# Patient Record
Sex: FEMALE | Race: WHITE | NOT HISPANIC OR LATINO | Employment: OTHER | URBAN - METROPOLITAN AREA
[De-identification: names, ages, dates, MRNs, and addresses within clinical notes are randomized per-mention and may not be internally consistent; named-entity substitution may affect disease eponyms.]

---

## 2018-09-12 ENCOUNTER — TRANSCRIBE ORDERS (OUTPATIENT)
Dept: ADMINISTRATIVE | Facility: HOSPITAL | Age: 83
End: 2018-09-12

## 2018-09-12 ENCOUNTER — HOSPITAL ENCOUNTER (OUTPATIENT)
Dept: RADIOLOGY | Facility: HOSPITAL | Age: 83
Discharge: HOME | End: 2018-09-12
Attending: FAMILY MEDICINE
Payer: MEDICARE

## 2018-09-12 DIAGNOSIS — M54.5 LOW BACK PAIN, UNSPECIFIED BACK PAIN LATERALITY, UNSPECIFIED CHRONICITY, WITH SCIATICA PRESENCE UNSPECIFIED: ICD-10-CM

## 2018-09-12 DIAGNOSIS — M25.551 RIGHT HIP PAIN: ICD-10-CM

## 2018-09-12 DIAGNOSIS — M25.551 RIGHT HIP PAIN: Primary | ICD-10-CM

## 2018-09-12 PROCEDURE — 73521 X-RAY EXAM HIPS BI 2 VIEWS: CPT

## 2018-09-12 PROCEDURE — 72100 X-RAY EXAM L-S SPINE 2/3 VWS: CPT

## 2018-11-30 ENCOUNTER — TRANSCRIBE ORDERS (OUTPATIENT)
Dept: LAB | Facility: HOSPITAL | Age: 83
End: 2018-11-30

## 2018-11-30 ENCOUNTER — HOSPITAL ENCOUNTER (OUTPATIENT)
Dept: RADIOLOGY | Facility: HOSPITAL | Age: 83
Discharge: HOME | End: 2018-11-30
Attending: FAMILY MEDICINE
Payer: MEDICARE

## 2018-11-30 DIAGNOSIS — M54.2 CERVICALGIA: ICD-10-CM

## 2018-11-30 DIAGNOSIS — M54.2 CERVICALGIA: Primary | ICD-10-CM

## 2018-11-30 PROCEDURE — 72040 X-RAY EXAM NECK SPINE 2-3 VW: CPT

## 2021-03-25 ENCOUNTER — HOSPITAL ENCOUNTER (INPATIENT)
Facility: HOSPITAL | Age: 86
LOS: 1 days | Discharge: HOME HEALTH | DRG: 563 | End: 2021-03-27
Attending: EMERGENCY MEDICINE | Admitting: HOSPITALIST
Payer: COMMERCIAL

## 2021-03-25 ENCOUNTER — APPOINTMENT (EMERGENCY)
Dept: RADIOLOGY | Facility: HOSPITAL | Age: 86
DRG: 563 | End: 2021-03-25
Payer: COMMERCIAL

## 2021-03-25 ENCOUNTER — APPOINTMENT (EMERGENCY)
Dept: VASCULAR ULTRASOUND | Facility: HOSPITAL | Age: 86
DRG: 563 | End: 2021-03-25
Payer: COMMERCIAL

## 2021-03-25 DIAGNOSIS — M79.604 BILATERAL LOWER EXTREMITY PAIN: ICD-10-CM

## 2021-03-25 DIAGNOSIS — S82.409A FIBULA FRACTURE: ICD-10-CM

## 2021-03-25 DIAGNOSIS — M79.605 BILATERAL LOWER EXTREMITY PAIN: ICD-10-CM

## 2021-03-25 DIAGNOSIS — R60.0 BILATERAL LOWER EXTREMITY EDEMA: Primary | ICD-10-CM

## 2021-03-25 PROBLEM — H40.9 GLAUCOMA: Status: ACTIVE | Noted: 2021-03-25

## 2021-03-25 PROBLEM — I10 ESSENTIAL HYPERTENSION: Status: ACTIVE | Noted: 2021-03-25

## 2021-03-25 PROBLEM — R26.2 AMBULATORY DYSFUNCTION: Status: ACTIVE | Noted: 2021-03-25

## 2021-03-25 PROBLEM — F41.9 ANXIETY: Status: ACTIVE | Noted: 2021-03-25

## 2021-03-25 PROBLEM — G62.9 POLYNEUROPATHY: Status: ACTIVE | Noted: 2021-03-25

## 2021-03-25 LAB
ALBUMIN SERPL BCP-MCNC: 3.4 G/DL (ref 3.5–5)
ALP SERPL-CCNC: 98 U/L (ref 46–116)
ALT SERPL W P-5'-P-CCNC: 19 U/L (ref 12–78)
ANION GAP SERPL CALCULATED.3IONS-SCNC: 8 MMOL/L (ref 4–13)
APTT PPP: 33 SECONDS (ref 23–37)
AST SERPL W P-5'-P-CCNC: 16 U/L (ref 5–45)
BASOPHILS # BLD AUTO: 0.03 THOUSANDS/ΜL (ref 0–0.1)
BASOPHILS NFR BLD AUTO: 1 % (ref 0–1)
BILIRUB SERPL-MCNC: 0.33 MG/DL (ref 0.2–1)
BUN SERPL-MCNC: 23 MG/DL (ref 5–25)
CALCIUM ALBUM COR SERPL-MCNC: 9.2 MG/DL (ref 8.3–10.1)
CALCIUM SERPL-MCNC: 8.7 MG/DL (ref 8.3–10.1)
CHLORIDE SERPL-SCNC: 105 MMOL/L (ref 100–108)
CO2 SERPL-SCNC: 28 MMOL/L (ref 21–32)
CREAT SERPL-MCNC: 0.76 MG/DL (ref 0.6–1.3)
EOSINOPHIL # BLD AUTO: 0.23 THOUSAND/ΜL (ref 0–0.61)
EOSINOPHIL NFR BLD AUTO: 4 % (ref 0–6)
ERYTHROCYTE [DISTWIDTH] IN BLOOD BY AUTOMATED COUNT: 13.3 % (ref 11.6–15.1)
GFR SERPL CREATININE-BSD FRML MDRD: 69 ML/MIN/1.73SQ M
GLUCOSE SERPL-MCNC: 101 MG/DL (ref 65–140)
HCT VFR BLD AUTO: 37.5 % (ref 34.8–46.1)
HGB BLD-MCNC: 12.3 G/DL (ref 11.5–15.4)
IMM GRANULOCYTES # BLD AUTO: 0.02 THOUSAND/UL (ref 0–0.2)
IMM GRANULOCYTES NFR BLD AUTO: 0 % (ref 0–2)
INR PPP: 1 (ref 0.84–1.19)
LYMPHOCYTES # BLD AUTO: 1.22 THOUSANDS/ΜL (ref 0.6–4.47)
LYMPHOCYTES NFR BLD AUTO: 19 % (ref 14–44)
MCH RBC QN AUTO: 32.1 PG (ref 26.8–34.3)
MCHC RBC AUTO-ENTMCNC: 32.8 G/DL (ref 31.4–37.4)
MCV RBC AUTO: 98 FL (ref 82–98)
MONOCYTES # BLD AUTO: 0.65 THOUSAND/ΜL (ref 0.17–1.22)
MONOCYTES NFR BLD AUTO: 10 % (ref 4–12)
NEUTROPHILS # BLD AUTO: 4.22 THOUSANDS/ΜL (ref 1.85–7.62)
NEUTS SEG NFR BLD AUTO: 66 % (ref 43–75)
NRBC BLD AUTO-RTO: 0 /100 WBCS
NT-PROBNP SERPL-MCNC: 510 PG/ML
PLATELET # BLD AUTO: 258 THOUSANDS/UL (ref 149–390)
PMV BLD AUTO: 8.8 FL (ref 8.9–12.7)
POTASSIUM SERPL-SCNC: 4.4 MMOL/L (ref 3.5–5.3)
PROT SERPL-MCNC: 6.8 G/DL (ref 6.4–8.2)
PROTHROMBIN TIME: 13.3 SECONDS (ref 11.6–14.5)
RBC # BLD AUTO: 3.83 MILLION/UL (ref 3.81–5.12)
SODIUM SERPL-SCNC: 141 MMOL/L (ref 136–145)
TROPONIN I SERPL-MCNC: <0.02 NG/ML
WBC # BLD AUTO: 6.37 THOUSAND/UL (ref 4.31–10.16)

## 2021-03-25 PROCEDURE — 99285 EMERGENCY DEPT VISIT HI MDM: CPT

## 2021-03-25 PROCEDURE — 85610 PROTHROMBIN TIME: CPT | Performed by: PHYSICIAN ASSISTANT

## 2021-03-25 PROCEDURE — 71045 X-RAY EXAM CHEST 1 VIEW: CPT

## 2021-03-25 PROCEDURE — 80053 COMPREHEN METABOLIC PANEL: CPT | Performed by: EMERGENCY MEDICINE

## 2021-03-25 PROCEDURE — 84484 ASSAY OF TROPONIN QUANT: CPT | Performed by: EMERGENCY MEDICINE

## 2021-03-25 PROCEDURE — 93005 ELECTROCARDIOGRAM TRACING: CPT

## 2021-03-25 PROCEDURE — 83880 ASSAY OF NATRIURETIC PEPTIDE: CPT | Performed by: PHYSICIAN ASSISTANT

## 2021-03-25 PROCEDURE — 36415 COLL VENOUS BLD VENIPUNCTURE: CPT

## 2021-03-25 PROCEDURE — 93970 EXTREMITY STUDY: CPT

## 2021-03-25 PROCEDURE — 85730 THROMBOPLASTIN TIME PARTIAL: CPT | Performed by: PHYSICIAN ASSISTANT

## 2021-03-25 PROCEDURE — 99285 EMERGENCY DEPT VISIT HI MDM: CPT | Performed by: PHYSICIAN ASSISTANT

## 2021-03-25 PROCEDURE — 85025 COMPLETE CBC W/AUTO DIFF WBC: CPT | Performed by: EMERGENCY MEDICINE

## 2021-03-25 PROCEDURE — 93970 EXTREMITY STUDY: CPT | Performed by: RADIOLOGY

## 2021-03-25 PROCEDURE — 99220 PR INITIAL OBSERVATION CARE/DAY 70 MINUTES: CPT | Performed by: INTERNAL MEDICINE

## 2021-03-25 RX ORDER — MELOXICAM 15 MG/1
15 TABLET ORAL DAILY
COMMUNITY

## 2021-03-25 RX ORDER — GABAPENTIN 300 MG/1
300 CAPSULE ORAL 3 TIMES DAILY
COMMUNITY

## 2021-03-25 RX ORDER — OXYCODONE HYDROCHLORIDE 5 MG/1
2.5 TABLET ORAL EVERY 4 HOURS PRN
Status: DISCONTINUED | OUTPATIENT
Start: 2021-03-25 | End: 2021-03-27 | Stop reason: HOSPADM

## 2021-03-25 RX ORDER — CALCIUM CARBONATE 500(1250)
500 TABLET ORAL DAILY
Status: DISCONTINUED | OUTPATIENT
Start: 2021-03-26 | End: 2021-03-27 | Stop reason: HOSPADM

## 2021-03-25 RX ORDER — SERTRALINE HYDROCHLORIDE 25 MG/1
25 TABLET, FILM COATED ORAL DAILY
Status: DISCONTINUED | OUTPATIENT
Start: 2021-03-26 | End: 2021-03-27 | Stop reason: HOSPADM

## 2021-03-25 RX ORDER — LISINOPRIL 10 MG/1
10 TABLET ORAL DAILY
Status: DISCONTINUED | OUTPATIENT
Start: 2021-03-26 | End: 2021-03-27 | Stop reason: HOSPADM

## 2021-03-25 RX ORDER — LEVOTHYROXINE SODIUM 0.03 MG/1
25 TABLET ORAL
Status: DISCONTINUED | OUTPATIENT
Start: 2021-03-26 | End: 2021-03-27 | Stop reason: HOSPADM

## 2021-03-25 RX ORDER — LANOLIN ALCOHOL/MO/W.PET/CERES
10 CREAM (GRAM) TOPICAL
Status: DISCONTINUED | OUTPATIENT
Start: 2021-03-25 | End: 2021-03-27 | Stop reason: HOSPADM

## 2021-03-25 RX ORDER — LATANOPROST 50 UG/ML
1 SOLUTION/ DROPS OPHTHALMIC
Status: DISCONTINUED | OUTPATIENT
Start: 2021-03-25 | End: 2021-03-27 | Stop reason: HOSPADM

## 2021-03-25 RX ORDER — PHENOL 1.4 %
10 AEROSOL, SPRAY (ML) MUCOUS MEMBRANE
COMMUNITY
End: 2022-01-25 | Stop reason: HOSPADM

## 2021-03-25 RX ORDER — ACETAMINOPHEN 325 MG/1
975 TABLET ORAL EVERY 8 HOURS SCHEDULED
Status: DISCONTINUED | OUTPATIENT
Start: 2021-03-25 | End: 2021-03-27 | Stop reason: HOSPADM

## 2021-03-25 RX ORDER — ONDANSETRON 2 MG/ML
4 INJECTION INTRAMUSCULAR; INTRAVENOUS EVERY 4 HOURS PRN
Status: DISCONTINUED | OUTPATIENT
Start: 2021-03-25 | End: 2021-03-27 | Stop reason: HOSPADM

## 2021-03-25 RX ORDER — AMOXICILLIN 250 MG
1 CAPSULE ORAL 2 TIMES DAILY
Status: DISCONTINUED | OUTPATIENT
Start: 2021-03-25 | End: 2021-03-27 | Stop reason: HOSPADM

## 2021-03-25 RX ORDER — ALPRAZOLAM 0.25 MG/1
0.25 TABLET ORAL 2 TIMES DAILY PRN
Status: DISCONTINUED | OUTPATIENT
Start: 2021-03-25 | End: 2021-03-27 | Stop reason: HOSPADM

## 2021-03-25 RX ORDER — LISINOPRIL 10 MG/1
10 TABLET ORAL DAILY
COMMUNITY
End: 2022-02-09 | Stop reason: DRUGHIGH

## 2021-03-25 RX ORDER — PROPRANOLOL HYDROCHLORIDE 20 MG/1
10 TABLET ORAL 3 TIMES DAILY
Status: DISCONTINUED | OUTPATIENT
Start: 2021-03-25 | End: 2021-03-27 | Stop reason: HOSPADM

## 2021-03-25 RX ORDER — CYCLOSPORINE 0.5 MG/ML
1 EMULSION OPHTHALMIC 2 TIMES DAILY
Status: DISCONTINUED | OUTPATIENT
Start: 2021-03-25 | End: 2021-03-27 | Stop reason: HOSPADM

## 2021-03-25 RX ORDER — HYDROMORPHONE HCL/PF 1 MG/ML
0.2 SYRINGE (ML) INJECTION EVERY 4 HOURS PRN
Status: DISCONTINUED | OUTPATIENT
Start: 2021-03-25 | End: 2021-03-27 | Stop reason: HOSPADM

## 2021-03-25 RX ORDER — OMEPRAZOLE 20 MG/1
20 CAPSULE, DELAYED RELEASE ORAL DAILY
COMMUNITY

## 2021-03-25 RX ORDER — DORZOLAMIDE HYDROCHLORIDE AND TIMOLOL MALEATE 20; 5 MG/ML; MG/ML
1 SOLUTION/ DROPS OPHTHALMIC 2 TIMES DAILY
Status: DISCONTINUED | OUTPATIENT
Start: 2021-03-25 | End: 2021-03-27 | Stop reason: HOSPADM

## 2021-03-25 RX ORDER — SERTRALINE HYDROCHLORIDE 25 MG/1
25 TABLET, FILM COATED ORAL DAILY
COMMUNITY

## 2021-03-25 RX ORDER — LORATADINE 10 MG/1
10 TABLET ORAL DAILY
COMMUNITY
End: 2022-01-25 | Stop reason: HOSPADM

## 2021-03-25 RX ORDER — ACETAMINOPHEN 325 MG/1
650 TABLET ORAL EVERY 6 HOURS PRN
Status: ON HOLD | COMMUNITY
End: 2021-03-27 | Stop reason: SDUPTHER

## 2021-03-25 RX ORDER — OXYCODONE HYDROCHLORIDE 5 MG/1
5 TABLET ORAL EVERY 4 HOURS PRN
Status: DISCONTINUED | OUTPATIENT
Start: 2021-03-25 | End: 2021-03-27 | Stop reason: HOSPADM

## 2021-03-25 RX ORDER — GABAPENTIN 300 MG/1
300 CAPSULE ORAL 3 TIMES DAILY
Status: DISCONTINUED | OUTPATIENT
Start: 2021-03-25 | End: 2021-03-27 | Stop reason: HOSPADM

## 2021-03-25 RX ORDER — PANTOPRAZOLE SODIUM 40 MG/1
40 TABLET, DELAYED RELEASE ORAL
Status: DISCONTINUED | OUTPATIENT
Start: 2021-03-26 | End: 2021-03-27 | Stop reason: HOSPADM

## 2021-03-25 RX ORDER — PROPRANOLOL HYDROCHLORIDE 10 MG/1
10 TABLET ORAL 3 TIMES DAILY
COMMUNITY

## 2021-03-25 RX ORDER — LEVOTHYROXINE SODIUM 0.03 MG/1
25 TABLET ORAL DAILY
COMMUNITY

## 2021-03-25 RX ORDER — CYCLOSPORINE 0.5 MG/ML
1 EMULSION OPHTHALMIC 2 TIMES DAILY
COMMUNITY

## 2021-03-25 RX ORDER — ALPRAZOLAM 0.25 MG/1
TABLET ORAL
COMMUNITY
End: 2022-03-17

## 2021-03-25 RX ORDER — LORATADINE 10 MG/1
10 TABLET ORAL DAILY
Status: DISCONTINUED | OUTPATIENT
Start: 2021-03-26 | End: 2021-03-27 | Stop reason: HOSPADM

## 2021-03-25 RX ADMIN — MELATONIN TAB 3 MG 10.5 MG: 3 TAB at 21:06

## 2021-03-25 RX ADMIN — ACETAMINOPHEN 975 MG: 325 TABLET, FILM COATED ORAL at 21:06

## 2021-03-25 RX ADMIN — GABAPENTIN 300 MG: 300 CAPSULE ORAL at 21:06

## 2021-03-25 RX ADMIN — LATANOPROST 1 DROP: 50 SOLUTION OPHTHALMIC at 21:07

## 2021-03-25 RX ADMIN — PROPRANOLOL HYDROCHLORIDE 10 MG: 20 TABLET ORAL at 21:05

## 2021-03-25 RX ADMIN — DICLOFENAC SODIUM 2 G: 10 GEL TOPICAL at 21:06

## 2021-03-25 NOTE — ASSESSMENT & PLAN NOTE
· POA, secondary to bilateral lower extremity pain  · Patient with progressively worsening ambulation and difficulty with ADLs  · PT/OT evaluation

## 2021-03-25 NOTE — ASSESSMENT & PLAN NOTE
· Blood pressure elevated to the 535X systolic in the ER, likely related to pain  · Continue home dose lisinopril, propranolol, control pain

## 2021-03-25 NOTE — ED PROVIDER NOTES
History  Chief Complaint   Patient presents with    Leg Swelling     Pt presents to the ED with c/o right leg swelling that has become ingreasingly worse over the past couple of days  Pt reports no injury to the leg, reports difficulty ambulating  Cordell Boo is a 80 y o  female with a PMHx of HTN who presents to the ED with complaints of bilateral leg pain and swelling (right > left) over the past 2 weeks  Patient states she was seen by PCP and placed on a "few days of water pills" without improvement of symptoms  Patient believes that she will affect the medication for approximately 1 week ago  Patient states she had a chest x-ray at this time due to a chronic cough  Patient lives in independent living and admits to decreased mobility and exercise  Patient states pain is worsened with movement  Patient states pain was previously below the knee but is now radiating into the anterior thigh  Patient states she had a previous echocardiogram but she is unsure of last evaluation  Patient states she has been told her heart is "great "       History provided by:  Patient  Leg Pain  Location:  Leg  Time since incident:  2 weeks  Leg location:  R lower leg and L lower leg  Pain details:     Quality:  Throbbing    Duration:  2 weeks  Associated symptoms: decreased ROM and swelling    Associated symptoms: no back pain, no fatigue, no fever, no itching, no muscle weakness, no neck pain, no numbness, no stiffness and no tingling        Prior to Admission Medications   Prescriptions Last Dose Informant Patient Reported? Taking?    ALPRAZolam (XANAX) 0 25 mg tablet 3/25/2021 at Unknown time  Yes Yes   Sig: Take by mouth daily at bedtime as needed for anxiety   Brimonidine Tartrate-Timolol (COMBIGAN OP)   Yes No   Sig: Apply to eye   Calcium 250 MG CAPS 3/25/2021 at Unknown time  Yes Yes   Sig: Take 500 mg by mouth   Diclofenac Sodium (Voltaren) 1 %   Yes Yes   Sig: Apply 2 g topically 4 (four) times a day   Melatonin 10 MG TABS   Yes Yes   Sig: Take 10 mg by mouth   Tafluprost (ZIOPTAN OP)   Yes Yes   Sig: Apply to eye   acetaminophen (TYLENOL) 325 mg tablet 3/25/2021 at Unknown time  Yes Yes   Sig: Take 650 mg by mouth every 6 (six) hours as needed for mild pain   cycloSPORINE (RESTASIS) 0 05 % ophthalmic emulsion   Yes Yes   Si drop 2 (two) times a day   gabapentin (NEURONTIN) 300 mg capsule 3/25/2021 at Unknown time  Yes Yes   Sig: Take 300 mg by mouth 3 (three) times a day   levothyroxine 25 mcg tablet 3/25/2021 at Unknown time  Yes Yes   Sig: Take 25 mcg by mouth daily   lisinopril (ZESTRIL) 10 mg tablet 3/25/2021 at Unknown time  Yes Yes   Sig: Take 10 mg by mouth daily   loratadine (CLARITIN) 10 mg tablet 3/25/2021 at Unknown time  Yes Yes   Sig: Take 10 mg by mouth daily   meloxicam (MOBIC) 15 mg tablet 3/25/2021 at Unknown time  Yes Yes   Sig: Take 15 mg by mouth daily   omeprazole (PriLOSEC) 20 mg delayed release capsule 3/25/2021 at Unknown time  Yes Yes   Sig: Take 20 mg by mouth daily   propranolol (INDERAL) 10 mg tablet 3/25/2021 at Unknown time  Yes Yes   Sig: Take 10 mg by mouth 3 (three) times a day   sertraline (ZOLOFT) 25 mg tablet 3/25/2021 at Unknown time  Yes Yes   Sig: Take 25 mg by mouth daily      Facility-Administered Medications: None       Past Medical History:   Diagnosis Date    Arthritis     Hypertension        No past surgical history on file  No family history on file  I have reviewed and agree with the history as documented  E-Cigarette/Vaping     E-Cigarette/Vaping Substances     Social History     Tobacco Use    Smoking status: Never Smoker    Smokeless tobacco: Never Used   Substance Use Topics    Alcohol use: Not on file    Drug use: Not on file       Review of Systems   Constitutional: Negative for appetite change, chills, fatigue, fever and unexpected weight change     HENT: Negative for congestion, drooling, ear pain, rhinorrhea, sore throat, trouble swallowing and voice change  Eyes: Negative for pain, discharge, redness and visual disturbance  Respiratory: Negative for cough (Chronic), shortness of breath, wheezing and stridor  Cardiovascular: Positive for leg swelling  Negative for chest pain and palpitations  Gastrointestinal: Negative for abdominal pain, blood in stool, constipation, diarrhea, nausea and vomiting  Genitourinary: Negative for dysuria, flank pain, frequency, hematuria and urgency  Musculoskeletal: Positive for gait problem  Negative for back pain, joint swelling, neck pain, neck stiffness and stiffness  Skin: Negative for color change, itching and rash  Neurological: Negative for dizziness, seizures, light-headedness and headaches  Physical Exam  Physical Exam  Vitals signs and nursing note reviewed  Constitutional:       General: She is not in acute distress  Appearance: She is well-developed  She is obese  She is not ill-appearing  HENT:      Head: Normocephalic and atraumatic  Nose: Nose normal    Eyes:      Conjunctiva/sclera: Conjunctivae normal       Pupils: Pupils are equal, round, and reactive to light  Cardiovascular:      Rate and Rhythm: Normal rate and regular rhythm  Pulmonary:      Effort: Pulmonary effort is normal       Breath sounds: Examination of the right-lower field reveals decreased breath sounds  Decreased breath sounds present  No wheezing  Musculoskeletal: Normal range of motion  Right knee: She exhibits normal range of motion  Tenderness found  Left knee: She exhibits normal range of motion  Tenderness found  Right ankle: She exhibits normal range of motion  No tenderness  Left ankle: She exhibits normal range of motion  No tenderness  Comments: 1+ pitting edema of the bilateral lower extremities (right greater than left)  Tenderness on palpation of the anterior posterior aspect of the lower legs  Skin:     General: Skin is warm and dry        Capillary Refill: Capillary refill takes less than 2 seconds  Neurological:      Mental Status: She is alert and oriented to person, place, and time           Vital Signs  ED Triage Vitals   Temperature Pulse Respirations Blood Pressure SpO2   03/25/21 1220 03/25/21 1218 03/25/21 1218 03/25/21 1218 03/25/21 1218   98 6 °F (37 °C) 78 16 (!) 191/86 96 %      Temp Source Heart Rate Source Patient Position - Orthostatic VS BP Location FiO2 (%)   03/25/21 1218 03/25/21 1218 03/25/21 1347 03/25/21 1218 --   Oral Monitor Lying Left arm       Pain Score       03/25/21 1218       7           Vitals:    03/25/21 1218 03/25/21 1347 03/25/21 1445 03/25/21 1657   BP: (!) 191/86 (!) 174/76 169/73 (!) 187/79   Pulse: 78 71 72 72   Patient Position - Orthostatic VS:  Lying Sitting          Visual Acuity      ED Medications  Medications - No data to display    Diagnostic Studies  Results Reviewed     Procedure Component Value Units Date/Time    NT-BNP PRO [[id number]]  (Abnormal) Collected: 03/25/21 1221    Lab Status: Final result Specimen: Blood from Arm, Right Updated: 03/25/21 1514     NT-proBNP 510 pg/mL     Protime-INR [[id number]]  (Normal) Collected: 03/25/21 1402    Lab Status: Final result Specimen: Blood Updated: 03/25/21 1410     Protime 13 3 seconds      INR 1 00    APTT [[id number]]  (Normal) Collected: 03/25/21 1402    Lab Status: Final result Specimen: Blood Updated: 03/25/21 1410     PTT 33 seconds     Troponin I [[id number]]  (Normal) Collected: 03/25/21 1221    Lab Status: Final result Specimen: Blood from Arm, Right Updated: 03/25/21 1254     Troponin I <0 02 ng/mL     Comprehensive metabolic panel [[id number]]  (Abnormal) Collected: 03/25/21 1221    Lab Status: Final result Specimen: Blood from Arm, Right Updated: 03/25/21 1252     Sodium 141 mmol/L      Potassium 4 4 mmol/L      Chloride 105 mmol/L      CO2 28 mmol/L      ANION GAP 8 mmol/L      BUN 23 mg/dL      Creatinine 0 76 mg/dL      Glucose 101 mg/dL      Calcium 8 7 mg/dL Corrected Calcium 9 2 mg/dL      AST 16 U/L      ALT 19 U/L      Alkaline Phosphatase 98 U/L      Total Protein 6 8 g/dL      Albumin 3 4 g/dL      Total Bilirubin 0 33 mg/dL      eGFR 69 ml/min/1 73sq m     Narrative:      [place] guidelines for Chronic Kidney Disease (CKD):     Stage 1 with normal or high GFR (GFR > 90 mL/min/1 73 square meters)    Stage 2 Mild CKD (GFR = 60-89 mL/min/1 73 square meters)    Stage 3A Moderate CKD (GFR = 45-59 mL/min/1 73 square meters)    Stage 3B Moderate CKD (GFR = 30-44 mL/min/1 73 square meters)    Stage 4 Severe CKD (GFR = 15-29 mL/min/1 73 square meters)    Stage 5 End Stage CKD (GFR <15 mL/min/1 73 square meters)  Note: GFR calculation is accurate only with a steady state creatinine    CBC and differential [[id number]]  (Abnormal) Collected: 03/25/21 1221    Lab Status: Final result Specimen: Blood from Arm, Right Updated: 03/25/21 1239     WBC 6 37 Thousand/uL      RBC 3 83 Million/uL      Hemoglobin 12 3 g/dL      Hematocrit 37 5 %      MCV 98 fL      MCH 32 1 pg      MCHC 32 8 g/dL      RDW 13 3 %      MPV 8 8 fL      Platelets 390 Thousands/uL      nRBC 0 /100 WBCs      Neutrophils Relative 66 %      Immat GRANS % 0 %      Lymphocytes Relative 19 %      Monocytes Relative 10 %      Eosinophils Relative 4 %      Basophils Relative 1 %      Neutrophils Absolute 4 22 Thousands/µL      Immature Grans Absolute 0 02 Thousand/uL      Lymphocytes Absolute 1 22 Thousands/µL      Monocytes Absolute 0 65 Thousand/µL      Eosinophils Absolute 0 23 Thousand/µL      Basophils Absolute 0 03 Thousands/µL                  XR chest 1 view portable   Final Result by Neha Trinh MD (03/25 6564)      Minimal atelectasis or scarring                    Workstation performed: [id number]         VAS lower limb venous duplex study, complete bilateral    (Results Pending)              Procedures  ECG 12 Lead Documentation Only    Date/Time: 3/25/2021 1:47 PM  Performed by: Melanie May PA-C  Authorized by: Lesley Ramey DO     Indications / Diagnosis:  Leg Swelling  Patient location:  ED  Previous ECG:     Previous ECG:  Unavailable  Rate:     ECG rate:  72    ECG rate assessment: normal    Rhythm:     Rhythm: sinus rhythm    QRS:     QRS axis:  Normal  ST segments:     ST segments:  Normal  T waves:     T waves: normal    Comments:      QT/QTc 388/425             ED Course  ED Course as of Mar 25 1800   Thu Mar 25, 2021   1443 Informed by Margaret that US is negative for DVT  200 I had a lengthy discussion with the patient and her granddaughter at bedside  Patient does not feel comfortable going home at this time given worsening leg pain and swelling  Possible concern for fluid retention  Patient was on Lasix without improvement of symptoms  MDM  Number of Diagnoses or Management Options  Bilateral lower extremity edema: new and requires workup  Bilateral lower extremity pain: new and requires workup  Diagnosis management comments: Patient and family are concerned about patient's significant leg swelling, fluid retention and inability to ambulate at home  Discussed with SLALMA  We had a detailed discussion of the patient's condition and case, including need for admission   Accepts to his/her service   Bed request/bridging orders placed          Amount and/or Complexity of Data Reviewed  Clinical lab tests: reviewed and ordered  Tests in the radiology section of CPT®: ordered and reviewed  Review and summarize past medical records: yes  Discuss the patient with other providers: yes (SLIM)    Patient Progress  Patient progress: stable      Disposition  Final diagnoses:   Bilateral lower extremity edema   Bilateral lower extremity pain     Time reflects when diagnosis was documented in both MDM as applicable and the Disposition within this note     Time User Action Codes Description Comment    3/25/2021 3:34 PM Ora Main Add [R60 0] Bilateral lower extremity edema     3/25/2021  3:35 PM Ora Main Add [K81 445,  M63 605] Bilateral lower extremity pain       ED Disposition     ED Disposition Condition Date/Time Comment    Admit Stable Thu Mar 25, 2021  3:35 PM Case was discussed with MARTHA and the patient's admission status was agreed to be Admission Status: observation status to the service of Dr Lindsey Dumont  Follow-up Information    None         Current Discharge Medication List      CONTINUE these medications which have NOT CHANGED    Details   acetaminophen (TYLENOL) 325 mg tablet Take 650 mg by mouth every 6 (six) hours as needed for mild pain      ALPRAZolam (XANAX) 0 25 mg tablet Take by mouth daily at bedtime as needed for anxiety      Calcium 250 MG CAPS Take 500 mg by mouth      cycloSPORINE (RESTASIS) 0 05 % ophthalmic emulsion 1 drop 2 (two) times a day      Diclofenac Sodium (Voltaren) 1 % Apply 2 g topically 4 (four) times a day      gabapentin (NEURONTIN) 300 mg capsule Take 300 mg by mouth 3 (three) times a day      levothyroxine 25 mcg tablet Take 25 mcg by mouth daily      lisinopril (ZESTRIL) 10 mg tablet Take 10 mg by mouth daily      loratadine (CLARITIN) 10 mg tablet Take 10 mg by mouth daily      Melatonin 10 MG TABS Take 10 mg by mouth      meloxicam (MOBIC) 15 mg tablet Take 15 mg by mouth daily      omeprazole (PriLOSEC) 20 mg delayed release capsule Take 20 mg by mouth daily      propranolol (INDERAL) 10 mg tablet Take 10 mg by mouth 3 (three) times a day      sertraline (ZOLOFT) 25 mg tablet Take 25 mg by mouth daily      Tafluprost (ZIOPTAN OP) Apply to eye      Brimonidine Tartrate-Timolol (COMBIGAN OP) Apply to eye           No discharge procedures on file      PDMP Review     None          ED Provider  Electronically Signed by           Jacques Gonzalez PA-C  03/25/21 0809

## 2021-03-25 NOTE — ASSESSMENT & PLAN NOTE
· POA, presents with 2 weeks history progressively worsening bilateral lower extremity pain and swelling initially below the knees and now radiating up to the thighs  · Was placed on a 3 day course of diuretics which patient reported made no difference  · Exam in the ED revealed R>L +1 ankle edema and tenderness posterior knee without joint swelling or tenderness  · Patient does have history of primary osteoarthritis and chronic pain of bilateral knees with effusion as noted on rheumatology office visit notes in December of 2019 at [facility]  · Also has history of polyneuropathy and is on gabapentin 300 mg t i d   · Bilateral lower extremity venous duplex studies obtained in the ED without evidence for DVT or superficial thrombophlebitis  · Suspect symptoms likely musculoskeletal   Will check x-ray of bilateral knees  · Scheduled Tylenol for pain control, Voltaren gel, hold Mobic due to extremity edema  · Compression stockings for extremity swelling  ProBNP mildly elevated, will check echocardiogram   Hold off on further diuretics  · PT/OT evaluation    Patient currently resides at [facility]

## 2021-03-25 NOTE — H&P
[address] [place] [address] 8/25/1930, 80 y o  female MRN: [id number]  Unit/Bed#: ED 26 Encounter: [id number]  Primary Care Provider: Arcadio Manning MD   Date and time admitted to hospital: 3/25/2021 12:45 PM    * Bilateral lower extremity pain  Assessment & Plan  · POA, presents with 2 weeks history progressively worsening bilateral lower extremity pain and swelling initially below the knees and now radiating up to the thighs  · Was placed on a 3 day course of diuretics which patient reported made no difference  · Exam in the ED revealed R>L +1 ankle edema and tenderness posterior knee without joint swelling or tenderness  · Patient does have history of primary osteoarthritis and chronic pain of bilateral knees with effusion as noted on rheumatology office visit notes in December of 2019 at [facility]  · Also has history of polyneuropathy and is on gabapentin 300 mg t i d   · Bilateral lower extremity venous duplex studies obtained in the ED without evidence for DVT or superficial thrombophlebitis  · Suspect symptoms likely musculoskeletal   Will check x-ray of bilateral knees  · Scheduled Tylenol for pain control, Voltaren gel, hold Mobic due to extremity edema  · Compression stockings for extremity swelling  ProBNP mildly elevated, will check echocardiogram   Hold off on further diuretics  · PT/OT evaluation    Patient currently resides at independent living    Ambulatory dysfunction  Assessment & Plan  · POA, secondary to bilateral lower extremity pain  · Patient with progressively worsening ambulation and difficulty with ADLs  · PT/OT evaluation    Essential hypertension  Assessment & Plan  · Blood pressure elevated to the 933S systolic in the ER, likely related to pain  · Continue home dose lisinopril, propranolol, control pain    Polyneuropathy  Assessment & Plan  · Follows with Rheumatology at [facility]  · Continue gabapentin    Glaucoma  Assessment & Plan  · Continue eyedrops    Anxiety  Assessment & Plan  · Continue Xanax at home dose      VTE Prophylaxis: Enoxaparin (Lovenox)  / sequential compression device     Code Status: DNR/DNI    POLST: POLST form is not discussed and not completed at this time  Patient and granddaughter updated at the bedside, all questions answered    Anticipated Length of Stay:  Patient will be admitted on an Observation basis with an anticipated length of stay of  < 2 midnights  Justification for Hospital Stay:  Bilateral lower extremity pain    Chief Complaint:     Bilateral lower extremity pain and swelling    History of Present Illness:  Anton Zayas is a 80 y o  female who presents with 2 weeks history progressively worsening bilateral lower extremity pain and swelling  The patient has a past medical history significant for primary osteoarthritis, polyneuropathy, chronic bilateral knee pain and presented to the ER due to worsening symptoms with difficulty ambulating performing ADLs  She was recently treated with 3 day course of diuretics which patient reports did not make a difference with her symptoms  She denied any chest pain, no shortness of breath, no orthopnea or PND  Denies trauma  No prior cardiac history  CBC and CMP in the ED was mostly within normal limits  ProBNP was mildly elevated at 510  History was obtained with from the patient and granddaughter at the bedside  Review of Systems   Constitutional: Positive for activity change  Negative for appetite change, chills and fever  HENT: Negative  Eyes: Negative  Respiratory: Negative  Negative for cough, chest tightness and shortness of breath  Cardiovascular: Positive for leg swelling  Negative for chest pain and palpitations  Gastrointestinal: Negative  Genitourinary: Negative  Musculoskeletal: Positive for arthralgias and gait problem  Neurological: Positive for light-headedness  Negative for dizziness     Psychiatric/Behavioral: The patient is nervous/anxious  All other systems reviewed and are negative  Past Medical History:   Diagnosis Date    Arthritis     Hypertension        No past surgical history on file  Family History:  non-contributory    Home Meds:  all medications and allergies reviewed    Allergies: Allergies   Allergen Reactions    Iodinated Diagnostic Agents Hives    Nsaids GI Intolerance    Shellfish-Derived Products Hives         Marital Status:      Social History     Substance and Sexual Activity   Alcohol Use Not on file     Social History     Tobacco Use   Smoking Status Never Smoker   Smokeless Tobacco Never Used     Social History     Substance and Sexual Activity   Drug Use Not on file           Physical Exam:   Vitals:   Blood Pressure: (!) 187/79 (03/25/21 1657)  Pulse: 72 (03/25/21 1657)  Temperature: 98 2 °F (36 8 °C) (03/25/21 1347)  Temp Source: Oral (03/25/21 1347)  Respirations: 16 (03/25/21 1657)  SpO2: 94 % (03/25/21 1657)    Physical Exam  Vitals signs reviewed  Constitutional:       General: She is not in acute distress  Appearance: She is not ill-appearing, toxic-appearing or diaphoretic  HENT:      Head: Atraumatic  Mouth/Throat:      Mouth: Mucous membranes are moist    Eyes:      General: No scleral icterus  Right eye: No discharge  Left eye: No discharge  Neck:      Musculoskeletal: Neck supple  Cardiovascular:      Rate and Rhythm: Normal rate and regular rhythm  Pulmonary:      Effort: Pulmonary effort is normal  No respiratory distress  Breath sounds: Normal breath sounds  No wheezing, rhonchi or rales  Abdominal:      General: Bowel sounds are normal       Palpations: Abdomen is soft  There is no mass  Tenderness: There is no abdominal tenderness  There is no guarding  Musculoskeletal:         General: Tenderness present  No swelling or signs of injury  Right lower leg: Edema present  Left lower leg: Edema present  Neurological:      General: No focal deficit present  Mental Status: She is alert and oriented to person, place, and time  Mental status is at baseline  Psychiatric:         Mood and Affect: Mood normal          Behavior: Behavior normal          Lab Results: I have personally reviewed pertinent reports  Results from last 7 days   Lab Units 03/25/21  1221   WBC Thousand/uL 6 37   HEMOGLOBIN g/dL 12 3   HEMATOCRIT % 37 5   PLATELETS Thousands/uL 258   NEUTROS PCT % 66   LYMPHS PCT % 19   MONOS PCT % 10   EOS PCT % 4     Results from last 7 days   Lab Units 03/25/21  1221   POTASSIUM mmol/L 4 4   CHLORIDE mmol/L 105   CO2 mmol/L 28   BUN mg/dL 23   CREATININE mg/dL 0 76   CALCIUM mg/dL 8 7   ALK PHOS U/L 98   ALT U/L 19   AST U/L 16     Results from last 7 days   Lab Units 03/25/21  1402   INR  1 00       Imaging: I have personally reviewed pertinent reports  Xr Chest 1 View Portable    Result Date: 3/25/2021  Narrative: CHEST INDICATION:   Leg Swelling  COMPARISON:  1/31/2008 EXAM PERFORMED/VIEWS:  XR CHEST PORTABLE FINDINGS: Cardiomediastinal silhouette appears unremarkable  Aortic atherosclerotic calcifications  Minimal basilar linear atelectasis or scarring  Lungs are otherwise clear  No pneumothorax or pleural effusion  Degenerative changes in the spine  Severe bilateral glenohumeral osteoarthritis  Impression: Minimal atelectasis or scarring  Workstation performed: [id number]     Vas Lower Limb Venous Duplex Study, Complete Bilateral    Result Date: 3/25/2021  Narrative:  [facility] REPORT [facility]INICAL: Indications: Patient presents with bilateral lower extremity edema and pain around the knee (right > left)  Operative History: Patient denies any cardiovascular procedures Risk Factors The patient has history of HTN  CONCLUSION: RIGHT LOWER LIMB: No evidence of acute or chronic deep vein thrombosis  No evidence of superficial thrombophlebitis noted   Doppler evaluation shows a normal response to augmentation maneuvers  Popliteal, posterior tibial and anterior tibial arterial Doppler waveforms are triphasic  LEFT LOWER LIMB: No evidence of acute or chronic deep vein thrombosis  No evidence of superficial thrombophlebitis noted  Doppler evaluation shows a normal response to augmentation maneuvers  Popliteal, posterior tibial and anterior tibial arterial Doppler waveforms are triphasic  Technical findings were given to Dr Kevin Chavez via tiger text  ** Please Note: Dragon 360 Dictation voice to text software may have been used in the creation of this document   **

## 2021-03-26 ENCOUNTER — APPOINTMENT (OUTPATIENT)
Dept: RADIOLOGY | Facility: HOSPITAL | Age: 86
DRG: 563 | End: 2021-03-26
Payer: COMMERCIAL

## 2021-03-26 ENCOUNTER — APPOINTMENT (OUTPATIENT)
Dept: NON INVASIVE DIAGNOSTICS | Facility: HOSPITAL | Age: 86
DRG: 563 | End: 2021-03-26
Payer: COMMERCIAL

## 2021-03-26 PROBLEM — S82.401A: Status: ACTIVE | Noted: 2021-03-26

## 2021-03-26 LAB
ATRIAL RATE: 76 BPM
P AXIS: 65 DEGREES
PR INTERVAL: 150 MS
QRS AXIS: 33 DEGREES
QRSD INTERVAL: 80 MS
QT INTERVAL: 388 MS
QTC INTERVAL: 425 MS
T WAVE AXIS: 60 DEGREES
VENTRICULAR RATE: 72 BPM

## 2021-03-26 PROCEDURE — 99233 SBSQ HOSP IP/OBS HIGH 50: CPT | Performed by: PHYSICIAN ASSISTANT

## 2021-03-26 PROCEDURE — 73562 X-RAY EXAM OF KNEE 3: CPT

## 2021-03-26 PROCEDURE — 93010 ELECTROCARDIOGRAM REPORT: CPT | Performed by: INTERNAL MEDICINE

## 2021-03-26 PROCEDURE — 97163 PT EVAL HIGH COMPLEX 45 MIN: CPT

## 2021-03-26 PROCEDURE — 73610 X-RAY EXAM OF ANKLE: CPT

## 2021-03-26 PROCEDURE — 93306 TTE W/DOPPLER COMPLETE: CPT | Performed by: INTERNAL MEDICINE

## 2021-03-26 PROCEDURE — 93306 TTE W/DOPPLER COMPLETE: CPT

## 2021-03-26 PROCEDURE — 99222 1ST HOSP IP/OBS MODERATE 55: CPT | Performed by: PHYSICIAN ASSISTANT

## 2021-03-26 PROCEDURE — 97110 THERAPEUTIC EXERCISES: CPT

## 2021-03-26 PROCEDURE — 97530 THERAPEUTIC ACTIVITIES: CPT

## 2021-03-26 RX ORDER — LIDOCAINE 50 MG/G
1 PATCH TOPICAL DAILY
Status: DISCONTINUED | OUTPATIENT
Start: 2021-03-26 | End: 2021-03-27 | Stop reason: HOSPADM

## 2021-03-26 RX ADMIN — DICLOFENAC SODIUM 2 G: 10 GEL TOPICAL at 21:33

## 2021-03-26 RX ADMIN — ACETAMINOPHEN 975 MG: 325 TABLET, FILM COATED ORAL at 21:34

## 2021-03-26 RX ADMIN — ACETAMINOPHEN 975 MG: 325 TABLET, FILM COATED ORAL at 05:36

## 2021-03-26 RX ADMIN — ENOXAPARIN SODIUM 40 MG: 40 INJECTION SUBCUTANEOUS at 09:01

## 2021-03-26 RX ADMIN — CALCIUM 500 MG: 500 TABLET ORAL at 09:02

## 2021-03-26 RX ADMIN — DOCUSATE SODIUM AND SENNOSIDES 1 TABLET: 8.6; 5 TABLET, FILM COATED ORAL at 18:38

## 2021-03-26 RX ADMIN — ACETAMINOPHEN 975 MG: 325 TABLET, FILM COATED ORAL at 15:18

## 2021-03-26 RX ADMIN — LORATADINE 10 MG: 10 TABLET ORAL at 09:02

## 2021-03-26 RX ADMIN — GABAPENTIN 300 MG: 300 CAPSULE ORAL at 21:36

## 2021-03-26 RX ADMIN — PROPRANOLOL HYDROCHLORIDE 10 MG: 20 TABLET ORAL at 18:38

## 2021-03-26 RX ADMIN — DICLOFENAC SODIUM 2 G: 10 GEL TOPICAL at 12:18

## 2021-03-26 RX ADMIN — DICLOFENAC SODIUM 2 G: 10 GEL TOPICAL at 09:05

## 2021-03-26 RX ADMIN — GABAPENTIN 300 MG: 300 CAPSULE ORAL at 09:02

## 2021-03-26 RX ADMIN — LISINOPRIL 10 MG: 10 TABLET ORAL at 09:02

## 2021-03-26 RX ADMIN — PANTOPRAZOLE SODIUM 40 MG: 40 TABLET, DELAYED RELEASE ORAL at 05:36

## 2021-03-26 RX ADMIN — DORZOLAMIDE HYDROCHLORIDE AND TIMOLOL MALEATE 1 DROP: 22.3; 6.8 SOLUTION/ DROPS OPHTHALMIC at 09:05

## 2021-03-26 RX ADMIN — LIDOCAINE 5% 1 PATCH: 700 PATCH TOPICAL at 09:03

## 2021-03-26 RX ADMIN — DOCUSATE SODIUM AND SENNOSIDES 1 TABLET: 8.6; 5 TABLET, FILM COATED ORAL at 09:02

## 2021-03-26 RX ADMIN — LEVOTHYROXINE SODIUM 25 MCG: 25 TABLET ORAL at 05:36

## 2021-03-26 RX ADMIN — PROPRANOLOL HYDROCHLORIDE 10 MG: 20 TABLET ORAL at 09:02

## 2021-03-26 RX ADMIN — DORZOLAMIDE HYDROCHLORIDE AND TIMOLOL MALEATE 1 DROP: 22.3; 6.8 SOLUTION/ DROPS OPHTHALMIC at 18:37

## 2021-03-26 RX ADMIN — SERTRALINE HYDROCHLORIDE 25 MG: 25 TABLET ORAL at 09:02

## 2021-03-26 RX ADMIN — GABAPENTIN 300 MG: 300 CAPSULE ORAL at 18:38

## 2021-03-26 RX ADMIN — LATANOPROST 1 DROP: 50 SOLUTION OPHTHALMIC at 21:37

## 2021-03-26 RX ADMIN — MELATONIN TAB 3 MG 10.5 MG: 3 TAB at 21:34

## 2021-03-26 RX ADMIN — DICLOFENAC SODIUM 2 G: 10 GEL TOPICAL at 18:38

## 2021-03-26 RX ADMIN — PROPRANOLOL HYDROCHLORIDE 10 MG: 20 TABLET ORAL at 21:35

## 2021-03-26 NOTE — QUICK NOTE
Unable to see ankle mortise on gravity stress view but there does not appear to be any syndesmotic instability or medial clear space widening  Recommend WBAT in cam boot  PT/OT  Follow up outpatient in 2 weeks, may follow up with sports medicine  Ortho signing off, call with any questions or concerns

## 2021-03-26 NOTE — OCCUPATIONAL THERAPY NOTE
Occupational Therapy Cancellation     Patient Name: Jayme Reyes  WSMGD'G Date: 3/26/2021  Problem List  Principal Problem:    Bilateral lower extremity pain  Active Problems:    Ambulatory dysfunction    Essential hypertension    Glaucoma    Polyneuropathy    Anxiety    Past Medical History  Past Medical History:   Diagnosis Date    Arthritis     Hypertension      Past Surgical History  History reviewed  No pertinent surgical history  03/26/21 1227   OT Last Visit   OT Visit Date 03/26/21  (Friday)   Note Type   Note type Evaluation   Cancel Reasons Other  (pend ortho consult)   Activity Tolerance   Medical Staff Made Aware spoke to PT, [id number] [id number] spoke to RN   Assessment   Assessment OT orders received and chart review completed  Pt is pending orthopedics consult for Nondisplaced fibular head and neck fracture   Will await consult and complete eval as appropriate and schedule allows     [facility], OTR/L

## 2021-03-26 NOTE — ASSESSMENT & PLAN NOTE
· As noted on the x-ray today  · Orthopedic consultation  · Gravity syndesmosis x-ray ordered    · Weight-bearing as tolerated with Cam boot

## 2021-03-26 NOTE — PHYSICAL THERAPY NOTE
PHYSICAL THERAPY EVALUATION NOTE    Patient Name: Landen CROOKSO Date: 3/26/2021     AGE:   80 y o  Mrn:   [id number]  ADMIT DX:  Leg swelling [M79 89]  Bilateral lower extremity edema [R60 0]  Bilateral lower extremity pain [M79 604, M79 605]    Past Medical History:   Diagnosis Date    Arthritis     Hypertension      Length Of Stay: 0  PHYSICAL THERAPY EVALUATION :   Patient's identity confirmed via 2 patient identifiers (full name and ) at start of session    Time in: 0945  Time out: 1009  Total treat time: 24 min     21 1032   PT Last Visit   PT Visit Date 21   Note Type   Note type Evaluation   Pain Assessment   Pain Assessment Tool 0-10   Pain Score   (0 at rest, 7 w/ ambulation)   Pain Location/Orientation Orientation: Right;Location: Knee  (Along joint line)   Home Living   Type of Home Apartment  ([facility])   Home Layout One level;Elevator   Bathroom Shower/Tub Walk-in shower   Bathroom Equipment Grab bars in shower; Shower chair;Grab bars around toilet   [address] [place] [address],[address]  (Rollator)   Additional Comments Pt reports living in a ILF, where she takes an elevator to access her apartment on the 2nd floor  Pt ambulates to the dining escobedo for meals w/ her rollator  Pt states she "never goes anywhere" without her rollator   Prior Function   Level of [place] Independent with ADLs and functional mobility   Lives With Alone; Facility staff   Receives Help From Family;Personal care attendant   ADL Assistance Independent   IADLs Needs assistance  (- drives)   Falls in the last 6 months 0  (Pt denies)   Vocational Retired  ( for [employer])   Comments Pt reports being I w/ ADLs ("it just takes me more time"), and the facility does laundry, cooking, and cleaning  Pt denies falls   States she was getting PT on the "first floor" of the building, but had to stop since it got expensive  Pt has a granddaughter and son who are local and drive her to appointments   Restrictions/Precautions   Weight Bearing Precautions Per Order No   Braces or Orthoses   (B Compression socks (below knee))   Other Precautions Chair Alarm; Bed Alarm; Fall Risk;Pain   General   Additional Pertinent History Xray of B knees pending final read; spoke to Guillermo from [facility] who reports OK for pt to participate in PT evaluation   Family/Caregiver Present No   Cognition   Arousal/Participation Alert   Orientation Level Oriented X4  (Pt identified by full name and )   Memory Within functional limits   Following Commands Follows multistep commands with increased time or repetition   Comments Pt seated OOB in recliner chair upon arrival  She is pleasant and agreeable to participate in PT evaluation  RLE Assessment   RLE Assessment WFL  (pt reports discomfort w/ knee flexion)   RLE Overall AROM   R Knee Flexion Pt w/ valgus knee deformity of R knee  Pt reports TTP at lateral epicondyle, also c/o "tightness" along IT band   Strength RLE   R Hip Flexion 3+/5   R Knee Extension 3+/5   R Ankle Dorsiflexion 3+/5   LLE Assessment   LLE Assessment WFL  (pt reports discomfort w/ knee flexion)   Strength LLE   L Hip Flexion 3+/5   L Knee Extension 3+/5   L Ankle Dorsiflexion 3+/5   Coordination   Sensation X   Light Touch   RLE Light Touch Impaired   RLE Light Touch Comments Pt able to feel; reports N+T in feet but states she has neuropathy; pt w/ notable edema of B knees (R>L) and distally  Unable to appreciate around ankles as compression socks are donned  LLE Light Touch Impaired   LLE Light Touch Comments Pt able to feel; reports N+T in feet but states she has neuropathy; pt w/ notable edema of B knees (R>L) and distally  Unable to appreciate around ankles as compression socks are donned      Bed Mobility   Supine to Sit Unable to assess   Sit to Supine Unable to assess   Transfers   Sit to Stand 4  Minimal assistance   Additional items Assist x 1; Increased time required;Verbal cues   Stand to Sit 4  Minimal assistance   Additional items Assist x 1; Increased time required;Verbal cues   Additional Comments Pt performed STS from recliner chair x 1  Pt uses armrests without cues  Pt is able to stand w/ no UE support w/ CGA/steadying A, when attempting to tie her robe she has a small LOB that therapist assists w/ recovery w/ min A x 1 and pt is able to perform a small stepping strategy to maintain balance  Ambulation/Elevation   Gait pattern Antalgic;Decreased foot clearance;Decreased R stance; Short stride   Gait Assistance   (CGA/steadying A --> close supervision)   Additional items Assist x 1   Assistive Device Other (Comment)  (Rollator)   Distance 120 ft   Curbs Pt initially w/ CGA/steadying A for ambulation due to small LOB previously observed when standing and to assess RLE gait deviations  Pt without LOB or knee buckling during mobility (including turns), therefore only close supervision needed to ambulate the rest of the trial  Pt c/o 7/10 pain w/ ambulating  Upon sitting she reports immediate relief (down to 1-2/10)  Balance   Static Sitting Fair +   Dynamic Sitting Fair   Static Standing Fair -   Ambulatory   (Poor + --> Fair - w/ rollator)   Activity Tolerance   Activity Tolerance Patient limited by pain   Medical Staff Made Aware Spoke to CRISPIN Gomes  from [facility] ([place])   Nurse Made Aware Spoke to DEVANTE Blake who reports pt is appropriate to participate in PT evaluation, spoke to [address] [place] [address]   Assessment   Prognosis Fair   Problem List Decreased strength;Decreased endurance; Impaired balance;Decreased mobility;Pain   Assessment Curt Locke is a 80 y o  Female who [facility]20[facility] from home w/ c/o B LE swelling w/ knee pain and difficulty ambulating and diagnosis of B LE pain  Orders for PT eval and treat received, w/ activity orders of up w/ A and fall precautions   Pt presents w/ comorbidities of primary osteoarthritis, polyneuropathy, HTN, glaucoma,  and personal factors including: advanced age, mobilizing w/ assistive device, limited home support, anxiety and inability to perform IADLs  At baseline, pt mobilizes independently w/ her rollator, and reports 0 falls in the last 6 months  Upon evaluation, pt presents w/ the following deficits: weakness, altered sensation, edema of extremities, impaired balance, decreased endurance and pain limiting functional mobility  Pt requires  Min A x 1 for transfers, and CGA --> CS for gait w/ rollator for 120 ft  Pt's clinical presentation is unstable/unpredictable due to need for assist w/ all phases of mobility when usually mobilizing independently, pain impacting overall mobility status and need for input for mobility technique  Pt is at an increased risk of falls due to physical deficits  Given the above findings, discharge recommendation is for pt to return to ILF w/ HHPT and social support  During this admission, pt would benefit from skilled acute inpatient PT in order to address the abovementioned deficits to maximize function and mobility before DC from acute care  Goals   Patient Goals to go home and have less pain   STG Expiration Date 04/05/21   Short Term Goal #1 Patient will: Increase bilateral LE strength 1/2 grade to facilitate independent mobility, Perform all bed mobility tasks modified independent to decrease fall risk factors, Perform all transfers modified independent to improve independence, Ambulate at least 150 ft  w/ rollator modified independent w/o LOB, Increase all balance 1/2 grade to decrease risk for falls, Complete exercise program independently and Improve Barthel Index score to 90 or greater to facilitate independence   PT Treatment Day 1  (see treatment note below)   Plan   Treatment/Interventions Functional transfer training;LE strengthening/ROM; Therapeutic exercise; Endurance training;Patient/family training;Equipment eval/education; Bed mobility;Gait training   PT Frequency Other (Comment)  (3-5x/wk)   Recommendation   PT Discharge Recommendation Home with skilled therapy; Other (Comment)  (HHPT @ ILF)   Equipment Recommended   (Rollator)   AM-PAC Basic Mobility Inpatient   Turning in Bed Without Bedrails 3   Lying on Back to Sitting on Edge of Flat Bed 3   Moving Bed to Chair 3   Standing Up From Chair 3   Walk in Room 3   Climb 3-5 Stairs 3   Basic Mobility Inpatient Raw Score 18   Basic Mobility Standardized Score 41 05   Barthel Index   Feeding 10   Bathing 0   Grooming Score 5   Dressing Score 5   Bladder Score 10   Bowels Score 10   Toilet Use Score 5   Transfers (Bed/Chair) Score 10   Mobility (Level Surface) Score 10   Stairs Score 0   Barthel Index Score 65            PHYSICAL THERAPY TREATMENT NOTE    Name: Eh Martino  : 1930  Time in: 1009  Time out: 1032  Total treat time: 23 min    S: Pt seated OOB in recliner chair  She reports knee pain is 1/10 sitting at the recliner chair  She is agreeable to participate in PT intervention  O: Session focused on blocked practice of STS transfers and HEP/modality for R knee pain  From recliner chair and utilizing armrests, pt is able to perform STS x 6 w/ supervision and increased time  Pt is able to achieve a full stand and no UE support once standing from the chair, and no LOB observed throughout session  Pt reports feeling these are much better than the first during the evaluation  Time was spent reviewing techniques for pain management, including ice (and education on never placing ice or heat over a lidocane patch), elevation of LEs and alternating between up and down on the recliner at home  Also educated pt on ankle pumps and circles to assist w/ LE edema  Provided education on quad sets and LAQ to strengthen quad  Pt verbalized understanding of all education w/ no further questions   Pt seated OOB in recliner chair w/ chair alarm activated, call bell and room phone within reach w/ all needs met  A: Pt is agreeable to participate in PT intervention  Pt is able to demonstrate quick progress of STS transfers from recliner chair w/ repetition, as she is able to perform all 6 in a row w/ supervision and increased time  Pt is also able to demonstrate improvements in static standing balance, does not have LOB when standing up from chair  Time was also spent providing education on HEP and pain management techniques/modalities, including quad strengthening, alternating elevating LEs at home in recliner chair, taking seated rest breaks as needed to alleviate pain (as pain increases w/ WB tasks), and HEP of ankle pumps, knee LAQ to tolerance, and encouraging continued movement  Pt verbalized understanding w/ no further questions  Recommend initiating more functional exercises, such as mini squats in upcoming sessions and work on STS from various height surfaces       P: Continue per evaluation above    Skilled inpatient PT is recommended during this admission in order to progress patient toward goals so patient is able to maximize independence    Aniyah Alejandro, PT, DPT

## 2021-03-26 NOTE — CONSULTS
Orthopedics   Dante Lacey 80 y o  female MRN: [id number]  Unit/Bed#: AN CAR NON INV      Chief Complaint:   Right knee and ankle pain    HPI:  80 y o  female who is seen in consultation requested by Luiz Castle PA-C for evaluation of right proximal fibula fracture  Patient denies any known injury to the left leg or ankle  She was admitted for ambulatory dysfunction due to bilateral lower extremity generalized pain and swelling, which she states has been ongoing for 1 year  She does report pain over the proximal fibula but she is unsure how long this pain has been present  She currently resides as an assisted living  Review Of Systems:   · Skin: Normal  · Neuro: See HPI  · Musculoskeletal: See HPI  · 14 point review of systems negative except as stated above     Past Medical History:   Past Medical History:   Diagnosis Date    Arthritis     Hypertension        Past Surgical History:   History reviewed  No pertinent surgical history  Family History:  Family history reviewed and non-contributory  History reviewed  No pertinent family history  Social History:  Social History     Socioeconomic History    Marital status:       Spouse name: None    Number of children: None    Years of education: None    Highest education level: None   Occupational History    None   Social Needs    Financial resource strain: None    Food insecurity     Worry: None     Inability: None    Transportation needs     Medical: None     Non-medical: None   Tobacco Use    Smoking status: Never Smoker    Smokeless tobacco: Never Used   Substance and Sexual Activity    Alcohol use: None    Drug use: None    Sexual activity: None   Lifestyle    Physical activity     Days per week: None     Minutes per session: None    Stress: None   Relationships    Social connections     Talks on phone: None     Gets together: None     Attends Jainism service: None     Active member of club or organization: None     Attends meetings of clubs or organizations: None     Relationship status: None    Intimate partner violence     Fear of current or ex partner: None     Emotionally abused: None     Physically abused: None     Forced sexual activity: None   Other Topics Concern    None   Social History Narrative    None       Allergies:    Allergies   Allergen Reactions    Iodinated Diagnostic Agents Hives    Nsaids GI Intolerance    Shellfish-Derived Products Hives           Labs:  0   Lab Value Date/Time    HCT 37 5 03/25/2021 1221    HGB 12 3 03/25/2021 1221    INR 1 00 03/25/2021 1402    WBC 6 37 03/25/2021 1221       Meds:    Current Facility-Administered Medications:     acetaminophen (TYLENOL) tablet 975 mg, 975 mg, Oral, Q8H [address], Yessy Riggins MD, 975 mg at 03/26/21 0536    ALPRAZolam (XANAX) tablet 0 25 mg, 0 25 mg, Oral, BID PRN, Yessy Riggins MD    calcium carbonate (OYSTER SHELL,OSCAL) 500 mg tablet 500 mg, 500 mg, Oral, Daily, Yessy Riggins MD, 500 mg at 03/26/21 0902    cycloSPORINE (RESTASIS) 0 05 % ophthalmic emulsion 1 drop, 1 drop, Both Eyes, BID, Yessy Riggins MD    Diclofenac Sodium (VOLTAREN) 1 % topical gel 2 g, 2 g, Topical, 4x Daily, Yessy Riggins MD, 2 g at 03/26/21 0905    dorzolamide-timolol (COSOPT) 22 3-6 8 MG/ML ophthalmic solution 1 drop, 1 drop, Both Eyes, BID, Yessy Riggins MD, 1 drop at 03/26/21 0905    enoxaparin (LOVENOX) subcutaneous injection 40 mg, 40 mg, Subcutaneous, Daily, Yessy Riggins MD, 40 mg at 03/26/21 0901    gabapentin (NEURONTIN) capsule 300 mg, 300 mg, Oral, TID, Yessy Riggins MD, 300 mg at 03/26/21 0902    HYDROmorphone (DILAUDID) injection 0 2 mg, 0 2 mg, Intravenous, Q4H PRN, Yessy Riggins MD    latanoprost (XALATAN) 0 005 % ophthalmic solution 1 drop, 1 drop, Both Eyes, HS, Yessy Riggins MD, 1 drop at 03/25/21 2107    levothyroxine tablet 25 mcg, 25 mcg, Oral, Early Morning, Yessy Riggins MD, 25 mcg at 03/26/21 0536    lidocaine (LIDODERM) 5 % patch 1 patch, 1 patch, Topical, Daily, Ina Silva PA-C, 1 patch at 03/26/21 0903    lisinopril (ZESTRIL) tablet 10 mg, 10 mg, Oral, Daily, Onel Calvin MD, 10 mg at 03/26/21 0902    loratadine (CLARITIN) tablet 10 mg, 10 mg, Oral, Daily, Onel Calvin MD, 10 mg at 03/26/21 0902    melatonin tablet 10 5 mg, 10 5 mg, Oral, HS, Onel Calvin MD, 10 5 mg at 03/25/21 2106    naloxone (NARCAN) 0 04 mg/mL syringe 0 04 mg, 0 04 mg, Intravenous, Q1MIN PRN, Onel Calvin MD    ondansetron (ZOFRAN) injection 4 mg, 4 mg, Intravenous, Q4H PRN, Onel Calvin MD    oxyCODONE (ROXICODONE) IR tablet 2 5 mg, 2 5 mg, Oral, Q4H PRN, Onel Calvin MD    oxyCODONE (ROXICODONE) IR tablet 5 mg, 5 mg, Oral, Q4H PRN, Onel Calvin MD    pantoprazole (PROTONIX) EC tablet 40 mg, 40 mg, Oral, Early Morning, Onel Calvin MD, 40 mg at 03/26/21 0536    propranolol (INDERAL) tablet 10 mg, 10 mg, Oral, TID, Onel Calvin MD, 10 mg at 03/26/21 0902    senna-docusate sodium (SENOKOT S) 8 6-50 mg per tablet 1 tablet, 1 tablet, Oral, BID, Onel Calvin MD, 1 tablet at 03/26/21 0902    sertraline (ZOLOFT) tablet 25 mg, 25 mg, Oral, Daily, Onel Calvin MD, 25 mg at 03/26/21 3075    Blood Culture:   No results found for: BLOODCX    Wound Culture:   No results found for: WOUNDCULT    Ins and Outs:  I/O last 24 hours: In: 140 [P O :140]  Out: 900 [Urine:900]          Physical Exam:   /81 (BP Location: Right arm)   Pulse 71   Temp 97 8 °F (36 6 °C) (Oral)   Resp 22   Ht 5' 4" (1 626 m)   Wt 77 4 kg (170 lb 10 2 oz)   SpO2 92%   BMI 29 29 kg/m²   Gen: Alert and oriented to person, place, time  HEENT: EOMI, eyes clear, moist mucus membranes, hearing intact  Respiratory: Bilateral chest rise   No audible wheezing found  Cardiovascular: Regular Rate and Rhythm  Abdomen: soft nontender/nondistended  Musculoskeletal: right lower extremity  · Skin warm and well perfused  · Mild tenderness to palpation over the proximal fibula and lateral ankle as well as diffusely throughout the lower leg  · Mild edema noted to bilateral lower extremities  · SILT s/s/sp/dp/t  · Motor intact 5/5 strength with hip flexion/extension, knee flexion/extension, ankle dorsi/plantar flexion, EHL/FHL  · 10 degrees of ankle dorsiflexion, 40 degrees of plantar flexion  · 2+ DP pulse    Tertiary: no tenderness over all other joints/long bones as except already stated  Radiology:   I personally reviewed the films  X-ray of the right knee reviewed which demonstrates a nondisplaced fibular head/neck fracture  There is also significant tricompartmental knee arthritis  Ordered gravity stress x-ray of the right ankle to evaluate for possible maisonneuve injury  Assessment:  80 y  o female with right nondisplaced fibular head/neck fracture     Plan:   · May continue WBAT RLE at this time  Recommend cam boot if she is having ankle pain with ambulation  · Will follow up on gravity stress x-ray and make final recommendations based on results  · PT/OT  · Pain control per primary team  · DVT ppx per primary team  · Body mass index is 29 29 kg/m²  mildly obese  Recommend behavior modifications, nutrition and physical activity    · Ortho will follow     Anthony Hernandez PA-C

## 2021-03-26 NOTE — DISCHARGE INSTR - AVS FIRST PAGE
Dear Dante Lacey,     It was our pleasure to care for you here at [facility]  It is our hope that we were always able to exceed the expected standards for your care during your stay  You were hospitalized due to bilateral leg swelling  You were cared for on the 3rd floor by Damir Carl PA-C under the service of Gamaliel Teague MD with the [facility] who covers for your primary care physician (PCP), Elissa Funez MD, while you were hospitalized  If you have any questions or concerns related to this hospitalization, you may contact us at [phone]  For follow up as well as any medication refills, we recommend that you follow up with your primary care physician  A registered nurse will reach out to you by phone within a few days after your discharge to answer any additional questions that you may have after going home  However, at this time we provide for you here, the most important instructions / recommendations at discharge:     · Notable Medication Adjustments -   · Continue to take all of your home medications  · Testing Required after Discharge -   · You should follow-up with an orthopedic surgeon upon discharge as we discussed  The phone number to make an appointment is below  · Important follow up information -   · Please follow-up with your primary care physician as well as Orthopedics  · Other Instructions -   · Please use compression stockings whenever possible, and elevate your legs as we discussed  · Please review this entire after visit summary as additional general instructions including medication list, appointments, activity, diet, any pertinent wound care, and other additional recommendations from your care team that may be provided for you        Sincerely,     Damir Carl PA-C

## 2021-03-26 NOTE — DISCHARGE SUMMARY
[facility]  Discharge- Landen Chowdary 8/25/1930, 80 y o  female MRN: [id number]  Unit/Bed#: S -01 Encounter: [id number]  Primary Care Provider: Charmayne Breen, MD   Date and time admitted to hospital: 3/25/2021 12:45 PM    * Bilateral lower extremity pain  Assessment & Plan  · POA, presents with 2 weeks history progressively worsening bilateral lower extremity pain and swelling initially below the knees and now radiating up to the thighs  · Knee x-ray with fibular head and neck fracture  Plan as below  · Also has history of polyneuropathy and is on gabapentin 300 mg t i d   · Bilateral lower extremity venous duplex studies obtained in the ED without evidence for DVT or superficial thrombophlebitis  · Suspect symptoms likely musculoskeletal    · Scheduled Tylenol for pain control, Voltaren gel  · Compression stockings for extremity swelling  · Echo:  Preserved EF, G1DD  · PT/OT evaluations recs for home therapy    Nondisplaced fracture of right fibula  Assessment & Plan  · As noted on x-ray  · Orthopedic consultation appreciated  · Gravity syndesmosis x-ray with no ankle instability  · Weight-bearing as tolerated with Cam boot  · Follow-up in around 2 weeks with Sports Medicine, this was discussed with the patient and her granddaughter  Anxiety  Assessment & Plan  · Continue Xanax at home dose    Polyneuropathy  Assessment & Plan  · Follows [facility]ology at [facility]  · Continue gabapentin    Glaucoma  Assessment & Plan  · Continue eyedrops    Essential hypertension  Assessment & Plan  · Blood pressure elevated to the 672N systolic in the ER, likely related to pain  · Continue home dose lisinopril, propranolol, control pain  · Now improved  Ambulatory dysfunction  Assessment & Plan  · POA, secondary to bilateral lower extremity pain and fibula fx  · Patient with progressively worsening ambulation and difficulty with ADLs  · PT/OT recs for home therapy      Discharging Physician / Practitioner: Bernadine Dunlap PA-C  PCP: Ethan Banda MD  Admission Date:   Admission Orders (From admission, onward)     Ordered        03/26/21 1321  Inpatient Admission  Once         03/25/21 1536  Place in Observation  Once                   Discharge Date: 03/27/21    Resolved Problems  Date Reviewed: 3/25/2021    None          Consultations During Hospital Stay:  · PT/OT - home therapy  · Orthopedics    Procedures Performed:   · None     Significant Findings / Test Results:   · Echo:  EF 60%, no regional wall abnormalities, mild wall thickness, grade 1 diastolic dysfunction  · Xray R knee:  Nondisplaced fibular head and neck fracture  · X-ray ankle syndesmosis with ankle stability    Incidental Findings:   · None      Test Results Pending at Discharge (will require follow up): · None      Outpatient Tests Requested:  · None     Complications:  None     Reason for Admission: knee pain, LE edema    Hospital Course: Leighann Melara is a 80 y o  female patient who originally presented to the hospital on 3/25/2021 due to bilateral lower extremity pain, as well as swelling of her right knee  She reported increased difficulty with ADL over the past few weeks as a result  She lives at home, independently  Reports in the past that she had gotten cortisone injections in her knee, however this has not helped as of late  Denies any fevers, chills  Reports that her lower legs do swell up but she does not elevate them nor were compression stockings  The patient was admitted, received echocardiogram which revealed EF 63%, grade 1 diastolic dysfunction  She underwent right knee x-ray which revealed fracture of fibula  She saw PT/OT, had compression stockings placed as well as elevated her extremities with some relief  PT recommends home therapy with Cam boot, which was set up prior to discharge    She was discharged in stable condition with outpatient follow-up with Orthopedic surgery in 2 weeks   Throughout her stay there was no evidence of infection, fevers, chills  Please see above list of diagnoses and related plan for additional information  Condition at Discharge: good     Discharge Day Visit / Exam:     Subjective:  Patient still having pain in her right knee, however it improves when she is not walking  Has had chronic arthritis for years  Denies any fevers, chills  No erythema noted  No events per nursing  Work with PT today, recommending home therapy  Patient desires to go home today  Vitals: Blood Pressure: 129/85 (03/27/21 0733)  Pulse: 80 (03/27/21 0733)  Temperature: 97 7 °F (36 5 °C) (03/27/21 0733)  Temp Source: Oral (03/27/21 0733)  Respirations: 18 (03/27/21 0733)  Height: 5' 4" (162 6 cm) (03/25/21 1756)  Weight - Scale: 77 4 kg (170 lb 10 2 oz) (03/26/21 0501)  SpO2: 92 % (03/27/21 0733)  Exam:   Physical Exam  Vitals signs and nursing note reviewed  Constitutional:       General: She is not in acute distress  Appearance: Normal appearance  HENT:      Head: Normocephalic  Mouth/Throat:      Mouth: Mucous membranes are moist    Eyes:      General: No scleral icterus  Pupils: Pupils are equal, round, and reactive to light  Cardiovascular:      Rate and Rhythm: Normal rate and regular rhythm  Heart sounds: No murmur  Pulmonary:      Effort: Pulmonary effort is normal  No respiratory distress  Breath sounds: Normal breath sounds  No wheezing, rhonchi or rales  Abdominal:      General: Bowel sounds are normal  There is no distension  Palpations: Abdomen is soft  Tenderness: There is no abdominal tenderness  Musculoskeletal:         General: Tenderness (right knee tenderness over joint  No erthema or warmth) and deformity (chronic arthritic changes noted) present  No swelling  Right lower leg: Edema (non-pitting) present  Left lower leg: Edema (non-pitting) present     Skin:     Capillary Refill: Capillary refill takes less than 2 seconds  Neurological:      General: No focal deficit present  Mental Status: She is alert and oriented to person, place, and time  Mental status is at baseline  Discussion with Family: called granddaughter     Discharge instructions/Information to patient and family:   See after visit summary for information provided to patient and family  Provisions for Follow-Up Care:  See after visit summary for information related to follow-up care and any pertinent home health orders  Disposition:     Home with VNA Services (Reminder: Complete face to face encounter)    For Discharges to [facility] SNF:   · Not Applicable to this Patient - Not Applicable to this Patient    Planned Readmission: no     Discharge Statement:  I spent 45 minutes discharging the patient  This time was spent on the day of discharge  I had direct contact with the patient on the day of discharge  Greater than 50% of the total time was spent examining patient, answering all patient questions, arranging and discussing plan of care with patient as well as directly providing post-discharge instructions  Additional time then spent on discharge activities  Discharge Medications:  See after visit summary for reconciled discharge medications provided to patient and family        ** Please Note: This note has been constructed using a voice recognition system **

## 2021-03-26 NOTE — ASSESSMENT & PLAN NOTE
· POA, presents with 2 weeks history progressively worsening bilateral lower extremity pain and swelling initially below the knees and now radiating up to the thighs  · Knee x-ray with fibular head and neck fracture  Plan as below  · Also has history of polyneuropathy and is on gabapentin 300 mg t i d   · Bilateral lower extremity venous duplex studies obtained in the ED without evidence for DVT or superficial thrombophlebitis  · Suspect symptoms likely musculoskeletal  X ray knee with arthritic changes on my read, awaiting official read  · Scheduled Tylenol for pain control, Voltaren gel  · Compression stockings for extremity swelling    · Echo:  Pending  · PT/OT evaluations recs for home therapy

## 2021-03-26 NOTE — ASSESSMENT & PLAN NOTE
· POA, secondary to bilateral lower extremity pain  · Patient with progressively worsening ambulation and difficulty with ADLs  · PT/OT recs for home therapy

## 2021-03-26 NOTE — UTILIZATION REVIEW
Initial Clinical Review  OBSERVATION ADMISSION 3/25/2021 1536 CONVERTED TO INPATIENT ADMISSION 3/26/2021 1321 DUE TO WORSENING BILAT LE PAIN W/ WORSENING AMBULATION W FAILED OUTPATIENT TREATMENT -XR REVEALING RIGHT PROX FIBULA HEAD/ NECK FRACTURE REQ CONSULTS FOR MEDICAL MANAGEMENT  Admission Orders (From admission, onward)     Ordered        03/26/21 1321  Inpatient Admission  Once         03/25/21 1536  Place in Observation  Once                     Admission: Date/Time/Statement:     03/26/21 1322  Inpatient Admission Once     Question Answer Comment   Level of Care Med Surg    Estimated length of stay More than 2 Midnights    Certification I certify that inpatient services are medically necessary for this patient for a duration of greater than two midnights  See H&P and MD Progress Notes for additional information about the patient's course of treatment  03/26/21 1321       ED Arrival Information     Expected Arrival Acuity Means of Arrival Escorted By Service Admission Type    - 3/25/2021 11:42 Urgent Walk-In Family Member Hospitalist Urgent    Arrival Complaint    Leg Swelling        Chief Complaint   Patient presents with    Leg Swelling     Pt presents to the ED with c/o right leg swelling that has become ingreasingly worse over the past couple of days  Pt reports no injury to the leg, reports difficulty ambulating  Assessment/Plan: 81 yo female PMHx HTN , osteoarthritis, chronic pain, polyneuropathy on gabapentin  to ED from home reports bilateral leg pain & swelling (r>L) occurring over 2 week  Seen by PCP & placed on a "few days of water pills" without improvement of symptoms  Previously  Had CXr, admits to decreased mobility & exercise in her independent living status  IN ED: exam R>L +1 ankle edema & tenderness posterior knee wo joint swelling or tenderness  Admit Observation w bilateral LE pain   Schedule pain meds, hold Mobic due to extremity edema; xr bilateral knees, compression stockings  BNP elevated, check echo  PT/OT  Cont home HTN meds  3/26/2021 PROVIDER  POA, presents with 2 weeks history progressively worsening bilateral lower extremity pain and swelling initially below the knees and now radiating up to the thighs  Noted on XR today patient with fibular head & neck FXs; consult Orthopedics, gravity syndesmosis xray ordered weight bearing as tolerated w CAM Boot  BILAT LE VAS w/o DVT or thrombophlebitis  PT/OT for eval for home therapy  3/26/2021 ORTHO  evaluation of right proximal fibula fracture  Patient denies any known injury to the left leg or ankle  She was admitted for ambulatory dysfunction due to bilateral lower extremity generalized pain and swelling, which she states has been ongoing for 1 year  She does report pain over the proximal fibula but she is unsure how long this pain has been present  She currently resides as an assisted living     Plan:   · May continue WBAT RLE at this time  Recommend cam boot if she is having ankle pain with ambulation  · Will follow up on gravity stress x-ray and make final recommendations based on results  · PT/OT  · Pain control per primary team  · DVT ppx per primary team  · Body mass index is 29 29 kg/m²  mildly obese  Recommend behavior modifications, nutrition and physical activity  Ortho will follow   3/26/2021 PM note Ortho  Unable to see ankle mortise on gravity stress view but there does not appear to be any syndesmotic instability or medial clear space widening  Recommend WBAT in cam boot  PT/OT   Follow up outpatient in 2 weeks, may follow up with sports medicine  ED Triage Vitals   Temperature Pulse Respirations Blood Pressure SpO2   03/25/21 1220 03/25/21 1218 03/25/21 1218 03/25/21 1218 03/25/21 1218   98 6 °F (37 °C) 78 16 (!) 191/86 96 %      Temp Source Heart Rate Source Patient Position - Orthostatic VS BP Location FiO2 (%)   03/25/21 1218 03/25/21 1218 03/25/21 1347 03/25/21 1218 --   Oral Monitor Lying Left arm Pain Score       03/25/21 1218       7          Wt Readings from Last 1 Encounters:   03/26/21 77 4 kg (170 lb 10 2 oz)     Additional Vital Signs:   Date/Time  Temp  Pulse  Resp  BP  MAP (mmHg)  SpO2  O2 Device  Patient Position - Orthostatic VS   03/26/21 0700  97 8 °F (36 6 °C)  71  22  157/81  110  92 %  None (Room air)  Sitting   03/25/21 2200  98 °F (36 7 °C)  76  20  170/76  --  92 %  None (Room air)  Lying   03/25/21 2105  --  75  --  170/76  --  --  --  --   03/25/21 1756  98 2 °F (36 8 °C)  73  18  180/88Abnormal   --  91 %  None (Room air)  Sitting   03/25/21 1657  --  72  16  187/79Abnormal   --  94 %  --  --   03/25/21 1445  --  72  16  169/73  105  93 %  None (Room air)  Sitting   03/25/21 1443  --  --  --  --  --  --  None (Room air)  --   03/25/21 1347  98 2 °F (36 8 °C)  71  17  174/76Abnormal   109  98 %  None (Room air)  Lying   03/25/21 1220  98 6 °F (37 °C)  --  --  --  --  --  --  --   03/25/21 1218  --  78  16  191/86Abnormal   --  96 %  None (Room air)  --      Weights (last 14 days)    Date/Time  Weight  Weight Method  Height   03/26/21 0501  77 4 kg (170 lb 10 2 oz)  Standing scale  --   03/25/21 1756  77 9 kg (171 lb 11 8 oz)  Standing scale  5' 4" (1 626 m)       Pertinent Labs/Diagnostic Test Results:       Results from last 7 days   Lab Units 03/25/21  1221   WBC Thousand/uL 6 37   HEMOGLOBIN g/dL 12 3   HEMATOCRIT % 37 5   PLATELETS Thousands/uL 258   NEUTROS ABS Thousands/µL 4 22         Results from last 7 days   Lab Units 03/25/21  1221   SODIUM mmol/L 141   POTASSIUM mmol/L 4 4   CHLORIDE mmol/L 105   CO2 mmol/L 28   ANION GAP mmol/L 8   BUN mg/dL 23   CREATININE mg/dL 0 76   EGFR ml/min/1 73sq m 69   CALCIUM mg/dL 8 7     Results from last 7 days   Lab Units 03/25/21  1221   AST U/L 16   ALT U/L 19   ALK PHOS U/L 98   TOTAL PROTEIN g/dL 6 8   ALBUMIN g/dL 3 4*   TOTAL BILIRUBIN mg/dL 0 33         Results from last 7 days   Lab Units 03/25/21  1221   GLUCOSE RANDOM mg/dL 101 No results found for: BETA-HYDROXYBUTYRATE                   Results from last 7 days   Lab Units 03/25/21  1221   TROPONIN I ng/mL <0 02         Results from last 7 days   Lab Units 03/25/21  1402   PROTIME seconds 13 3   INR  1 00   PTT seconds 33                         Results from last 7 days   Lab Units 03/25/21  1221   NT-PRO BNP pg/mL 510*     Vas Lower Limb Venous Duplex Study, Complete Bilateral     Result Date: 3/25/2021  Narrative:  [facility] REPORT CLINICAL: Indications: Patient presents with bilateral lower extremity edema and pain around the knee (right > left)  Operative History: Patient denies any cardiovascular procedures Risk Factors The patient has history of HTN  CONCLUSION: RIGHT LOWER LIMB: No evidence of acute or chronic deep vein thrombosis  No evidence of superficial thrombophlebitis noted  Doppler evaluation shows a normal response to augmentation maneuvers  Popliteal, posterior tibial and anterior tibial arterial Doppler waveforms are triphasic  LEFT LOWER LIMB: No evidence of acute or chronic deep vein thrombosis  No evidence of superficial thrombophlebitis noted  Doppler evaluation shows a normal response to augmentation maneuvers  Popliteal, posterior tibial and anterior tibial arterial Doppler waveforms are triphasic        XR chest 1 view portable   Final Result by  MD (03/25 2456)      Minimal atelectasis or scarring  XR knee 3 vw right non injury    (Results Pending)     ekg nsr  ED Treatment:   Medication Administration from 03/25/2021 1142 to 03/25/2021 1755     None        Past Medical History:   Diagnosis Date    Arthritis     Hypertension      Present on Admission:   Bilateral lower extremity pain   Ambulatory dysfunction   Essential hypertension   Glaucoma   Polyneuropathy   Anxiety      Admitting Diagnosis: Leg swelling [M79 89]  Bilateral lower extremity edema [R60 0]  Bilateral lower extremity pain [M79 604, M79 605]  Age/Sex: 80 y o  female  Admission Orders:  Echo  Knee high stockings  Elevate extremities  Aqua k  Scheduled Medications:  acetaminophen, 975 mg, Oral, Q8H [address]  calcium carbonate, 500 mg, Oral, Daily  cycloSPORINE, 1 drop, Both Eyes, BID  Diclofenac Sodium, 2 g, Topical, 4x Daily  dorzolamide-timolol, 1 drop, Both Eyes, BID  enoxaparin, 40 mg, Subcutaneous, Daily  gabapentin, 300 mg, Oral, TID  latanoprost, 1 drop, Both Eyes, HS  levothyroxine, 25 mcg, Oral, Early Morning  lidocaine, 1 patch, Topical, Daily  lisinopril, 10 mg, Oral, Daily  loratadine, 10 mg, Oral, Daily  melatonin, 10 5 mg, Oral, HS  pantoprazole, 40 mg, Oral, Early Morning  propranolol, 10 mg, Oral, TID  senna-docusate sodium, 1 tablet, Oral, BID  sertraline, 25 mg, Oral, Daily    Continuous IV Infusions:     PRN Meds:  ALPRAZolam, 0 25 mg, Oral, BID PRN  HYDROmorphone, 0 2 mg, Intravenous, Q4H PRN  naloxone, 0 04 mg, Intravenous, Q1MIN PRN  ondansetron, 4 mg, Intravenous, Q4H PRN  oxyCODONE, 2 5 mg, Oral, Q4H PRN  oxyCODONE, 5 mg, Oral, Q4H PRN      Network Utilization Review Department  ATTENTION: Please call with any questions or concerns to [phone] and carefully listen to the prompts so that you are directed to the right person  All voicemails are confidential   Maxwell Davison all requests for admission clinical reviews, approved or denied determinations and any other requests to dedicated fax number below belonging to the campus where the patient is receiving treatment   List of dedicated fax numbers for the Facilities:  [address] [place] [address] DENIALS (Administrative/Medical Necessity) [phone]   [address]  (Maternity/NICU/Pediatrics) 261 [facility] [address] ([place] [address]) [phone]     [address] [facility] [address] [phone]   [address] [phone]

## 2021-03-26 NOTE — PLAN OF CARE
Problem: Potential for Falls  Goal: Patient will remain free of falls  Description: INTERVENTIONS:  - Assess patient frequently for physical needs  -  Identify cognitive and physical deficits and behaviors that affect risk of falls    -  [place] fall precautions as indicated by assessment   - Educate patient/family on patient safety including physical limitations  - Instruct patient to call for assistance with activity based on assessment  - Modify environment to reduce risk of injury  - Consider OT/PT consult to assist with strengthening/mobility  Outcome: Progressing     Problem: PAIN - ADULT  Goal: Verbalizes/displays adequate comfort level or baseline comfort level  Description: Interventions:  - Encourage patient to monitor pain and request assistance  - Assess pain using appropriate pain scale  - Administer analgesics based on type and severity of pain and evaluate response  - Implement non-pharmacological measures as appropriate and evaluate response  - Consider cultural and social influences on pain and pain management  - Notify physician/advanced practitioner if interventions unsuccessful or patient reports new pain  Outcome: Progressing     Problem: INFECTION - ADULT  Goal: Absence or prevention of progression during hospitalization  Description: INTERVENTIONS:  - Assess and monitor for signs and symptoms of infection  - Monitor lab/diagnostic results  - Monitor all insertion sites, i e  indwelling lines, tubes, and drains  - Monitor endotracheal if appropriate and nasal secretions for changes in amount and color  - [place] appropriate cooling/warming therapies per order  - Administer medications as ordered  - Instruct and encourage patient and family to use good hand hygiene technique  - Identify and instruct in appropriate isolation precautions for identified infection/condition  Outcome: Progressing  Goal: Absence of fever/infection during neutropenic period  Description: INTERVENTIONS:  - Monitor WBC    Outcome: Progressing     Problem: SAFETY ADULT  Goal: Patient will remain free of falls  Description: INTERVENTIONS:  - Assess patient frequently for physical needs  -  Identify cognitive and physical deficits and behaviors that affect risk of falls    -  [place] fall precautions as indicated by assessment   - Educate patient/family on patient safety including physical limitations  - Instruct patient to call for assistance with activity based on assessment  - Modify environment to reduce risk of injury  - Consider OT/PT consult to assist with strengthening/mobility  Outcome: Progressing  Goal: Maintain or return to baseline ADL function  Description: INTERVENTIONS:  -  Assess patient's ability to carry out ADLs; assess patient's baseline for ADL function and identify physical deficits which impact ability to perform ADLs (bathing, care of mouth/teeth, toileting, grooming, dressing, etc )  - Assess/evaluate cause of self-care deficits   - Assess range of motion  - Assess patient's mobility; develop plan if impaired  - Assess patient's need for assistive devices and provide as appropriate  - Encourage maximum independence but intervene and supervise when necessary  - Involve family in performance of ADLs  - Assess for home care needs following discharge   - Consider OT consult to assist with ADL evaluation and planning for discharge  - Provide patient education as appropriate  Outcome: Progressing  Goal: Maintain or return mobility status to optimal level  Description: INTERVENTIONS:  - Assess patient's baseline mobility status (ambulation, transfers, stairs, etc )    - Identify cognitive and physical deficits and behaviors that affect mobility  - Identify mobility aids required to assist with transfers and/or ambulation (gait belt, sit-to-stand, lift, walker, cane, etc )  - [place] fall precautions as indicated by assessment  - Record patient progress and toleration of activity level on Mobility SBAR; progress patient to next Phase/Stage  - Instruct patient to call for assistance with activity based on assessment  - Consider rehabilitation consult to assist with strengthening/weightbearing, etc   Outcome: Progressing     Problem: DISCHARGE PLANNING  Goal: Discharge to home or other facility with appropriate resources  Description: INTERVENTIONS:  - Identify barriers to discharge w/patient and caregiver  - Arrange for needed discharge resources and transportation as appropriate  - Identify discharge learning needs (meds, wound care, etc )  - Arrange for interpretive services to assist at discharge as needed  - Refer to Case Management Department for coordinating discharge planning if the patient needs post-hospital services based on physician/advanced practitioner order or complex needs related to functional status, cognitive ability, or social support system  Outcome: Progressing     Problem: Knowledge Deficit  Goal: Patient/family/caregiver demonstrates understanding of disease process, treatment plan, medications, and discharge instructions  Description: Complete learning assessment and assess knowledge base  Interventions:  - Provide teaching at level of understanding  - Provide teaching via preferred learning methods  Outcome: Progressing     Problem: NEUROSENSORY - ADULT  Goal: Achieves maximal functionality and self care  Description: INTERVENTIONS  - Monitor swallowing and airway patency with patient fatigue and changes in neurological status  - Encourage and assist patient to increase activity and self care     - Encourage visually impaired, hearing impaired and aphasic patients to use assistive/communication devices  Outcome: Progressing     Problem: RESPIRATORY - ADULT  Goal: Achieves optimal ventilation and oxygenation  Description: INTERVENTIONS:  - Assess for changes in respiratory status  - Assess for changes in mentation and behavior  - Position to facilitate oxygenation and minimize respiratory effort  - Oxygen administered by appropriate delivery if ordered  - Initiate smoking cessation education as indicated  - Encourage broncho-pulmonary hygiene including cough, deep breathe, Incentive Spirometry  - Assess the need for suctioning and aspirate as needed  - Assess and instruct to report SOB or any respiratory difficulty  - Respiratory Therapy support as indicated  Outcome: Progressing     Problem: GASTROINTESTINAL - ADULT  Goal: Maintains adequate nutritional intake  Description: INTERVENTIONS:  - Monitor percentage of each meal consumed  - Identify factors contributing to decreased intake, treat as appropriate  - Assist with meals as needed  - Monitor I&O, weight, and lab values if indicated  - Obtain nutrition services referral as needed  Outcome: Progressing     Problem: GENITOURINARY - ADULT  Goal: Absence of urinary retention  Description: INTERVENTIONS:  - Assess patients ability to void and empty bladder  - Monitor I/O  - Bladder scan as needed  - Discuss with physician/AP medications to alleviate retention as needed  - Discuss catheterization for long term situations as appropriate  Outcome: Progressing     Problem: METABOLIC, FLUID AND ELECTROLYTES - ADULT  Goal: Electrolytes maintained within normal limits  Description: INTERVENTIONS:  - Monitor labs and assess patient for signs and symptoms of electrolyte imbalances  - Administer electrolyte replacement as ordered  - Monitor response to electrolyte replacements, including repeat lab results as appropriate  - Instruct patient on fluid and nutrition as appropriate  Outcome: Progressing  Goal: Fluid balance maintained  Description: INTERVENTIONS:  - Monitor labs   - Monitor I/O and WT  - Instruct patient on fluid and nutrition as appropriate  - Assess for signs & symptoms of volume excess or deficit  Outcome: Progressing     Problem: SKIN/TISSUE INTEGRITY - ADULT  Goal: Skin integrity remains intact  Description: INTERVENTIONS  - Identify patients at risk for skin breakdown  - Assess and monitor skin integrity  - Assess and monitor nutrition and hydration status  - Monitor labs (i e  albumin)  - Assess for incontinence   - Turn and reposition patient  - Assist with mobility/ambulation  - Relieve pressure over bony prominences  - Avoid friction and shearing  - Provide appropriate hygiene as needed including keeping skin clean and dry  - Evaluate need for skin moisturizer/barrier cream  - Collaborate with interdisciplinary team (i e  Nutrition, Rehabilitation, etc )   - Patient/family teaching  Outcome: Progressing     Problem: MUSCULOSKELETAL - ADULT  Goal: Maintain or return mobility to safest level of function  Description: INTERVENTIONS:  - Assess patient's ability to carry out ADLs; assess patient's baseline for ADL function and identify physical deficits which impact ability to perform ADLs (bathing, care of mouth/teeth, toileting, grooming, dressing, etc )  - Assess/evaluate cause of self-care deficits   - Assess range of motion  - Assess patient's mobility  - Assess patient's need for assistive devices and provide as appropriate  - Encourage maximum independence but intervene and supervise when necessary  - Involve family in performance of ADLs  - Assess for home care needs following discharge   - Consider OT consult to assist with ADL evaluation and planning for discharge  - Provide patient education as appropriate  Outcome: Progressing

## 2021-03-26 NOTE — PLAN OF CARE
Problem: PHYSICAL THERAPY ADULT  Goal: Performs mobility at highest level of function for planned discharge setting  See evaluation for individualized goals  Description: Treatment/Interventions: Functional transfer training, LE strengthening/ROM, Therapeutic exercise, Endurance training, Patient/family training, Equipment eval/education, Bed mobility, Gait training  Equipment Recommended: (Rollator)       See flowsheet documentation for full assessment, interventions and recommendations  Note: Prognosis: Fair  Problem List: Decreased strength, Decreased endurance, Impaired balance, Decreased mobility, Pain  Assessment: Jayme Reyes is a 80 y o  Female who [facility] on 3/25/2021 from home w/ c/o B LE swelling w/ knee pain and difficulty ambulating and diagnosis of B LE pain  Orders for PT eval and treat received, w/ activity orders of up w/ A and fall precautions  Pt presents w/ comorbidities of primary osteoarthritis, polyneuropathy, HTN, glaucoma,  and personal factors including: advanced age, mobilizing w/ assistive device, limited home support, anxiety and inability to perform IADLs  At baseline, pt mobilizes independently w/ her rollator, and reports 0 falls in the last 6 months  Upon evaluation, pt presents w/ the following deficits: weakness, altered sensation, edema of extremities, impaired balance, decreased endurance and pain limiting functional mobility  Pt requires  Min A x 1 for transfers, and CGA --> CS for gait w/ rollator for 120 ft  Pt's clinical presentation is unstable/unpredictable due to need for assist w/ all phases of mobility when usually mobilizing independently, pain impacting overall mobility status and need for input for mobility technique  Pt is at an increased risk of falls due to physical deficits  Given the above findings, discharge recommendation is for pt to return to [facility] w/ HHPT and social support   During this admission, pt would benefit from skilled acute inpatient PT in order to address the abovementioned deficits to maximize function and mobility before DC from acute care  PT Discharge Recommendation: Home with skilled therapy, Other (Comment)(HHPT @ [place])          See flowsheet documentation for full assessment

## 2021-03-26 NOTE — PROGRESS NOTES
[facility]  Progress Note - Yusra Man 8/25/1930, 80 y o  female MRN: [id number]  Unit/Bed#: S -01 Encounter: [id number]  Primary Care Provider: Enrico Iglesias MD   Date and time admitted to hospital: 3/25/2021 12:45 PM    * Bilateral lower extremity pain  Assessment & Plan  · POA, presents with 2 weeks history progressively worsening bilateral lower extremity pain and swelling initially below the knees and now radiating up to the thighs  · Knee x-ray with fibular head and neck fracture  Plan as below  · Also has history of polyneuropathy and is on gabapentin 300 mg t i d   · Bilateral lower extremity venous duplex studies obtained in the ED without evidence for DVT or superficial thrombophlebitis  · Suspect symptoms likely musculoskeletal  X ray knee with arthritic changes on my read, awaiting official read  · Scheduled Tylenol for pain control, Voltaren gel  · Compression stockings for extremity swelling  · Echo:  Pending  · PT/OT evaluations recs for home therapy    Nondisplaced fracture of right fibula  Assessment & Plan  · As noted on the x-ray today  · Orthopedic consultation  · Gravity syndesmosis x-ray ordered  · Weight-bearing as tolerated with Cam boot    Anxiety  Assessment & Plan  · Continue Xanax at home dose    Polyneuropathy  Assessment & Plan  · Follows with Rheumatology at [facility]  · Continue gabapentin    Glaucoma  Assessment & Plan  · Continue eyedrops    Essential hypertension  Assessment & Plan  · Blood pressure elevated to the 405M systolic in the ER, likely related to pain  · Continue home dose lisinopril, propranolol, control pain  · Now improved  Ambulatory dysfunction  Assessment & Plan  · POA, secondary to bilateral lower extremity pain  · Patient with progressively worsening ambulation and difficulty with ADLs  · PT/OT recs for home therapy      VTE Pharmacologic Prophylaxis:   Pharmacologic: Enoxaparin (Lovenox)  Mechanical VTE Prophylaxis in Place: Yes    Patient Centered Rounds: I have performed bedside rounds with nursing staff today  Discussions with Specialists or Other Care Team Provider: CM, nursing, ortho    Education and Discussions with Family / Patient: patient at bedside, called [facility]    Time Spent for Care: 30 minutes  More than 50% of total time spent on counseling and coordination of care as described above  Current Length of Stay: 0 day(s)    Current Patient Status: Observation   Certification Statement: The patient, admitted on an observation basis, will now require > 2 midnight hospital stay due to Fibula fracture, awaiting further orthopedic workup  PT/OT eval    Discharge Plan: hopefully tomorrow    Code Status: Level 3 - DNAR and DNI      Subjective:   Patient is still reporting pain in her right knee, was able to walk with physical therapy but this was painful for her  No fevers, chills  No erythema noted  Reports her leg swelling is better with elevation  No nausea, vomiting, chest pain, shortness of breath  No other events per nursing  Objective:     Vitals:   Temp (24hrs), Av 1 °F (36 7 °C), Min:97 8 °F (36 6 °C), Max:98 2 °F (36 8 °C)    Temp:  [97 8 °F (36 6 °C)-98 2 °F (36 8 °C)] 97 8 °F (36 6 °C)  HR:  [71-76] 71  Resp:  [16-22] 22  BP: (157-187)/(73-88) 157/81  SpO2:  [91 %-98 %] 92 %  Body mass index is 29 29 kg/m²  Input and Output Summary (last 24 hours): Intake/Output Summary (Last 24 hours) at 3/26/2021 1321  Last data filed at 3/26/2021 0501  Gross per 24 hour   Intake 140 ml   Output 900 ml   Net -760 ml       Physical Exam:     Physical Exam  Vitals signs and nursing note reviewed  Constitutional:       General: She is not in acute distress  Appearance: Normal appearance  HENT:      Head: Normocephalic  Mouth/Throat:      Mouth: Mucous membranes are moist    Eyes:      General: No scleral icterus  Pupils: Pupils are equal, round, and reactive to light  Cardiovascular:      Rate and Rhythm: Normal rate and regular rhythm  Heart sounds: No murmur  Pulmonary:      Effort: Pulmonary effort is normal  No respiratory distress  Breath sounds: Normal breath sounds  No wheezing, rhonchi or rales  Abdominal:      General: Bowel sounds are normal  There is no distension  Palpations: Abdomen is soft  Tenderness: There is no abdominal tenderness  Musculoskeletal:         General: Tenderness (right knee tenderness over lateral joint) and deformity (Arthritic changes to bilateral hands as well as knees ) present  No swelling  Right lower leg: Edema (non-pitting) present  Left lower leg: Edema (non-pitting) present  Skin:     Capillary Refill: Capillary refill takes less than 2 seconds  Neurological:      General: No focal deficit present  Mental Status: She is alert and oriented to person, place, and time  Mental status is at baseline  Additional Data:     Labs:    Results from last 7 days   Lab Units 03/25/21  1221   WBC Thousand/uL 6 37   HEMOGLOBIN g/dL 12 3   HEMATOCRIT % 37 5   PLATELETS Thousands/uL 258   NEUTROS PCT % 66   LYMPHS PCT % 19   MONOS PCT % 10   EOS PCT % 4     Results from last 7 days   Lab Units 03/25/21  1221   SODIUM mmol/L 141   POTASSIUM mmol/L 4 4   CHLORIDE mmol/L 105   CO2 mmol/L 28   BUN mg/dL 23   CREATININE mg/dL 0 76   ANION GAP mmol/L 8   CALCIUM mg/dL 8 7   ALBUMIN g/dL 3 4*   TOTAL BILIRUBIN mg/dL 0 33   ALK PHOS U/L 98   ALT U/L 19   AST U/L 16   GLUCOSE RANDOM mg/dL 101     Results from last 7 days   Lab Units 03/25/21  1402   INR  1 00                       * I Have Reviewed All Lab Data Listed Above  * Additional Pertinent Lab Tests Reviewed:  All Labs Within Last 24 Hours Reviewed    Imaging:    Imaging Reports Reviewed Today Include: X ray R knee  Imaging Personally Reviewed by Myself Includes:  X ray R knee    Recent Cultures (last 7 days):           Last 24 Hours Medication List: Current Facility-Administered Medications   Medication Dose Route Frequency Provider Last Rate    acetaminophen  975 mg Oral Q8H [facility] Fer Foot, MD      ALPRAZolam  0 25 mg Oral BID PRN Fer Foot, MD      calcium carbonate  500 mg Oral Daily Fer Foot, MD      cycloSPORINE  1 drop Both Eyes BID Fer Foot, MD      Diclofenac Sodium  2 g Topical 4x Daily Fer Foot, MD      dorzolamide-timolol  1 drop Both Eyes BID Fer Foot, MD      enoxaparin  40 mg Subcutaneous Daily Fer Foot, MD      gabapentin  300 mg Oral TID Fer Foot, MD      HYDROmorphone  0 2 mg Intravenous Q4H PRN Fer Foot, MD      latanoprost  1 drop Both Eyes HS Fer Foot, MD      levothyroxine  25 mcg Oral Early Morning Fer Foot, MD      lidocaine  1 patch Topical Daily Marguarite LINDA Lara      lisinopril  10 mg Oral Daily Fer Foot, MD      loratadine  10 mg Oral Daily Fer Foot, MD      melatonin  10 5 mg Oral HS Fer Foot, MD      naloxone  0 04 mg Intravenous Q1MIN PRN Fer Foot, MD      ondansetron  4 mg Intravenous Q4H PRN Fer Foot, MD      oxyCODONE  2 5 mg Oral Q4H PRN Fer Foot, MD      oxyCODONE  5 mg Oral Q4H PRN Fer Foot, MD      pantoprazole  40 mg Oral Early Morning Fer Foot, MD      propranolol  10 mg Oral TID Fer Foot, MD      senna-docusate sodium  1 tablet Oral BID Fer Foot, MD      sertraline  25 mg Oral Daily Fer Foot, MD          Today, Patient Was Seen By: Gary Camacho PA-C    ** Please Note: Dictation voice to text software may have been used in the creation of this document   **

## 2021-03-26 NOTE — ASSESSMENT & PLAN NOTE
· Blood pressure elevated to the 311V systolic in the ER, likely related to pain  · Continue home dose lisinopril, propranolol, control pain  · Now improved

## 2021-03-27 VITALS
HEART RATE: 80 BPM | OXYGEN SATURATION: 92 % | SYSTOLIC BLOOD PRESSURE: 136 MMHG | BODY MASS INDEX: 29.13 KG/M2 | TEMPERATURE: 98.3 F | DIASTOLIC BLOOD PRESSURE: 68 MMHG | HEIGHT: 64 IN | RESPIRATION RATE: 18 BRPM | WEIGHT: 170.64 LBS

## 2021-03-27 PROCEDURE — 97166 OT EVAL MOD COMPLEX 45 MIN: CPT

## 2021-03-27 PROCEDURE — 99239 HOSP IP/OBS DSCHRG MGMT >30: CPT | Performed by: PHYSICIAN ASSISTANT

## 2021-03-27 PROCEDURE — 97760 ORTHOTIC MGMT&TRAING 1ST ENC: CPT

## 2021-03-27 PROCEDURE — 97116 GAIT TRAINING THERAPY: CPT

## 2021-03-27 RX ORDER — ACETAMINOPHEN 325 MG/1
975 TABLET ORAL EVERY 8 HOURS SCHEDULED
Refills: 0
Start: 2021-03-27

## 2021-03-27 RX ADMIN — CALCIUM 500 MG: 500 TABLET ORAL at 08:05

## 2021-03-27 RX ADMIN — SERTRALINE HYDROCHLORIDE 25 MG: 25 TABLET ORAL at 08:05

## 2021-03-27 RX ADMIN — DICLOFENAC SODIUM 2 G: 10 GEL TOPICAL at 11:33

## 2021-03-27 RX ADMIN — LEVOTHYROXINE SODIUM 25 MCG: 25 TABLET ORAL at 05:26

## 2021-03-27 RX ADMIN — PROPRANOLOL HYDROCHLORIDE 10 MG: 20 TABLET ORAL at 16:25

## 2021-03-27 RX ADMIN — DORZOLAMIDE HYDROCHLORIDE AND TIMOLOL MALEATE 1 DROP: 22.3; 6.8 SOLUTION/ DROPS OPHTHALMIC at 08:06

## 2021-03-27 RX ADMIN — ACETAMINOPHEN 975 MG: 325 TABLET, FILM COATED ORAL at 15:09

## 2021-03-27 RX ADMIN — LISINOPRIL 10 MG: 10 TABLET ORAL at 08:05

## 2021-03-27 RX ADMIN — ACETAMINOPHEN 975 MG: 325 TABLET, FILM COATED ORAL at 05:26

## 2021-03-27 RX ADMIN — DOCUSATE SODIUM AND SENNOSIDES 1 TABLET: 8.6; 5 TABLET, FILM COATED ORAL at 08:06

## 2021-03-27 RX ADMIN — DICLOFENAC SODIUM 2 G: 10 GEL TOPICAL at 08:07

## 2021-03-27 RX ADMIN — LIDOCAINE 5% 1 PATCH: 700 PATCH TOPICAL at 08:06

## 2021-03-27 RX ADMIN — PROPRANOLOL HYDROCHLORIDE 10 MG: 20 TABLET ORAL at 08:06

## 2021-03-27 RX ADMIN — GABAPENTIN 300 MG: 300 CAPSULE ORAL at 08:05

## 2021-03-27 RX ADMIN — GABAPENTIN 300 MG: 300 CAPSULE ORAL at 16:25

## 2021-03-27 RX ADMIN — PANTOPRAZOLE SODIUM 40 MG: 40 TABLET, DELAYED RELEASE ORAL at 05:26

## 2021-03-27 RX ADMIN — LORATADINE 10 MG: 10 TABLET ORAL at 08:05

## 2021-03-27 NOTE — UTILIZATION REVIEW
Continued Stay Review  Date: 3/27/2021                       Current Patient Class: INPATIENT    Current Level of Care: MS  HPI:89 y o  female initially admitted OBSERVATION ADMISSION 3/25/2021 1536 CONVERTED TO INPATIENT ADMISSION 3/26/2021 1321 DUE TO WORSENING BILAT LE PAIN W/ WORSENING AMBULATION W FAILED OUTPATIENT TREATMENT -XR REVEALING RIGHT PROX FIBULA HEAD/ NECK FRACTURE REQ CONSULTS FOR MEDICAL MANAGEMENT  Assessment/Plan:   3/27 Physical Therapy  eval WITH ROLLATOR rollerwalker & CAM BOOT FOR AMBULATION:   Decreased strength;Decreased endurance; Impaired balance;Decreased mobility;Orthopedic restrictions  Communication with ortho via tigertext recommending continued use of [place] for ambulation or weight bearing until follow up appointment with ortho  Pt was fit with a medium [place]  Good fit noted with no areas of significant discomfort  3/27 Provider  2 week Bilateral Lower extremity pain w swellig initially below the knees now radiating up to thighs  Knee x-ray with fibular head and neck fracture  Nondisplaced fx of right fibula: Orthopedic consultation appreciated  Gravity syndesmosis x-ray with no ankle instability  Weight-bearing as tolerated with Cam boot    Follow-up in around 2 weeks with Sports Medicine    Pertinent Labs/Diagnostic Results:       Results from last 7 days   Lab Units 03/25/21  1221   WBC Thousand/uL 6 37   HEMOGLOBIN g/dL 12 3   HEMATOCRIT % 37 5   PLATELETS Thousands/uL 258   NEUTROS ABS Thousands/µL 4 22         Results from last 7 days   Lab Units 03/25/21  1221   SODIUM mmol/L 141   POTASSIUM mmol/L 4 4   CHLORIDE mmol/L 105   CO2 mmol/L 28   ANION GAP mmol/L 8   BUN mg/dL 23   CREATININE mg/dL 0 76   EGFR ml/min/1 73sq m 69   CALCIUM mg/dL 8 7     Results from last 7 days   Lab Units 03/25/21  1221   AST U/L 16   ALT U/L 19   ALK PHOS U/L 98   TOTAL PROTEIN g/dL 6 8   ALBUMIN g/dL 3 4*   TOTAL BILIRUBIN mg/dL 0 33         Results from last 7 days   Lab Units 03/25/21  1221   GLUCOSE RANDOM mg/dL 101             No results found for: BETA-HYDROXYBUTYRATE                   Results from last 7 days   Lab Units 03/25/21  1221   TROPONIN I ng/mL <0 02         Results from last 7 days   Lab Units 03/25/21  1402   PROTIME seconds 13 3   INR  1 00   PTT seconds 33                         Results from last 7 days   Lab Units 03/25/21  1221   NT-PRO BNP pg/mL 510*             Vital Signs:   Date/Time  Temp  Pulse  Resp  BP  MAP (mmHg)  SpO2  O2 Device  Patient Position - Orthostatic VS   03/27/21 0733  97 7 °F (36 5 °C)  80  18  129/85  --  92 %  None (Room air)  Sitting   03/26/21 2131  97 8 °F (36 6 °C)  77  18  138/72  99  92 %  None (Room air)  --   03/26/21 1500  98 6 °F (37 °C)  76  20  161/77  110  91 %  None (Room air)  Lying         Medications:   Scheduled Medications:  acetaminophen, 975 mg, Oral, Q8H [facility]  calcium carbonate, 500 mg, Oral, Daily  cycloSPORINE, 1 drop, Both Eyes, BID  Diclofenac Sodium, 2 g, Topical, 4x Daily  dorzolamide-timolol, 1 drop, Both Eyes, BID  enoxaparin, 40 mg, Subcutaneous, Daily  gabapentin, 300 mg, Oral, TID  latanoprost, 1 drop, Both Eyes, HS  levothyroxine, 25 mcg, Oral, Early Morning  lidocaine, 1 patch, Topical, Daily  lisinopril, 10 mg, Oral, Daily  loratadine, 10 mg, Oral, Daily  melatonin, 10 5 mg, Oral, HS  pantoprazole, 40 mg, Oral, Early Morning  propranolol, 10 mg, Oral, TID  senna-docusate sodium, 1 tablet, Oral, BID  sertraline, 25 mg, Oral, Daily  Continuous IV Infusions:     PRN Meds:  ALPRAZolam, 0 25 mg, Oral, BID PRN  HYDROmorphone, 0 2 mg, Intravenous, Q4H PRN  naloxone, 0 04 mg, Intravenous, Q1MIN PRN  ondansetron, 4 mg, Intravenous, Q4H PRN  oxyCODONE, 2 5 mg, Oral, Q4H PRN  oxyCODONE, 5 mg, Oral, Q4H PRN      Discharge Plan: dc 3/27    Network Utilization Review Department  ATTENTION: Please call with any questions or concerns to [phone] and carefully listen to the prompts so that you are directed to the right person  All voicemails are confidential   Michael Gordon all requests for admission clinical reviews, approved or denied determinations and any other requests to dedicated fax number below belonging to the campus where the patient is receiving treatment   List of dedicated fax numbers for the Facilities:  [address] DENIALS (Administrative/Medical Necessity) [phone]   [address] (Maternity/NICU/Pediatrics) [phone] [address] [address] [address]efer[address] Arizmendi [address] (  [place] "[address]) [address] [address] [place] [address] Dileep Guerrero [address] [place] [address] [phone]

## 2021-03-27 NOTE — ASSESSMENT & PLAN NOTE
· POA, secondary to bilateral lower extremity pain and fibula fx  · Patient with progressively worsening ambulation and difficulty with ADLs  · PT/OT recs for home therapy

## 2021-03-27 NOTE — PLAN OF CARE
Problem: PHYSICAL THERAPY ADULT  Goal: Performs mobility at highest level of function for planned discharge setting  See evaluation for individualized goals  Description: Treatment/Interventions: Functional transfer training, LE strengthening/ROM, Therapeutic exercise, Endurance training, Patient/family training, Equipment eval/education, Bed mobility, Gait training  Equipment Recommended: (Rollator)       See flowsheet documentation for full assessment, interventions and recommendations  Outcome: Progressing  Note: Prognosis: Fair  Problem List: Decreased strength, Decreased endurance, Impaired balance, Decreased mobility, Orthopedic restrictions  Assessment: Pt agrees to PT treatment and is cooperative throughout session  Reports the Camboot as feeling heavy and difficult to ambulate with  Pt is able to ambulate ~120` with rollator and Camboot on RLE with close supervision  Occasionally requires CGA for steadying support during turns/transitions during ambulation  Decreased foot clearance noted on RLE due to weight of Camboot  Decreased safety awareness noted during with use of rollator and requires verbal cues for using hand brakes  Will continue to benefit from ongoing skilled PT to maximize her functional mobility and increase her level of independence  Anticipating pt will be able to return to independent living with increased support from staff and HHPT at time of discharge  PT Discharge Recommendation: Other (Comment)(HHPT and increased support from staff)          See flowsheet documentation for full assessment

## 2021-03-27 NOTE — PHYSICAL THERAPY NOTE
PHYSICAL THERAPY TREATMENT NOTE    Patient Name: Desmond Fuchs  OKDQP'S Date: 3/27/2021     03/27/21 7292   PT Last Visit   PT Visit Date 21   Note Type   Note Type Treatment   Pain Assessment   Pain Assessment Tool Pain Assessment not indicated - pt denies pain   Pain Score No Pain   Restrictions/Precautions   Weight Bearing Precautions Per Order Yes   RLE Weight Bearing Per Order WBAT   Other Precautions Chair Alarm; Bed Alarm; Fall Risk   General   Chart Reviewed Yes   Additional Pertinent History order placed for Camboot RLE and WBAT   Response to Previous Treatment Patient with no complaints from previous session  Family/Caregiver Present No   Cognition   Overall Cognitive Status WFL   Arousal/Participation Cooperative   Attention Attends with cues to redirect   Comments Pt identified by name and   Subjective   Subjective Agrees to PT treatment  "This boot is heavy "   Bed Mobility   Supine to Sit Unable to assess  (OOB in chair pre/post session with alarm intact)   Transfers   Sit to Stand 5  Supervision   Additional items Increased time required;Verbal cues   Stand to Sit 4  Minimal assistance   Additional items Assist x 1; Increased time required;Verbal cues  (for hand placement and brakes on rollator)   Ambulation/Elevation   Gait pattern Antalgic;Decreased R stance; Improper Weight shift;Decreased foot clearance;Shuffling; Short stride; Excessively slow   Gait Assistance 5  Supervision  (close supervision with occasional CGA for steadying support)   Additional items Verbal cues; Assist x 1   Assistive Device Other (Comment)  (rollator)   Distance 120` x1 and 20` x1   Curbs Small "trip" over threshold for bathroom due to decreased foot clearance with Camboot     Balance   Static Sitting Fair +   Dynamic Sitting Fair   Static Standing Fair   Dynamic Standing Fair   Ambulatory Fair -   Endurance Deficit   Endurance Deficit Yes   Endurance Deficit Description limited ambulation distance, fatigue Activity Tolerance   Activity Tolerance Patient tolerated treatment well;Patient limited by fatigue   Nurse Made Aware Per RN, pt appropriate to treat   Assessment   Prognosis Fair   Problem List Decreased strength;Decreased endurance; Impaired balance;Decreased mobility;Orthopedic restrictions   Assessment Pt agrees to PT treatment and is cooperative throughout session  Reports the Camboot as feeling heavy and difficult to ambulate with  Pt is able to ambulate ~120` with rollator and Camboot on RLE with close supervision  Occasionally requires CGA for steadying support during turns/transitions during ambulation  Decreased foot clearance noted on RLE due to weight of Camboot  Decreased safety awareness noted during with use of rollator and requires verbal cues for using hand brakes  Will continue to benefit from ongoing skilled PT to maximize her functional mobility and increase her level of independence  Anticipating pt will be able to return to independent living with increased support from staff and HHPT at time of discharge  Goals   Patient Goals for my knee to get better   STG Expiration Date 04/05/21   PT Treatment Day 2   Plan   Treatment/Interventions Functional transfer training;LE strengthening/ROM; Therapeutic exercise; Endurance training;Patient/family training;Equipment eval/education; Bed mobility;Gait training   Progress Slow progress, decreased activity tolerance   PT Frequency Other (Comment)  (3-5x/week)   Recommendation   PT Discharge Recommendation Other (Comment)  (HHPT and increased support from staff)   Equipment Recommended   (rollator)   [place] 8 in Bed Without Bedrails 4   Lying on Back to Sitting on Edge of Flat Bed 3   Moving Bed to Chair 3   Standing Up From Chair 3   Walk in Room 3   Climb 3-5 Stairs 3   Basic Mobility Inpatient Raw Score 19   Basic Mobility Standardized Score 42 48   Shanda Schroeder, PT,DPT    Orthotic note:    Time in: 0920  Time out: 0931  Total time: 11 minutes     Orders noted for Camboot for RLE  Communication with ortho via tigertext recommending continued use of Camboot for ambulation or weight bearing until follow up appointment with ortho  Pt was fit with a medium Camboot  Good fit noted with no areas of significant discomfort  Education provided on use and donning/doffing  Will continue to follow and treat during PT sessions      Asia Goff, PT,DPT

## 2021-03-27 NOTE — NURSING NOTE
Pt discharged back to independent living ([facility])  Reviewed all the discharge instructions with the pt and grand daughter Lui Rai)  Gathered all the belongings  Pt acknowledged all the discharge instructions  Pt wheeled downstairs in a wheel chair by PCA  Pt left the floor in a stable condition

## 2021-03-27 NOTE — ASSESSMENT & PLAN NOTE
· Follows with Rheumatology at [facility]  · Continue gabapentin

## 2021-03-27 NOTE — PLAN OF CARE
Problem: Potential for Falls  Goal: Patient will remain free of falls  Description: INTERVENTIONS:  - Assess patient frequently for physical needs  -  Identify cognitive and physical deficits and behaviors that affect risk of falls    -  [place] fall precautions as indicated by assessment   - Educate patient/family on patient safety including physical limitations  - Instruct patient to call for assistance with activity based on assessment  - Modify environment to reduce risk of injury  - Consider OT/PT consult to assist with strengthening/mobility  Outcome: Progressing     Problem: PAIN - ADULT  Goal: Verbalizes/displays adequate comfort level or baseline comfort level  Description: Interventions:  - Encourage patient to monitor pain and request assistance  - Assess pain using appropriate pain scale  - Administer analgesics based on type and severity of pain and evaluate response  - Implement non-pharmacological measures as appropriate and evaluate response  - Consider cultural and social influences on pain and pain management  - Notify physician/advanced practitioner if interventions unsuccessful or patient reports new pain  Outcome: Progressing     Problem: INFECTION - ADULT  Goal: Absence or prevention of progression during hospitalization  Description: INTERVENTIONS:  - Assess and monitor for signs and symptoms of infection  - Monitor lab/diagnostic results  - Monitor all insertion sites, i e  indwelling lines, tubes, and drains  - Monitor endotracheal if appropriate and nasal secretions for changes in amount and color  - [place] appropriate cooling/warming therapies per order  - Administer medications as ordered  - Instruct and encourage patient and family to use good hand hygiene technique  - Identify and instruct in appropriate isolation precautions for identified infection/condition  Outcome: Progressing  Goal: Absence of fever/infection during neutropenic period  Description: INTERVENTIONS:  - Monitor WBC    Outcome: Progressing     Problem: SAFETY ADULT  Goal: Patient will remain free of falls  Description: INTERVENTIONS:  - Assess patient frequently for physical needs  -  Identify cognitive and physical deficits and behaviors that affect risk of falls    -  [place] fall precautions as indicated by assessment   - Educate patient/family on patient safety including physical limitations  - Instruct patient to call for assistance with activity based on assessment  - Modify environment to reduce risk of injury  - Consider OT/PT consult to assist with strengthening/mobility  Outcome: Progressing  Goal: Maintain or return to baseline ADL function  Description: INTERVENTIONS:  -  Assess patient's ability to carry out ADLs; assess patient's baseline for ADL function and identify physical deficits which impact ability to perform ADLs (bathing, care of mouth/teeth, toileting, grooming, dressing, etc )  - Assess/evaluate cause of self-care deficits   - Assess range of motion  - Assess patient's mobility; develop plan if impaired  - Assess patient's need for assistive devices and provide as appropriate  - Encourage maximum independence but intervene and supervise when necessary  - Involve family in performance of ADLs  - Assess for home care needs following discharge   - Consider OT consult to assist with ADL evaluation and planning for discharge  - Provide patient education as appropriate  Outcome: Progressing  Goal: Maintain or return mobility status to optimal level  Description: INTERVENTIONS:  - Assess patient's baseline mobility status (ambulation, transfers, stairs, etc )    - Identify cognitive and physical deficits and behaviors that affect mobility  - Identify mobility aids required to assist with transfers and/or ambulation (gait belt, sit-to-stand, lift, walker, cane, etc )  - [place] fall precautions as indicated by assessment  - Record patient progress and toleration of activity level on Mobility SBAR; progress patient to next Phase/Stage  - Instruct patient to call for assistance with activity based on assessment  - Consider rehabilitation consult to assist with strengthening/weightbearing, etc   Outcome: Progressing     Problem: DISCHARGE PLANNING  Goal: Discharge to home or other facility with appropriate resources  Description: INTERVENTIONS:  - Identify barriers to discharge w/patient and caregiver  - Arrange for needed discharge resources and transportation as appropriate  - Identify discharge learning needs (meds, wound care, etc )  - Arrange for interpretive services to assist at discharge as needed  - Refer to Case Management Department for coordinating discharge planning if the patient needs post-hospital services based on physician/advanced practitioner order or complex needs related to functional status, cognitive ability, or social support system  Outcome: Progressing     Problem: Knowledge Deficit  Goal: Patient/family/caregiver demonstrates understanding of disease process, treatment plan, medications, and discharge instructions  Description: Complete learning assessment and assess knowledge base  Interventions:  - Provide teaching at level of understanding  - Provide teaching via preferred learning methods  Outcome: Progressing     Problem: NEUROSENSORY - ADULT  Goal: Achieves maximal functionality and self care  Description: INTERVENTIONS  - Monitor swallowing and airway patency with patient fatigue and changes in neurological status  - Encourage and assist patient to increase activity and self care     - Encourage visually impaired, hearing impaired and aphasic patients to use assistive/communication devices  Outcome: Progressing     Problem: RESPIRATORY - ADULT  Goal: Achieves optimal ventilation and oxygenation  Description: INTERVENTIONS:  - Assess for changes in respiratory status  - Assess for changes in mentation and behavior  - Position to facilitate oxygenation and minimize respiratory effort  - Oxygen administered by appropriate delivery if ordered  - Initiate smoking cessation education as indicated  - Encourage broncho-pulmonary hygiene including cough, deep breathe, Incentive Spirometry  - Assess the need for suctioning and aspirate as needed  - Assess and instruct to report SOB or any respiratory difficulty  - Respiratory Therapy support as indicated  Outcome: Progressing     Problem: GASTROINTESTINAL - ADULT  Goal: Maintains adequate nutritional intake  Description: INTERVENTIONS:  - Monitor percentage of each meal consumed  - Identify factors contributing to decreased intake, treat as appropriate  - Assist with meals as needed  - Monitor I&O, weight, and lab values if indicated  - Obtain nutrition services referral as needed  Outcome: Progressing     Problem: GENITOURINARY - ADULT  Goal: Absence of urinary retention  Description: INTERVENTIONS:  - Assess patients ability to void and empty bladder  - Monitor I/O  - Bladder scan as needed  - Discuss with physician/AP medications to alleviate retention as needed  - Discuss catheterization for long term situations as appropriate  Outcome: Progressing     Problem: METABOLIC, FLUID AND ELECTROLYTES - ADULT  Goal: Electrolytes maintained within normal limits  Description: INTERVENTIONS:  - Monitor labs and assess patient for signs and symptoms of electrolyte imbalances  - Administer electrolyte replacement as ordered  - Monitor response to electrolyte replacements, including repeat lab results as appropriate  - Instruct patient on fluid and nutrition as appropriate  Outcome: Progressing  Goal: Fluid balance maintained  Description: INTERVENTIONS:  - Monitor labs   - Monitor I/O and WT  - Instruct patient on fluid and nutrition as appropriate  - Assess for signs & symptoms of volume excess or deficit  Outcome: Progressing     Problem: SKIN/TISSUE INTEGRITY - ADULT  Goal: Skin integrity remains intact  Description: INTERVENTIONS  - Identify patients at risk for skin breakdown  - Assess and monitor skin integrity  - Assess and monitor nutrition and hydration status  - Monitor labs (i e  albumin)  - Assess for incontinence   - Turn and reposition patient  - Assist with mobility/ambulation  - Relieve pressure over bony prominences  - Avoid friction and shearing  - Provide appropriate hygiene as needed including keeping skin clean and dry  - Evaluate need for skin moisturizer/barrier cream  - Collaborate with interdisciplinary team (i e  Nutrition, Rehabilitation, etc )   - Patient/family teaching  Outcome: Progressing     Problem: MUSCULOSKELETAL - ADULT  Goal: Maintain or return mobility to safest level of function  Description: INTERVENTIONS:  - Assess patient's ability to carry out ADLs; assess patient's baseline for ADL function and identify physical deficits which impact ability to perform ADLs (bathing, care of mouth/teeth, toileting, grooming, dressing, etc )  - Assess/evaluate cause of self-care deficits   - Assess range of motion  - Assess patient's mobility  - Assess patient's need for assistive devices and provide as appropriate  - Encourage maximum independence but intervene and supervise when necessary  - Involve family in performance of ADLs  - Assess for home care needs following discharge   - Consider OT consult to assist with ADL evaluation and planning for discharge  - Provide patient education as appropriate  Outcome: Progressing     Problem: Prexisting or High Potential for Compromised Skin Integrity  Goal: Skin integrity is maintained or improved  Description: INTERVENTIONS:  - Identify patients at risk for skin breakdown  - Assess and monitor skin integrity  - Assess and monitor nutrition and hydration status  - Monitor labs   - Assess for incontinence   - Turn and reposition patient  - Assist with mobility/ambulation  - Relieve pressure over bony prominences  - Avoid friction and shearing  - Provide appropriate hygiene as needed including keeping skin clean and dry  - Evaluate need for skin moisturizer/barrier cream  - Collaborate with interdisciplinary team   - Patient/family teaching  - Consider wound care consult   Outcome: Progressing

## 2021-03-27 NOTE — ASSESSMENT & PLAN NOTE
· POA, presents with 2 weeks history progressively worsening bilateral lower extremity pain and swelling initially below the knees and now radiating up to the thighs  · Knee x-ray with fibular head and neck fracture  Plan as below  · Also has history of polyneuropathy and is on gabapentin 300 mg t i d   · Bilateral lower extremity venous duplex studies obtained in the ED without evidence for DVT or superficial thrombophlebitis  · Suspect symptoms likely musculoskeletal    · Scheduled Tylenol for pain control, Voltaren gel  · Compression stockings for extremity swelling    · Echo:  Preserved EF, G1DD  · PT/OT evaluations recs for home therapy

## 2021-03-27 NOTE — PLAN OF CARE
Problem: OCCUPATIONAL THERAPY ADULT  Goal: Performs self-care activities at highest level of function for planned discharge setting  See evaluation for individualized goals  Description: Treatment Interventions: ADL retraining, Endurance training, Functional transfer training, Patient/family training, Neuromuscular reeducation, Continued evaluation, Activityengagement, Energy conservation          See flowsheet documentation for full assessment, interventions and recommendations  3/27/2021 1028 by Elliott Carias OT  Note: Limitation: Decreased ADL status, Decreased UE strength, Decreased Safe judgement during ADL, Decreased self-care trans, Decreased high-level ADLs, Decreased endurance  Prognosis: Fair  Assessment: Patient evaluated by Occupational Therapy  Patient admitted with Bilateral lower extremity pain  Pt has Right nondisplaced fibular head/neck fracture The patients occupational profile, medical and therapy history includes a expanded review of medical and/or therapy records and additional review of physical, cognitive, or psychosocial history related to current functional performance  Comorbidities affecting functional mobility and ADLS include: arthritis and hypertension  Prior to admission, patient was independent with functional mobility with rollater, independent with ADLS and requiring assist for IADLS  Patient performed grooming and UB bath standing, LB dressing Sup, transfers at [address] and functional mobility at at CGA/ MIN A The evaluation identifies the following performance deficits: weakness, impaired balance, decreased endurance, decreased coordination, increased fall risk, new onset of impairment of functional mobility, decreased ADLS, decreased IADLS, decreased activity tolerance, decreased safety awareness and ortheopedic restrictions, that result in activity limitations and/or participation restrictions   This evaluation requires clinical decision making of moderate complexity, because the patient may present with comorbidities that affect occupational performance and required minimal or moderate modification of tasks or assistance with the consideration of several treatment options  The Barthel Index was used as a functional outcome tool presenting with a score of 60,The patient's raw score on the -PAC Daily Activity inpatient short form is 20, standardized score is 42 03, greater than 39 4  Patients at this level are likely to benefit from DC to home  Please refer to the recommendation of the Occupational Therapist for safe DC planning  Patient will benefit from skilled Occupational Therapy services to address above deficits and facilitate a safe return to prior level of function  OT Discharge Recommendation: Other (Comment)(Home skilled therapy with increase support at facility)       3/27/2021 1028 by Aman Rowe OT  Note: Limitation: Decreased ADL status, Decreased UE strength, Decreased Safe judgement during ADL, Decreased self-care trans, Decreased high-level ADLs, Decreased endurance  Prognosis: Fair  Assessment: Patient evaluated by Occupational Therapy  Patient admitted with Bilateral lower extremity pain  Pt has Right nondisplaced fibular head/neck fracture      OT Discharge Recommendation: Other (Comment)(Home skilled therapy with increase support at facility)

## 2021-03-27 NOTE — ASSESSMENT & PLAN NOTE
· Blood pressure elevated to the 371H systolic in the ER, likely related to pain  · Continue home dose lisinopril, propranolol, control pain  · Now improved

## 2021-03-27 NOTE — ASSESSMENT & PLAN NOTE
· As noted on x-ray  · Orthopedic consultation appreciated  · Gravity syndesmosis x-ray with no ankle instability  · Weight-bearing as tolerated with Cam boot  · Follow-up in around 2 weeks with Sports Medicine, this was discussed with the patient and her granddaughter

## 2021-03-27 NOTE — OCCUPATIONAL THERAPY NOTE
Occupational Therapy Evaluation     Patient Name: Josefina Acevedo  YPKON'F Date: 3/27/2021  Problem List  Principal Problem:    Bilateral lower extremity pain  Active Problems:    Ambulatory dysfunction    Essential hypertension    Glaucoma    Polyneuropathy    Anxiety    Nondisplaced fracture of right fibula    Past Medical History  Past Medical History:   Diagnosis Date    Arthritis     Hypertension      Past Surgical History  History reviewed  No pertinent surgical history  03/27/21 0959   OT Last Visit   OT Visit Date 03/27/21   Note Type   Note type Evaluation   Restrictions/Precautions   Weight Bearing Precautions Per Order Yes   RLE Weight Bearing Per Order WBAT   Braces or Orthoses CAM Boot   Pain Assessment   Pain Assessment Tool Pain Assessment not indicated - pt denies pain   Pain Score No Pain   Home Living   Bathroom Shower/Tub Walk-in shower   Bathroom Toilet Standard   Bathroom Equipment Grab bars in shower;Grab bars around toilet;Built-in shower seat   Home Equipment Other (Comment)  (rollater)   Additional Comments Pt lives in [address] living  facility 2nd floor with elevator  Patient states that she Indep with ADL and uses a rollater to ambulate  Pt has meal in i n dining [place] and facility provides assist with cleaning   Prior Function   Level of [place] Independent with ADLs and functional mobility   Lives With Alone   Receives Help From   (facility)   ADL Assistance Independent   IADLs Needs assistance   Falls in the last 6 months 1 to 4   Comments Pt stated one month fell of recliner   ADL   Eating Assistance 7  Independent   Grooming Assistance 5  Supervision/Setup   Grooming Deficit Wash/dry hands; Wash/dry face  (standing)   UB Bathing Assistance 5  Supervision/Setup   UB Bathing Deficit Right arm;Left arm   LB Bathing Assistance Unable to assess   UB Dressing Assistance 5  Supervision/Setup   LB Dressing Assistance 5  Supervision/Setup   LB Dressing Deficit Thread RLE into pants; Thread LLE into pants   Toileting Assistance  5  Supervision/Setup   Bed Mobility   Additional Comments Unable to assess due to received in recliner   Transfers   Sit to Stand 4  Minimal assistance   Additional items Assist x 1; Increased time required;Verbal cues   Stand to Sit 4  Minimal assistance   Additional items Assist x 1; Increased time required;Verbal cues   Functional Mobility   Functional Mobility   (CGA/ Min a)   Additional Comments Pt ambulated in room with rollater with safety cues   Balance   Static Sitting Fair +   Dynamic Sitting Fair +   Static Standing Fair   Dynamic Standing Fair -   Activity Tolerance   Activity Tolerance Patient tolerated treatment well   Nurse Made Aware RN [place]   RUE Assessment   RUE Assessment X  (ulnar deviation hands noted)   RUE Overall AROM   R Shoulder Flexion   (limited 90)   R Shoulder ABduction   (limited 90)   LUE Assessment   LUE Assessment   (Pt had ulnar deviation of hands)   LUE Overall AROM   L Shoulder Flexion   (limited 90)   L Shoulder ABduction   (limited 90)   Hand Function   Gross Motor Coordination Functional   Fine Motor Coordination Functional   Cognition   Orientation Level Oriented X4   Following Commands Follows one step commands with increased time or repetition   Comments Pt ID by wristband name and    Assessment   Limitation Decreased ADL status; Decreased UE strength;Decreased Safe judgement during ADL;Decreased self-care trans;Decreased high-level ADLs; Decreased endurance   Prognosis Fair   Assessment Patient evaluated by Occupational Therapy  Patient admitted with Bilateral lower extremity pain  Pt has Right nondisplaced fibular head/neck fracture The patients occupational profile, medical and therapy history includes a expanded review of medical and/or therapy records and additional review of physical, cognitive, or psychosocial history related to current functional performance    Comorbidities affecting functional mobility and ADLS include: arthritis and hypertension  Prior to admission, patient was independent with functional mobility with rollater, independent with ADLS and requiring assist for IADLS  Patient performed grooming and UB bath standing, LB dressing Sup, transfers at [address] and functional mobility at at CGA/ MIN A The evaluation identifies the following performance deficits: weakness, impaired balance, decreased endurance, decreased coordination, increased fall risk, new onset of impairment of functional mobility, decreased ADLS, decreased IADLS, decreased activity tolerance, decreased safety awareness and ortheopedic restrictions, that result in activity limitations and/or participation restrictions  This evaluation requires clinical decision making of moderate complexity, because the patient may present with comorbidities that affect occupational performance and required minimal or moderate modification of tasks or assistance with the consideration of several treatment options  The Barthel Index was used as a functional outcome tool presenting with a score of 60,The patient's raw score on the AM-PAC Daily Activity inpatient short form is 20, standardized score is 42 03, greater than 39 4  Patients at this level are likely to benefit from DC to home  Please refer to the recommendation of the Occupational Therapist for safe DC planning  Patient will benefit from skilled Occupational Therapy services to address above deficits and facilitate a safe return to prior level of function  Goals   Patient Goals " knee gets better"   STG Time Frame   (1-7)   Short Term Goal #1  Patient will increase standing tolerance to 6 minutes during ADL task to decrease assistance level and decrease fall risk; Patient will increase bed mobility to supervision in preparation for ADLS and transfers;  Patient will increase functional mobility to and from bathroom with rollater with supervision to increase performance with ADLS and to use a toilet; Patient will tolerate 7 minutes of UE ROM/strengthening to increase general activity tolerance and performance in ADLS/IADLS; Patient will improve functional activity tolerance to 7 minutes of sustained functional tasks to increase participation in basic self-care and decrease assistance level; Patient will increase dynamic standing balance to fair to improve postural stability and decrease fall risk during standing ADLS and transfers  LTG Time Frame   (8-14)   Long Term Goal #1 Patient will increase standing tolerance to 8 minutes during ADL task to decrease assistance level and decrease fall risk; Patient will increase bed mobility to mod I in preparation for ADLS and transfers; Patient will increase functional mobility to and from bathroom with rollater Mod I to increase performance with ADLS and to use a toilet; Patient will tolerate 10 minutes of UE ROM/strengthening to increase general activity tolerance and performance in ADLS/IADLS; Patient will improve functional activity tolerance to 10 minutes of sustained functional tasks to increase participation in basic self-care and decrease assistance level; Patient will increase dynamic standing balance to fair+ to improve postural stability and decrease fall risk during standing ADLS and transfers  Functional Transfer Goals   Pt Will Perform All Functional Transfers With mod indep   Pt Will Transfer To Bedside Commode With mod indep   Pt Will Transfer To Toilet With mod indep   Pt Will Transfer To Shower With mod indep   ADL Goals   Pt Will Perform Eating Independently   Pt Will Perform Grooming With mod indep   Pt Will Perform Bathing With mod indep   Pt Will Perform UE Dressing With mod indep   Pt Will Perform LE Dressing With mod indep   Pt Will Perform Toileting With mod indep   Plan   Treatment Interventions ADL retraining; Endurance training;Functional transfer training;Patient/family training;Neuromuscular reeducation;Continued evaluation; Activityengagement; Energy conservation   Goal Expiration Date 04/10/21   OT Frequency 2-3x/wk   Recommendation   OT Discharge Recommendation Other (Comment)  (Home skilled therapy with increase support at facility)   AM-PAC Daily Activity Inpatient   Lower Body Dressing 3   Bathing 3   Toileting 3   Upper Body Dressing 4   Grooming 3   Eating 4   Daily Activity Raw Score 20   Daily Activity Standardized Score (Calc for Raw Score >=11) 42 03   AM-PAC Applied Cognition Inpatient   Following a Speech/Presentation 4   Understanding Ordinary Conversation 4   Taking Medications 4   Remembering Where Things Are Placed or Put Away 3   Remembering List of 4-5 Errands 4   Taking Care of Complicated Tasks 3   Applied Cognition Raw Score 22   Applied Cognition Standardized Score 47 83   Barthel Index   Feeding 10   Bathing 5   Grooming Score 0   Dressing Score 5   Bladder Score 10   Bowels Score 10   Toilet Use Score 5   Transfers (Bed/Chair) Score 10   Mobility (Level Surface) Score 0   Stairs Score 5   Barthel Index Score 60

## 2021-03-27 NOTE — CASE MANAGEMENT
Pt will be returning to her independent living facility [facility]  Pt is recommending PT/OT at home (VNA services)  [facility] referral has been made for VNA services  [facility] is able to see pt and contact information has been added to AVS   Pt has been informed

## 2021-04-13 ENCOUNTER — OFFICE VISIT (OUTPATIENT)
Dept: OBGYN CLINIC | Facility: CLINIC | Age: 86
End: 2021-04-13
Payer: COMMERCIAL

## 2021-04-13 ENCOUNTER — APPOINTMENT (OUTPATIENT)
Dept: RADIOLOGY | Facility: AMBULARY SURGERY CENTER | Age: 86
End: 2021-04-13
Payer: COMMERCIAL

## 2021-04-13 VITALS
HEART RATE: 86 BPM | DIASTOLIC BLOOD PRESSURE: 94 MMHG | BODY MASS INDEX: 28.17 KG/M2 | HEIGHT: 64 IN | SYSTOLIC BLOOD PRESSURE: 178 MMHG | WEIGHT: 165 LBS

## 2021-04-13 DIAGNOSIS — S82.831A OTHER CLOSED FRACTURE OF PROXIMAL END OF RIGHT FIBULA, INITIAL ENCOUNTER: ICD-10-CM

## 2021-04-13 DIAGNOSIS — S82.831A OTHER CLOSED FRACTURE OF PROXIMAL END OF RIGHT FIBULA, INITIAL ENCOUNTER: Primary | ICD-10-CM

## 2021-04-13 PROCEDURE — 99204 OFFICE O/P NEW MOD 45 MIN: CPT | Performed by: PHYSICAL MEDICINE & REHABILITATION

## 2021-04-13 PROCEDURE — 73562 X-RAY EXAM OF KNEE 3: CPT

## 2021-04-13 NOTE — PROGRESS NOTES
1  Other closed fracture of proximal end of right fibula, initial encounter  XR knee 3 vw right non injury     Orders Placed This Encounter   Procedures    XR knee 3 vw right non injury        Impression:  Right proximal leg pain secondary to nondisplaced proximal fibula fracture through the fibular head/neck region with date of injury as possibly this past January 2021  We repeated x-rays as below  Patient is doing well  She should slowly wean out of the CAM boot  She should start home physical therapy to work on strengthening and ambulation training  I will see her back in four weeks if needed  The patient's pertinent inpatient orthopedic consultation notes were reviewed  Imaging Studies (I personally reviewed images in PACS and report):  Right knee and right ankle gravity stress x-rays most recent to this encounter reviewed  These images show  Nondisplaced proximal fibula fracture to the fibular head/ neck region  There is moderate -severe tricompartmental osteoarthritis in the knee without a joint effusion  The ankle x-rays show no acute osseous abnormality or syndesmosis widening on gravity stress view  Slightly osteopenic appearing bones on these x-rays  Repeat x-rays with focus on the proximal fibula obtained on 4/13/2021: These images show no obvious fracture  No follow-ups on file  HPI:  Wilda Shah is a 80 y o  female  who presents for evaluation of   Chief Complaint   Patient presents with    Right Ankle - Pain       Onset/Mechanism: Patient had a few falls this past January off of her recliner  Location: Lateral leg  Radiation: Denies  Provocative: Walking without the boot  Severity: Not too bad but hard to walk with boot  Associated Symptoms: Trouble walking with the boot  Treatment so far: Walking boot  Review of Systems   Constitutional: Positive for activity change  Negative for fever  HENT: Negative for sore throat  Eyes: Negative for visual disturbance  Respiratory: Negative for shortness of breath  Cardiovascular: Negative for chest pain  Gastrointestinal: Negative for abdominal pain  Endocrine: Negative for polydipsia  Genitourinary: Negative for difficulty urinating  Musculoskeletal: Positive for arthralgias and gait problem  Skin: Negative for rash  Allergic/Immunologic: Negative for immunocompromised state  Neurological: Negative for numbness  Hematological: Does not bruise/bleed easily  Psychiatric/Behavioral: Negative for confusion  Following history reviewed and updated:  Past Medical History:   Diagnosis Date    Arthritis     Hypertension      History reviewed  No pertinent surgical history  Social History   Social History     Substance and Sexual Activity   Alcohol Use None     Social History     Substance and Sexual Activity   Drug Use Not on file     Social History     Tobacco Use   Smoking Status Never Smoker   Smokeless Tobacco Never Used     History reviewed  No pertinent family history  Allergies   Allergen Reactions    Iodinated Diagnostic Agents Hives    Iodine - Food Allergy Other (See Comments)     shellfish and test dye    Nsaids GI Intolerance    Shellfish-Derived Products - Food Allergy Hives        Constitutional:  BP (!) 178/94   Pulse 86   Ht 5' 4" (1 626 m)   Wt 74 8 kg (165 lb)   BMI 28 32 kg/m²    General: NAD  Eyes: Anicteric sclerae  Neck: Supple  Lungs: Unlabored breathing  Cardiovascular: No lower extremity edema  Skin: Intact without erythema  Neurologic: Sensation intact to light touch  Psychiatric: Mood and affect are appropriate  Right Ankle Exam     Tenderness   Right ankle tenderness location: TTP along the proximal fibula  Swelling: none    Range of Motion   The patient has normal right ankle ROM  Muscle Strength   The patient has normal right ankle strength      Other   Erythema: absent  Scars: absent  Sensation: normal  Pulse: present     Comments:  Compartments are soft and compressible               Procedures

## 2021-05-11 ENCOUNTER — OFFICE VISIT (OUTPATIENT)
Dept: OBGYN CLINIC | Facility: CLINIC | Age: 86
End: 2021-05-11
Payer: COMMERCIAL

## 2021-05-11 VITALS
HEART RATE: 77 BPM | SYSTOLIC BLOOD PRESSURE: 181 MMHG | BODY MASS INDEX: 28.17 KG/M2 | WEIGHT: 165 LBS | DIASTOLIC BLOOD PRESSURE: 88 MMHG | HEIGHT: 64 IN

## 2021-05-11 DIAGNOSIS — S82.831D OTHER CLOSED FRACTURE OF PROXIMAL END OF RIGHT FIBULA WITH ROUTINE HEALING, SUBSEQUENT ENCOUNTER: ICD-10-CM

## 2021-05-11 DIAGNOSIS — G89.29 CHRONIC PAIN OF RIGHT KNEE: Primary | ICD-10-CM

## 2021-05-11 DIAGNOSIS — M25.561 CHRONIC PAIN OF RIGHT KNEE: Primary | ICD-10-CM

## 2021-05-11 PROCEDURE — 99213 OFFICE O/P EST LOW 20 MIN: CPT | Performed by: PHYSICAL MEDICINE & REHABILITATION

## 2021-05-11 RX ORDER — LIDOCAINE HYDROCHLORIDE 20 MG/ML
JELLY TOPICAL AS NEEDED
Qty: 30 ML | Refills: 2 | Status: SHIPPED | OUTPATIENT
Start: 2021-05-11 | End: 2022-01-25 | Stop reason: HOSPADM

## 2021-05-11 NOTE — PATIENT INSTRUCTIONS
You can obtain Voltaren (diclofenac) cream for your discomfort over the counter if it is not covered by insurance  This is now available over the counter at [employer] and online at [employer]  If it is more expensive to get it through prescription, it is better to get it over the counter  You can use this up to four times per day  It is best to wash the area with water and then pat dry  The cream absorbs better after this  Be careful not to let any pets or children get in contact with the cream as it is a medication and can be harmful to them    If you develop a skin reaction, stop using the cream

## 2021-05-11 NOTE — PROGRESS NOTES
1  Chronic pain of right knee  Diclofenac Sodium (VOLTAREN) 1 %    lidocaine (XYLOCAINE) 2 % topical gel   2  Other closed fracture of proximal end of right fibula with routine healing, subsequent encounter       No orders of the defined types were placed in this encounter  Impression:  Patient is here in follow up of right proximal leg pain secondary to nondisplaced proximal fibula fracture through the fibular head/neck region with date of injury as possibly this past January 2021  X-rays as below  Patient is doing remarkably well  She will discontinue the CAM boot  She should continue home physical therapy to work on strengthening and ambulation training  I will see her back in four weeks for her knee  If having medial knee pain, can consider steroid injection for knee OA  Imaging Studies (I personally reviewed images in PACS and report):  Right knee and right ankle gravity stress x-rays most recent to this encounter reviewed  These images show  nondisplaced proximal fibula fracture to the fibular head/ neck region  There is moderate -severe tricompartmental osteoarthritis in the knee without a joint effusion  The ankle x-rays show no acute osseous abnormality or syndesmosis widening on gravity stress view  Slightly osteopenic appearing bones on these x-rays      Repeat x-rays with focus on the proximal fibula obtained on 4/13/2021: These images show no obvious fracture  Return in about 4 weeks (around 6/8/2021)  HPI:  Latoya Cat is a 80 y o  female  who presents in follow up  Here for No chief complaint on file  Date of injury: Patient had a few falls this past January off of her recliner  Trajectory of symptoms: Doing a lot better  Review of Systems   Constitutional: Positive for activity change  Negative for fever  HENT: Negative for sore throat  Eyes: Negative for visual disturbance  Respiratory: Negative for shortness of breath      Cardiovascular: Negative for chest pain  Gastrointestinal: Negative for abdominal pain  Endocrine: Negative for polydipsia  Genitourinary: Negative for difficulty urinating  Musculoskeletal: Positive for arthralgias  Skin: Negative for rash  Allergic/Immunologic: Negative for immunocompromised state  Neurological: Negative for numbness  Hematological: Does not bruise/bleed easily  Psychiatric/Behavioral: Negative for confusion  Following history reviewed and updated:  Past Medical History:   Diagnosis Date    Arthritis     Hypertension      History reviewed  No pertinent surgical history  Social History   Social History     Substance and Sexual Activity   Alcohol Use None     Social History     Substance and Sexual Activity   Drug Use Not on file     Social History     Tobacco Use   Smoking Status Never Smoker   Smokeless Tobacco Never Used     History reviewed  No pertinent family history  Allergies   Allergen Reactions    Iodinated Diagnostic Agents Hives    Iodine - Food Allergy Other (See Comments)     shellfish and test dye    Nsaids GI Intolerance    Shellfish-Derived Products - Food Allergy Hives        Constitutional:  BP (!) 181/88   Pulse 77   Ht 5' 4" (1 626 m)   Wt 74 8 kg (165 lb)   BMI 28 32 kg/m²    General: NAD  Eyes: Clear sclerae  ENT: No inflammation, lesion, or mass of lips  No tracheal deviation  Musculoskeletal: As mentioned below  Integumentary: No visible rashes or skin lesions  Pulmonary/Chest: Effort normal  No respiratory distress  Neuro: CN's grossly intact, TREVIÑO  Psych: Normal affect and judgement  Vascular: WWP  Right Knee Exam     Tenderness   The patient is experiencing tenderness in the medial joint line  Range of Motion   Extension: normal   Flexion: abnormal     Other   Erythema: absent  Scars: absent  Sensation: normal  Swelling: mild  Effusion: effusion present    Comments:  No longer tender at proximal fibula               Procedures

## 2021-06-15 ENCOUNTER — OFFICE VISIT (OUTPATIENT)
Dept: OBGYN CLINIC | Facility: CLINIC | Age: 86
End: 2021-06-15
Payer: COMMERCIAL

## 2021-06-15 VITALS — BODY MASS INDEX: 28.17 KG/M2 | HEIGHT: 64 IN | WEIGHT: 165 LBS

## 2021-06-15 DIAGNOSIS — G89.29 CHRONIC PAIN OF RIGHT KNEE: ICD-10-CM

## 2021-06-15 DIAGNOSIS — M25.561 CHRONIC PAIN OF RIGHT KNEE: ICD-10-CM

## 2021-06-15 DIAGNOSIS — S82.831D OTHER CLOSED FRACTURE OF PROXIMAL END OF RIGHT FIBULA WITH ROUTINE HEALING, SUBSEQUENT ENCOUNTER: ICD-10-CM

## 2021-06-15 DIAGNOSIS — M17.11 PRIMARY OSTEOARTHRITIS OF RIGHT KNEE: Primary | ICD-10-CM

## 2021-06-15 PROCEDURE — 20610 DRAIN/INJ JOINT/BURSA W/O US: CPT | Performed by: PHYSICAL MEDICINE & REHABILITATION

## 2021-06-15 PROCEDURE — 99213 OFFICE O/P EST LOW 20 MIN: CPT | Performed by: PHYSICAL MEDICINE & REHABILITATION

## 2021-06-15 RX ORDER — LIDOCAINE HYDROCHLORIDE 10 MG/ML
3 INJECTION, SOLUTION INFILTRATION; PERINEURAL
Status: COMPLETED | OUTPATIENT
Start: 2021-06-15 | End: 2021-06-15

## 2021-06-15 RX ORDER — TRIAMCINOLONE ACETONIDE 40 MG/ML
40 INJECTION, SUSPENSION INTRA-ARTICULAR; INTRAMUSCULAR
Status: COMPLETED | OUTPATIENT
Start: 2021-06-15 | End: 2021-06-15

## 2021-06-15 RX ADMIN — LIDOCAINE HYDROCHLORIDE 3 ML: 10 INJECTION, SOLUTION INFILTRATION; PERINEURAL at 16:45

## 2021-06-15 RX ADMIN — TRIAMCINOLONE ACETONIDE 40 MG: 40 INJECTION, SUSPENSION INTRA-ARTICULAR; INTRAMUSCULAR at 16:45

## 2021-06-15 NOTE — PROGRESS NOTES
1  Primary osteoarthritis of right knee  Large joint arthrocentesis: R knee   2  Chronic pain of right knee     3  Other closed fracture of proximal end of right fibula with routine healing, subsequent encounter       Orders Placed This Encounter   Procedures    Large joint arthrocentesis: R knee        Impression:  Patient is here in follow up of right proximal leg pain secondary to nondisplaced proximal fibula fracture through the fibular head/neck region with date of injury as possibly this past January 2021  X-rays as below   Patient presents from a facility with her granddaughter with her  Patient has some difficulties describing where she is having pain in how she is doing  Her daughter reports that she has been having some ups and down with physical therapy but has been walking without discomfort  However, she continues to wear her walking boot  She has severe knee osteoarthritis and so we proceeded with a steroid injection  The patient had a relief of her knee symptoms right afterwards  She will come out of the Cam boot and ambulate as tolerated  I will see her back in 3-4 weeks if she has any symptoms  If still having pain, can consider obtaining x-rays of her knee or ankle joint  Can also consider starting a different medication to help with pain  Imaging Studies (I personally reviewed images in PACS and report):  Right knee and right ankle gravity stress x-rays most recent to this encounter reviewed   These images show  nondisplaced proximal fibula fracture to the fibular head/ neck region  Joseypeyman Atkins is moderate -severe tricompartmental osteoarthritis in the knee without a joint effusion   The ankle x-rays show no acute osseous abnormality or syndesmosis widening on gravity stress view   Slightly osteopenic appearing bones on these x-rays      Repeat x-rays with focus on the proximal fibula obtained on 4/13/2021: These images show no obvious fracture      Return in about 4 weeks (around 7/13/2021), or if symptoms worsen or fail to improve  HPI:  Maggie Gold is a 80 y o  female  who presents in follow up  Here for   Chief Complaint   Patient presents with    Right Ankle - Follow-up, Pain       Date of injury:  Chronic  Trajectory of symptoms:  Has had her ups and downs with physical therapy  Review of Systems   Constitutional: Positive for activity change  Negative for fever  HENT: Negative for sore throat  Eyes: Negative for visual disturbance  Respiratory: Negative for shortness of breath  Cardiovascular: Negative for chest pain  Gastrointestinal: Negative for abdominal pain  Endocrine: Negative for polydipsia  Genitourinary: Negative for difficulty urinating  Musculoskeletal: Positive for arthralgias and gait problem  Skin: Negative for rash  Allergic/Immunologic: Negative for immunocompromised state  Neurological: Negative for weakness and numbness  Hematological: Does not bruise/bleed easily  Psychiatric/Behavioral: Negative for confusion  Following history reviewed and updated:  Past Medical History:   Diagnosis Date    Arthritis     Hypertension      History reviewed  No pertinent surgical history  Social History   Social History     Substance and Sexual Activity   Alcohol Use None     Social History     Substance and Sexual Activity   Drug Use Not on file     Social History     Tobacco Use   Smoking Status Never Smoker   Smokeless Tobacco Never Used     History reviewed  No pertinent family history  Allergies   Allergen Reactions    Iodinated Diagnostic Agents Hives    Iodine - Food Allergy Other (See Comments)     shellfish and test dye    Nsaids GI Intolerance    Shellfish-Derived Products - Food Allergy Hives        Constitutional:  Ht 5' 4" (1 626 m)   Wt 74 8 kg (165 lb)   BMI 28 32 kg/m²    General: NAD  Eyes: Clear sclerae  ENT: No inflammation, lesion, or mass of lips  No tracheal deviation    Musculoskeletal: As mentioned below   Integumentary: No visible rashes or skin lesions  Pulmonary/Chest: Effort normal  No respiratory distress  Neuro: CN's grossly intact, TREVIÑO  Psych: Normal affect and judgement  Vascular: WWP  Right Ankle Exam     Tenderness   The patient is experiencing no tenderness  Swelling: mild    Range of Motion   The patient has normal right ankle ROM  Right Knee Exam     Tenderness   The patient is experiencing tenderness in the lateral joint line, medial joint line and patella  Range of Motion   Extension: normal   Flexion: abnormal     Other   Erythema: absent  Scars: absent  Swelling: none  Effusion: no effusion present    Comments:  Injection site was CDI  Large joint arthrocentesis: R knee  Universal Protocol:  Consent given by: patient  Timeout called at: 6/15/2021 4:44 PM   Site marked: the operative site was marked  Supporting Documentation  Indications: pain   Procedure Details  Location: knee - R knee  Needle gauge: 21G 2''  Ultrasound guidance: no  Approach: Inferolateral to patella  Medications administered: 40 mg triamcinolone acetonide 40 mg/mL; 3 mL lidocaine 1 %    Patient tolerance: patient tolerated the procedure well with no immediate complications  Dressing:  Sterile dressing applied    A 2 inch needle was used and I was able to hub the entire needle  This makes it fairly certain that I am within the intercondylar notch/joint space  There was little to no resistance encountered during the injection  Prior to the procedure, the patient was informed of the following risks in layman terms:    - Risk of bleeding since a needle is involved  - Risk of infection (1/10,000 chance as per recent studies)  Signs/symptoms were discussed and they would prompt an urgent evaluation at an emergency department   - Risk of pigmentation or skin dimpling in the skin (2-3% chance as per recent studies) from the steroid    - Risk of increased pain from steroid flare (1% chance as per recent studies) that typically lasts 24-48 hours  - Risk of increased blood sugars from the steroid medication that can last for a few weeks  If the patient is a diabetic or pre-diabetic, they were encouraged to closely monitor their blood sugars and discuss with PCP if elevated more than usual or if having symptoms  After going over these risks, we decided that the benefits outweigh the risks and proceeded with the procedure

## 2021-09-17 ENCOUNTER — OFFICE VISIT (OUTPATIENT)
Dept: OBGYN CLINIC | Facility: CLINIC | Age: 86
End: 2021-09-17
Payer: COMMERCIAL

## 2021-09-17 VITALS
SYSTOLIC BLOOD PRESSURE: 170 MMHG | HEIGHT: 64 IN | HEART RATE: 76 BPM | DIASTOLIC BLOOD PRESSURE: 85 MMHG | BODY MASS INDEX: 28.17 KG/M2 | WEIGHT: 165 LBS

## 2021-09-17 DIAGNOSIS — M17.11 PRIMARY OSTEOARTHRITIS OF RIGHT KNEE: Primary | ICD-10-CM

## 2021-09-17 PROCEDURE — 20610 DRAIN/INJ JOINT/BURSA W/O US: CPT | Performed by: PHYSICAL MEDICINE & REHABILITATION

## 2021-09-17 PROCEDURE — 99213 OFFICE O/P EST LOW 20 MIN: CPT | Performed by: PHYSICAL MEDICINE & REHABILITATION

## 2021-09-17 RX ORDER — LIDOCAINE HYDROCHLORIDE 10 MG/ML
3 INJECTION, SOLUTION INFILTRATION; PERINEURAL
Status: COMPLETED | OUTPATIENT
Start: 2021-09-17 | End: 2021-09-17

## 2021-09-17 RX ORDER — TRIAMCINOLONE ACETONIDE 40 MG/ML
40 INJECTION, SUSPENSION INTRA-ARTICULAR; INTRAMUSCULAR
Status: COMPLETED | OUTPATIENT
Start: 2021-09-17 | End: 2021-09-17

## 2021-09-17 RX ADMIN — LIDOCAINE HYDROCHLORIDE 3 ML: 10 INJECTION, SOLUTION INFILTRATION; PERINEURAL at 11:41

## 2021-09-17 RX ADMIN — TRIAMCINOLONE ACETONIDE 40 MG: 40 INJECTION, SUSPENSION INTRA-ARTICULAR; INTRAMUSCULAR at 11:41

## 2021-09-17 NOTE — PROGRESS NOTES
1  Primary osteoarthritis of right knee  Large joint arthrocentesis: R knee     Orders Placed This Encounter   Procedures    Large joint arthrocentesis: R knee        Impression:  Patient is here in follow up of right knee pain secondary to osteoarthritis  We discussed different treatment options and decided to proceed with a steroid injection today  Patient had immediate relief of her symptoms afterwards  I will see her back in three months for repeat steroid injection if needed  If her symptoms return earlier than this, I am happy to see her earlier and would consider a ketorolac injection  Can also consider pain management consult for genicular nerve block and/or viscosupplementation  Imaging Studies (I personally reviewed images in PACS and report):  Right knee and right ankle gravity stress x-rays most recent to this encounter reviewed   These images show  nondisplaced proximal fibula fracture to the fibular head/ neck region  Jitendra Leal is moderate -severe tricompartmental osteoarthritis in the knee without a joint effusion   The ankle x-rays show no acute osseous abnormality or syndesmosis widening on gravity stress view   Slightly osteopenic appearing bones on these x-rays      Repeat x-rays with focus on the proximal fibula obtained on 4/13/2021: These images show no obvious fracture  No follow-ups on file  Patient is in agreement with the above plan  HPI:  Marian Cohen is a 80 y o  female  who presents in follow up  Here for   Chief Complaint   Patient presents with    Right Knee - Follow-up       Date of injury:  Chronic  Trajectory of symptoms:  Pain returned two months after her last steroid injection  Review of Systems   Constitutional: Positive for activity change  Negative for fever  HENT: Negative for sore throat  Eyes: Negative for visual disturbance  Respiratory: Negative for shortness of breath  Cardiovascular: Negative for chest pain     Gastrointestinal: Negative for abdominal pain  Endocrine: Negative for polydipsia  Genitourinary: Negative for difficulty urinating  Musculoskeletal: Positive for arthralgias and gait problem  Skin: Negative for rash  Allergic/Immunologic: Negative for immunocompromised state  Neurological: Negative for numbness  Hematological: Does not bruise/bleed easily  Psychiatric/Behavioral: Negative for confusion  Following history reviewed and updated:  Past Medical History:   Diagnosis Date    Arthritis     Hypertension      History reviewed  No pertinent surgical history  Social History   Social History     Substance and Sexual Activity   Alcohol Use None     Social History     Substance and Sexual Activity   Drug Use Not on file     Social History     Tobacco Use   Smoking Status Never Smoker   Smokeless Tobacco Never Used     History reviewed  No pertinent family history  Allergies   Allergen Reactions    Iodinated Diagnostic Agents Hives    Iodine - Food Allergy Other (See Comments)     shellfish and test dye    Nsaids GI Intolerance    Shellfish-Derived Products - Food Allergy Hives        Constitutional:  /85   Pulse 76   Ht 5' 4" (1 626 m)   Wt 74 8 kg (165 lb)   BMI 28 32 kg/m²    General: NAD  Eyes: Clear sclerae  ENT: No inflammation, lesion, or mass of lips  No tracheal deviation  Musculoskeletal: As mentioned below  Integumentary: No visible rashes or skin lesions  Pulmonary/Chest: Effort normal  No respiratory distress  Neuro: CN's grossly intact, TREVIÑO  Psych: Normal affect and judgement  Vascular: WWP  Right Knee Exam     Tenderness   The patient is experiencing tenderness in the medial joint line and lateral joint line  Range of Motion   Extension: normal   Flexion: abnormal     Other   Erythema: absent  Scars: absent  Sensation: normal  Pulse: present    Comments:  Injection site is CDI               Large joint arthrocentesis: R knee  Universal Protocol:  Consent given by: patient  Timeout called at: 9/17/2021 11:40 AM   Site marked: the operative site was marked  Supporting Documentation  Indications: pain   Procedure Details  Location: knee - R knee  Needle gauge: 21G 2''  Ultrasound guidance: no  Approach: Inferolateral to patella  Medications administered: 40 mg triamcinolone acetonide 40 mg/mL; 3 mL lidocaine 1 %    Patient tolerance: patient tolerated the procedure well with no immediate complications  Dressing:  Sterile dressing applied    A 2 inch needle was used and I was able to hub the entire needle  This makes it fairly certain that I am within the intercondylar notch/joint space  There was little to no resistance encountered during the injection  Prior to the procedure, the patient was informed of the following risks in layman terms:    - Risk of bleeding since a needle is involved  - Risk of infection (1/10,000 chance as per recent studies)  Signs/symptoms were discussed and they would prompt an urgent evaluation at an emergency department   - Risk of pigmentation or skin dimpling in the skin (2-3% chance as per recent studies) from the steroid  - Risk of increased pain from steroid flare (1% chance as per recent studies) that typically lasts 24-48 hours  - Risk of increased blood sugars from the steroid medication that can last for a few weeks  If the patient is a diabetic or pre-diabetic, they were encouraged to closely monitor their blood sugars and discuss with PCP if elevated more than usual or if having symptoms  After going over these risks, we decided that the benefits outweigh the risks and proceeded with the procedure

## 2021-10-06 ENCOUNTER — HOSPITAL ENCOUNTER (EMERGENCY)
Facility: HOSPITAL | Age: 86
Discharge: HOME | End: 2021-10-06
Attending: EMERGENCY MEDICINE
Payer: COMMERCIAL

## 2021-10-06 ENCOUNTER — APPOINTMENT (EMERGENCY)
Dept: RADIOLOGY | Facility: HOSPITAL | Age: 86
End: 2021-10-06
Payer: COMMERCIAL

## 2021-10-06 VITALS
RESPIRATION RATE: 18 BRPM | SYSTOLIC BLOOD PRESSURE: 185 MMHG | HEART RATE: 65 BPM | DIASTOLIC BLOOD PRESSURE: 98 MMHG | OXYGEN SATURATION: 94 % | TEMPERATURE: 98.2 F

## 2021-10-06 DIAGNOSIS — I99.8 POORLY CONTROLLED BLOOD PRESSURE: Primary | ICD-10-CM

## 2021-10-06 LAB
ALBUMIN SERPL BCP-MCNC: 3.5 G/DL (ref 3.5–5)
ALP SERPL-CCNC: 99 U/L (ref 46–116)
ALT SERPL W P-5'-P-CCNC: 20 U/L (ref 12–78)
ANION GAP SERPL CALCULATED.3IONS-SCNC: 10 MMOL/L (ref 4–13)
AST SERPL W P-5'-P-CCNC: 26 U/L (ref 5–45)
BASOPHILS # BLD AUTO: 0.03 THOUSANDS/ΜL (ref 0–0.1)
BASOPHILS NFR BLD AUTO: 0 % (ref 0–1)
BILIRUB SERPL-MCNC: 0.36 MG/DL (ref 0.2–1)
BUN SERPL-MCNC: 19 MG/DL (ref 5–25)
CALCIUM SERPL-MCNC: 8.6 MG/DL (ref 8.3–10.1)
CHLORIDE SERPL-SCNC: 100 MMOL/L (ref 100–108)
CO2 SERPL-SCNC: 26 MMOL/L (ref 21–32)
CREAT SERPL-MCNC: 0.6 MG/DL (ref 0.6–1.3)
EOSINOPHIL # BLD AUTO: 0.18 THOUSAND/ΜL (ref 0–0.61)
EOSINOPHIL NFR BLD AUTO: 3 % (ref 0–6)
ERYTHROCYTE [DISTWIDTH] IN BLOOD BY AUTOMATED COUNT: 12.6 % (ref 11.6–15.1)
GFR SERPL CREATININE-BSD FRML MDRD: 80 ML/MIN/1.73SQ M
GLUCOSE SERPL-MCNC: 101 MG/DL (ref 65–140)
HCT VFR BLD AUTO: 41.4 % (ref 34.8–46.1)
HGB BLD-MCNC: 13.2 G/DL (ref 11.5–15.4)
IMM GRANULOCYTES # BLD AUTO: 0.03 THOUSAND/UL (ref 0–0.2)
IMM GRANULOCYTES NFR BLD AUTO: 0 % (ref 0–2)
LYMPHOCYTES # BLD AUTO: 1.07 THOUSANDS/ΜL (ref 0.6–4.47)
LYMPHOCYTES NFR BLD AUTO: 15 % (ref 14–44)
MCH RBC QN AUTO: 32 PG (ref 26.8–34.3)
MCHC RBC AUTO-ENTMCNC: 31.9 G/DL (ref 31.4–37.4)
MCV RBC AUTO: 100 FL (ref 82–98)
MONOCYTES # BLD AUTO: 0.51 THOUSAND/ΜL (ref 0.17–1.22)
MONOCYTES NFR BLD AUTO: 7 % (ref 4–12)
NEUTROPHILS # BLD AUTO: 5.34 THOUSANDS/ΜL (ref 1.85–7.62)
NEUTS SEG NFR BLD AUTO: 75 % (ref 43–75)
NRBC BLD AUTO-RTO: 0 /100 WBCS
NT-PROBNP SERPL-MCNC: 570 PG/ML
PLATELET # BLD AUTO: 254 THOUSANDS/UL (ref 149–390)
PMV BLD AUTO: 8.5 FL (ref 8.9–12.7)
POTASSIUM SERPL-SCNC: 4.8 MMOL/L (ref 3.5–5.3)
PROT SERPL-MCNC: 7.1 G/DL (ref 6.4–8.2)
RBC # BLD AUTO: 4.13 MILLION/UL (ref 3.81–5.12)
SODIUM SERPL-SCNC: 136 MMOL/L (ref 136–145)
TROPONIN I SERPL-MCNC: <0.02 NG/ML
TSH SERPL DL<=0.05 MIU/L-ACNC: 2.37 UIU/ML (ref 0.36–3.74)
WBC # BLD AUTO: 7.16 THOUSAND/UL (ref 4.31–10.16)

## 2021-10-06 PROCEDURE — 84484 ASSAY OF TROPONIN QUANT: CPT | Performed by: EMERGENCY MEDICINE

## 2021-10-06 PROCEDURE — 36415 COLL VENOUS BLD VENIPUNCTURE: CPT | Performed by: EMERGENCY MEDICINE

## 2021-10-06 PROCEDURE — 80053 COMPREHEN METABOLIC PANEL: CPT | Performed by: EMERGENCY MEDICINE

## 2021-10-06 PROCEDURE — 84443 ASSAY THYROID STIM HORMONE: CPT | Performed by: EMERGENCY MEDICINE

## 2021-10-06 PROCEDURE — 99284 EMERGENCY DEPT VISIT MOD MDM: CPT | Performed by: EMERGENCY MEDICINE

## 2021-10-06 PROCEDURE — 85025 COMPLETE CBC W/AUTO DIFF WBC: CPT | Performed by: EMERGENCY MEDICINE

## 2021-10-06 PROCEDURE — 83880 ASSAY OF NATRIURETIC PEPTIDE: CPT | Performed by: EMERGENCY MEDICINE

## 2021-10-06 PROCEDURE — 99284 EMERGENCY DEPT VISIT MOD MDM: CPT

## 2021-10-06 PROCEDURE — 71045 X-RAY EXAM CHEST 1 VIEW: CPT

## 2021-10-06 PROCEDURE — 93005 ELECTROCARDIOGRAM TRACING: CPT

## 2021-10-06 RX ORDER — LISINOPRIL 10 MG/1
20 TABLET ORAL ONCE
Status: COMPLETED | OUTPATIENT
Start: 2021-10-06 | End: 2021-10-06

## 2021-10-06 RX ORDER — ACETAMINOPHEN 325 MG/1
650 TABLET ORAL ONCE
Status: COMPLETED | OUTPATIENT
Start: 2021-10-06 | End: 2021-10-06

## 2021-10-06 RX ORDER — PROPRANOLOL HYDROCHLORIDE 20 MG/1
10 TABLET ORAL ONCE
Status: COMPLETED | OUTPATIENT
Start: 2021-10-06 | End: 2021-10-06

## 2021-10-06 RX ADMIN — ACETAMINOPHEN 650 MG: 325 TABLET, FILM COATED ORAL at 16:04

## 2021-10-06 RX ADMIN — PROPRANOLOL HYDROCHLORIDE 10 MG: 20 TABLET ORAL at 16:04

## 2021-10-06 RX ADMIN — LISINOPRIL 20 MG: 10 TABLET ORAL at 14:25

## 2021-10-08 LAB
ATRIAL RATE: 68 BPM
P AXIS: 69 DEGREES
PR INTERVAL: 152 MS
QRS AXIS: 40 DEGREES
QRSD INTERVAL: 78 MS
QT INTERVAL: 380 MS
QTC INTERVAL: 404 MS
T WAVE AXIS: 69 DEGREES
VENTRICULAR RATE: 68 BPM

## 2021-10-08 PROCEDURE — 93010 ELECTROCARDIOGRAM REPORT: CPT | Performed by: INTERNAL MEDICINE

## 2021-12-17 ENCOUNTER — OFFICE VISIT (OUTPATIENT)
Dept: OBGYN CLINIC | Facility: CLINIC | Age: 86
End: 2021-12-17
Payer: COMMERCIAL

## 2021-12-17 VITALS
HEIGHT: 64 IN | BODY MASS INDEX: 27.31 KG/M2 | HEART RATE: 84 BPM | SYSTOLIC BLOOD PRESSURE: 150 MMHG | DIASTOLIC BLOOD PRESSURE: 84 MMHG | WEIGHT: 160 LBS

## 2021-12-17 DIAGNOSIS — M17.11 PRIMARY OSTEOARTHRITIS OF RIGHT KNEE: Primary | ICD-10-CM

## 2021-12-17 PROCEDURE — 99212 OFFICE O/P EST SF 10 MIN: CPT | Performed by: PHYSICAL MEDICINE & REHABILITATION

## 2021-12-17 PROCEDURE — 20610 DRAIN/INJ JOINT/BURSA W/O US: CPT | Performed by: PHYSICAL MEDICINE & REHABILITATION

## 2021-12-17 RX ORDER — TRIAMCINOLONE ACETONIDE 40 MG/ML
40 INJECTION, SUSPENSION INTRA-ARTICULAR; INTRAMUSCULAR
Status: COMPLETED | OUTPATIENT
Start: 2021-12-17 | End: 2021-12-17

## 2021-12-17 RX ORDER — AMLODIPINE BESYLATE 2.5 MG/1
2.5 TABLET ORAL DAILY
COMMUNITY
Start: 2021-12-02 | End: 2022-02-09 | Stop reason: ALTCHOICE

## 2021-12-17 RX ORDER — LIDOCAINE HYDROCHLORIDE 10 MG/ML
3 INJECTION, SOLUTION INFILTRATION; PERINEURAL
Status: COMPLETED | OUTPATIENT
Start: 2021-12-17 | End: 2021-12-17

## 2021-12-17 RX ADMIN — LIDOCAINE HYDROCHLORIDE 3 ML: 10 INJECTION, SOLUTION INFILTRATION; PERINEURAL at 11:42

## 2021-12-17 RX ADMIN — TRIAMCINOLONE ACETONIDE 40 MG: 40 INJECTION, SUSPENSION INTRA-ARTICULAR; INTRAMUSCULAR at 11:42

## 2022-01-24 ENCOUNTER — APPOINTMENT (EMERGENCY)
Dept: RADIOLOGY | Facility: HOSPITAL | Age: 87
End: 2022-01-24
Payer: COMMERCIAL

## 2022-01-24 ENCOUNTER — HOSPITAL ENCOUNTER (OUTPATIENT)
Facility: HOSPITAL | Age: 87
Setting detail: OBSERVATION
Discharge: HOME | End: 2022-01-25
Attending: EMERGENCY MEDICINE | Admitting: INTERNAL MEDICINE
Payer: COMMERCIAL

## 2022-01-24 DIAGNOSIS — R06.02 SOB (SHORTNESS OF BREATH): ICD-10-CM

## 2022-01-24 DIAGNOSIS — J21.0 RSV (ACUTE BRONCHIOLITIS DUE TO RESPIRATORY SYNCYTIAL VIRUS): ICD-10-CM

## 2022-01-24 DIAGNOSIS — R09.02 HYPOXIA: Primary | ICD-10-CM

## 2022-01-24 PROBLEM — J96.00 ACUTE RESPIRATORY FAILURE (HCC): Status: ACTIVE | Noted: 2022-01-24

## 2022-01-24 PROBLEM — R60.0 BILATERAL LOWER EXTREMITY EDEMA: Status: ACTIVE | Noted: 2022-01-24

## 2022-01-24 PROBLEM — I10 HTN (HYPERTENSION): Chronic | Status: ACTIVE | Noted: 2021-03-25

## 2022-01-24 PROBLEM — E03.9 HYPOTHYROIDISM: Chronic | Status: ACTIVE | Noted: 2022-01-24

## 2022-01-24 PROBLEM — B33.8 RSV (RESPIRATORY SYNCYTIAL VIRUS INFECTION): Status: ACTIVE | Noted: 2022-01-24

## 2022-01-24 PROBLEM — R60.0 BILATERAL LOWER EXTREMITY EDEMA: Chronic | Status: ACTIVE | Noted: 2022-01-24

## 2022-01-24 PROBLEM — E03.9 HYPOTHYROIDISM: Status: ACTIVE | Noted: 2022-01-24

## 2022-01-24 LAB
ALBUMIN SERPL BCP-MCNC: 3.4 G/DL (ref 3.5–5)
ALP SERPL-CCNC: 95 U/L (ref 46–116)
ALT SERPL W P-5'-P-CCNC: 16 U/L (ref 12–78)
ANION GAP SERPL CALCULATED.3IONS-SCNC: 7 MMOL/L (ref 4–13)
AST SERPL W P-5'-P-CCNC: 22 U/L (ref 5–45)
BASOPHILS # BLD AUTO: 0.04 THOUSANDS/ΜL (ref 0–0.1)
BASOPHILS NFR BLD AUTO: 1 % (ref 0–1)
BILIRUB SERPL-MCNC: 0.23 MG/DL (ref 0.2–1)
BUN SERPL-MCNC: 22 MG/DL (ref 5–25)
CALCIUM ALBUM COR SERPL-MCNC: 9.2 MG/DL (ref 8.3–10.1)
CALCIUM SERPL-MCNC: 8.7 MG/DL (ref 8.3–10.1)
CARDIAC TROPONIN I PNL SERPL HS: 6 NG/L
CHLORIDE SERPL-SCNC: 104 MMOL/L (ref 100–108)
CO2 SERPL-SCNC: 29 MMOL/L (ref 21–32)
CREAT SERPL-MCNC: 0.86 MG/DL (ref 0.6–1.3)
EOSINOPHIL # BLD AUTO: 0.19 THOUSAND/ΜL (ref 0–0.61)
EOSINOPHIL NFR BLD AUTO: 3 % (ref 0–6)
ERYTHROCYTE [DISTWIDTH] IN BLOOD BY AUTOMATED COUNT: 13.1 % (ref 11.6–15.1)
FLUAV RNA RESP QL NAA+PROBE: NEGATIVE
FLUBV RNA RESP QL NAA+PROBE: NEGATIVE
GFR SERPL CREATININE-BSD FRML MDRD: 59 ML/MIN/1.73SQ M
GLUCOSE SERPL-MCNC: 112 MG/DL (ref 65–140)
HCT VFR BLD AUTO: 38.7 % (ref 34.8–46.1)
HGB BLD-MCNC: 12.7 G/DL (ref 11.5–15.4)
IMM GRANULOCYTES # BLD AUTO: 0.03 THOUSAND/UL (ref 0–0.2)
IMM GRANULOCYTES NFR BLD AUTO: 0 % (ref 0–2)
LYMPHOCYTES # BLD AUTO: 1.02 THOUSANDS/ΜL (ref 0.6–4.47)
LYMPHOCYTES NFR BLD AUTO: 14 % (ref 14–44)
MCH RBC QN AUTO: 32.8 PG (ref 26.8–34.3)
MCHC RBC AUTO-ENTMCNC: 32.8 G/DL (ref 31.4–37.4)
MCV RBC AUTO: 100 FL (ref 82–98)
MONOCYTES # BLD AUTO: 0.71 THOUSAND/ΜL (ref 0.17–1.22)
MONOCYTES NFR BLD AUTO: 10 % (ref 4–12)
NEUTROPHILS # BLD AUTO: 5.2 THOUSANDS/ΜL (ref 1.85–7.62)
NEUTS SEG NFR BLD AUTO: 72 % (ref 43–75)
NRBC BLD AUTO-RTO: 0 /100 WBCS
PLATELET # BLD AUTO: 231 THOUSANDS/UL (ref 149–390)
PMV BLD AUTO: 9 FL (ref 8.9–12.7)
POTASSIUM SERPL-SCNC: 4.3 MMOL/L (ref 3.5–5.3)
PROT SERPL-MCNC: 7 G/DL (ref 6.4–8.2)
RBC # BLD AUTO: 3.87 MILLION/UL (ref 3.81–5.12)
RSV RNA RESP QL NAA+PROBE: POSITIVE
SARS-COV-2 RNA RESP QL NAA+PROBE: NEGATIVE
SODIUM SERPL-SCNC: 140 MMOL/L (ref 136–145)
WBC # BLD AUTO: 7.19 THOUSAND/UL (ref 4.31–10.16)

## 2022-01-24 PROCEDURE — 85025 COMPLETE CBC W/AUTO DIFF WBC: CPT

## 2022-01-24 PROCEDURE — 84484 ASSAY OF TROPONIN QUANT: CPT

## 2022-01-24 PROCEDURE — 99285 EMERGENCY DEPT VISIT HI MDM: CPT

## 2022-01-24 PROCEDURE — 93005 ELECTROCARDIOGRAM TRACING: CPT

## 2022-01-24 PROCEDURE — 36415 COLL VENOUS BLD VENIPUNCTURE: CPT

## 2022-01-24 PROCEDURE — 71045 X-RAY EXAM CHEST 1 VIEW: CPT

## 2022-01-24 PROCEDURE — 80053 COMPREHEN METABOLIC PANEL: CPT

## 2022-01-24 PROCEDURE — 0241U HB NFCT DS VIR RESP RNA 4 TRGT: CPT

## 2022-01-24 PROCEDURE — 99285 EMERGENCY DEPT VISIT HI MDM: CPT | Performed by: EMERGENCY MEDICINE

## 2022-01-24 PROCEDURE — 99220 PR INITIAL OBSERVATION CARE/DAY 70 MINUTES: CPT | Performed by: INTERNAL MEDICINE

## 2022-01-24 RX ORDER — PANTOPRAZOLE SODIUM 40 MG/1
40 TABLET, DELAYED RELEASE ORAL
Status: DISCONTINUED | OUTPATIENT
Start: 2022-01-25 | End: 2022-01-25 | Stop reason: HOSPADM

## 2022-01-24 RX ORDER — AMLODIPINE BESYLATE 2.5 MG/1
2.5 TABLET ORAL DAILY
Status: DISCONTINUED | OUTPATIENT
Start: 2022-01-25 | End: 2022-01-25 | Stop reason: HOSPADM

## 2022-01-24 RX ORDER — SERTRALINE HYDROCHLORIDE 25 MG/1
25 TABLET, FILM COATED ORAL DAILY
Status: DISCONTINUED | OUTPATIENT
Start: 2022-01-25 | End: 2022-01-25 | Stop reason: HOSPADM

## 2022-01-24 RX ORDER — ACETAMINOPHEN 325 MG/1
650 TABLET ORAL EVERY 6 HOURS PRN
Status: DISCONTINUED | OUTPATIENT
Start: 2022-01-24 | End: 2022-01-25 | Stop reason: HOSPADM

## 2022-01-24 RX ORDER — LIDOCAINE HYDROCHLORIDE 20 MG/ML
JELLY TOPICAL DAILY PRN
Status: DISCONTINUED | OUTPATIENT
Start: 2022-01-24 | End: 2022-01-25 | Stop reason: HOSPADM

## 2022-01-24 RX ORDER — LANOLIN ALCOHOL/MO/W.PET/CERES
6 CREAM (GRAM) TOPICAL
Status: DISCONTINUED | OUTPATIENT
Start: 2022-01-24 | End: 2022-01-25 | Stop reason: HOSPADM

## 2022-01-24 RX ORDER — LORATADINE 10 MG/1
10 TABLET ORAL DAILY
Status: DISCONTINUED | OUTPATIENT
Start: 2022-01-25 | End: 2022-01-25 | Stop reason: HOSPADM

## 2022-01-24 RX ORDER — GABAPENTIN 300 MG/1
300 CAPSULE ORAL 3 TIMES DAILY
Status: DISCONTINUED | OUTPATIENT
Start: 2022-01-24 | End: 2022-01-25 | Stop reason: HOSPADM

## 2022-01-24 RX ORDER — LISINOPRIL 10 MG/1
10 TABLET ORAL DAILY
Status: DISCONTINUED | OUTPATIENT
Start: 2022-01-25 | End: 2022-01-25 | Stop reason: HOSPADM

## 2022-01-24 RX ORDER — PROPRANOLOL HYDROCHLORIDE 20 MG/1
10 TABLET ORAL 3 TIMES DAILY
Status: DISCONTINUED | OUTPATIENT
Start: 2022-01-24 | End: 2022-01-25 | Stop reason: HOSPADM

## 2022-01-24 RX ORDER — LABETALOL 20 MG/4 ML (5 MG/ML) INTRAVENOUS SYRINGE
10 EVERY 4 HOURS PRN
Status: DISCONTINUED | OUTPATIENT
Start: 2022-01-24 | End: 2022-01-25 | Stop reason: HOSPADM

## 2022-01-24 RX ORDER — LEVOTHYROXINE SODIUM 0.03 MG/1
25 TABLET ORAL
Status: DISCONTINUED | OUTPATIENT
Start: 2022-01-25 | End: 2022-01-25 | Stop reason: HOSPADM

## 2022-01-24 NOTE — ED PROVIDER NOTES
History  Chief Complaint   Patient presents with    Shortness of Breath     Pt arrives via EMS from nursing home, c/o cough, running nose and a headache the last few days  AS per EMS tablemate has RSV and nursing home requesting she gets tested for it  Covid negative at nursing home  Patient is a 25-year-old female with a past medical history of hypertension, hypothyroidism, arthritis, GERD who presents with a 4 day history of headache, congestion, runny nose, and cough productive of white thick sputum  Symptoms are accompanied by sinus pressure, malaise and shortness of breath that is worse with leaning forward  Patient reports that symptoms began on 1/21/21 when she started experiencing a headache and runny nose as well as watery eyes  Per EMS, patient was tested for COVID at her nursing home and tested negative  She reports that the person who sits across from her during meals had similar symptoms and tested positive for RSV  She also states that this morning she felt dizzy, but this has now resolved  Patient endorses an episode of nonbloody diarrhea which she attributes to eating sauerkraut earlier today  On presentation today she notes having trace lower extremity edema which is not new for her and is attributed to her arthritis  She denies fevers, chills, sore throat, chest pain, nausea, vomiting, abdominal pain or distension, dysuria  Prior to Admission Medications   Prescriptions Last Dose Informant Patient Reported? Taking?    ALPRAZolam (XANAX) 0 25 mg tablet   Yes No   Sig: Take by mouth daily at bedtime as needed for anxiety   Brimonidine Tartrate-Timolol (COMBIGAN OP)   Yes No   Sig: Apply to eye   Calcium 250 MG CAPS   Yes No   Sig: Take 500 mg by mouth   Diclofenac Sodium (VOLTAREN) 1 %   No No   Sig: Apply 2 g topically 2 (two) times a day as needed (Pain)   Melatonin 10 MG TABS   Yes No   Sig: Take 10 mg by mouth   Tafluprost (ZIOPTAN OP)   Yes No   Sig: Apply to eye   acetaminophen (TYLENOL) 325 mg tablet   No No   Sig: Take 3 tablets (975 mg total) by mouth every 8 (eight) hours   amLODIPine (NORVASC) 2 5 mg tablet   Yes No   Sig: Take 2 5 mg by mouth daily   cycloSPORINE (RESTASIS) 0 05 % ophthalmic emulsion   Yes No   Si drop 2 (two) times a day   gabapentin (NEURONTIN) 300 mg capsule   Yes No   Sig: Take 300 mg by mouth 3 (three) times a day   levothyroxine 25 mcg tablet   Yes No   Sig: Take 25 mcg by mouth daily   lidocaine (XYLOCAINE) 2 % topical gel   No No   Sig: Apply topically as needed for mild pain   lisinopril (ZESTRIL) 10 mg tablet   Yes No   Sig: Take 10 mg by mouth daily   loratadine (CLARITIN) 10 mg tablet   Yes No   Sig: Take 10 mg by mouth daily   meloxicam (MOBIC) 15 mg tablet   Yes No   Sig: Take 15 mg by mouth daily   omeprazole (PriLOSEC) 20 mg delayed release capsule   Yes No   Sig: Take 20 mg by mouth daily   propranolol (INDERAL) 10 mg tablet   Yes No   Sig: Take 10 mg by mouth 3 (three) times a day   sertraline (ZOLOFT) 25 mg tablet   Yes No   Sig: Take 25 mg by mouth daily      Facility-Administered Medications: None       Past Medical History:   Diagnosis Date    Abnormal gait     Anxiety     Arthritis     DJD (degenerative joint disease)     GERD (gastroesophageal reflux disease)     HTN (hypertension)     Hypertension     Hypothyroidism     Osteoporosis        History reviewed  No pertinent surgical history  History reviewed  No pertinent family history  I have reviewed and agree with the history as documented  E-Cigarette/Vaping    E-Cigarette Use Never User      E-Cigarette/Vaping Substances     Social History     Tobacco Use    Smoking status: Never Smoker    Smokeless tobacco: Never Used   Vaping Use    Vaping Use: Never used   Substance Use Topics    Alcohol use: Never    Drug use: Never        Review of Systems   Constitutional: Negative for activity change, appetite change, chills, fatigue and fever     HENT: Positive for congestion, rhinorrhea, sinus pressure and sinus pain  Negative for sore throat and trouble swallowing  Eyes: Positive for discharge (eyes watering)  Respiratory: Positive for cough (Productive of white sputum) and shortness of breath (Intermittent)  Negative for chest tightness, wheezing and stridor  Cardiovascular: Positive for leg swelling (Bilaterally, not new per patient)  Negative for chest pain and palpitations  Gastrointestinal: Positive for diarrhea  Negative for abdominal distention, abdominal pain, constipation, nausea and vomiting  Genitourinary: Negative for difficulty urinating and dysuria  Musculoskeletal: Positive for back pain  Negative for myalgias  Neurological: Positive for dizziness (This morning, intermittently, has been going on for some time) and headaches  Physical Exam  ED Triage Vitals   Temperature Pulse Respirations Blood Pressure SpO2   01/24/22 1608 01/24/22 1608 01/24/22 1608 01/24/22 1608 01/24/22 1600   98 6 °F (37 °C) 85 20 (!) 171/96 91 %      Temp Source Heart Rate Source Patient Position - Orthostatic VS BP Location FiO2 (%)   01/24/22 1608 01/24/22 1608 -- 01/24/22 1921 --   Oral Monitor  Left arm       Pain Score       01/24/22 1608       No Pain             Orthostatic Vital Signs  Vitals:    01/24/22 1608 01/24/22 1615 01/24/22 1921   BP: (!) 171/96 166/91 (!) 181/94   Pulse: 85 82 77       Physical Exam  Vitals reviewed  Constitutional:       General: She is not in acute distress  Appearance: She is well-developed  She is not ill-appearing, toxic-appearing or diaphoretic  HENT:      Head: Normocephalic and atraumatic  Cardiovascular:      Rate and Rhythm: Normal rate and regular rhythm  Pulses: Normal pulses  Heart sounds: Normal heart sounds  Pulmonary:      Effort: Pulmonary effort is normal       Breath sounds: Normal breath sounds  No wheezing, rhonchi or rales     Abdominal:      General: Bowel sounds are normal  Palpations: Abdomen is soft  Tenderness: There is no abdominal tenderness  There is no guarding or rebound  Musculoskeletal:      Right hand: Deformity present  Left hand: Deformity present  Cervical back: Normal range of motion and neck supple  Right lower leg: Tenderness (Tenderness to palpation over the lateral malleolus, patient reports history of arthritis) present  Edema present  Left lower leg: Edema present  Comments: Trace pitting edema in the lower extremities bilaterally   Skin:     General: Skin is warm and dry  Neurological:      Mental Status: She is alert  Psychiatric:         Mood and Affect: Mood is anxious  Behavior: Behavior normal          ED Medications  Medications - No data to display    Diagnostic Studies  Results Reviewed     Procedure Component Value Units Date/Time    HS Troponin I 4hr [[id number]]     Lab Status: No result Specimen: Blood     COVID/FLU/RSV [[id number]]  (Abnormal) Collected: 01/24/22 1719    Lab Status: Final result Specimen: Nares from Nasopharyngeal Swab Updated: 01/24/22 1826     SARS-CoV-2 Negative     INFLUENZA A PCR Negative     INFLUENZA B PCR Negative     RSV PCR Positive    Narrative:      FOR PEDIATRIC PATIENTS - copy/paste COVID Guidelines URL to browser: [email or web address]  [email or web address]    SARS-CoV-2 assay is a Nucleic Acid Amplification assay intended for the  qualitative detection of nucleic acid from SARS-CoV-2 in nasopharyngeal  swabs  Results are for the presumptive identification of SARS-CoV-2 RNA  Positive results are indicative of infection with SARS-CoV-2, the virus  causing COVID-19, but do not rule out bacterial infection or co-infection  with other viruses  Laboratories within the [place] and its  territories are required to report all positive results to the appropriate  public health authorities   Negative results do not preclude SARS-CoV-2  infection and should not be used as the sole basis for treatment or other  patient management decisions  Negative results must be combined with  clinical observations, patient history, and epidemiological information  This test has not been FDA cleared or approved  This test has been authorized by FDA under an Emergency Use Authorization  (EUA)  This test is only authorized for the duration of time the  declaration that circumstances exist justifying the authorization of the  emergency use of an in vitro diagnostic tests for detection of SARS-CoV-2  virus and/or diagnosis of COVID-19 infection under section 564(b)(1) of  the Act, 21 U  S C  374GUK-2(Q)(5), unless the authorization is terminated  or revoked sooner  The test has been validated but independent review by FDA  and CLIA is pending  Test performed using [employer] Gene[employer]: This RT-PCR assay targets N2,  a region unique to SARS-CoV-2  A conserved region in the E-gene was chosen  for pan-Sarbecovirus detection which includes SARS-CoV-2      HS Troponin 0hr (reflex protocol) [[id number]] Collected: 01/24/22 1719    Lab Status: Final result Specimen: Blood from Arm, Right Updated: 01/24/22 1819     hs TnI 0hr 6 ng/L     HS Troponin I 2hr [[id number]]     Lab Status: No result Specimen: Blood     Comprehensive metabolic panel [[id number]]  (Abnormal) Collected: 01/24/22 1719    Lab Status: Final result Specimen: Blood from Arm, Right Updated: 01/24/22 1757     Sodium 140 mmol/L      Potassium 4 3 mmol/L      Chloride 104 mmol/L      CO2 29 mmol/L      ANION GAP 7 mmol/L      BUN 22 mg/dL      Creatinine 0 86 mg/dL      Glucose 112 mg/dL      Calcium 8 7 mg/dL      Corrected Calcium 9 2 mg/dL      AST 22 U/L      ALT 16 U/L      Alkaline Phosphatase 95 U/L      Total Protein 7 0 g/dL      Albumin 3 4 g/dL      Total Bilirubin 0 23 mg/dL      eGFR 59 ml/min/1 73sq m     Narrative:      Renee guidelines for Chronic Kidney Disease (CKD):     Stage 1 with normal or high GFR (GFR > 90 mL/min/1 73 square meters)    Stage 2 Mild CKD (GFR = 60-89 mL/min/1 73 square meters)    Stage 3A Moderate CKD (GFR = 45-59 mL/min/1 73 square meters)    Stage 3B Moderate CKD (GFR = 30-44 mL/min/1 73 square meters)    Stage 4 Severe CKD (GFR = 15-29 mL/min/1 73 square meters)    Stage 5 End Stage CKD (GFR <15 mL/min/1 73 square meters)  Note: GFR calculation is accurate only with a steady state creatinine    CBC and differential [[id number]]  (Abnormal) Collected: 01/24/22 1719    Lab Status: Final result Specimen: Blood from Arm, Right Updated: 01/24/22 1745     WBC 7 19 Thousand/uL      RBC 3 87 Million/uL      Hemoglobin 12 7 g/dL      Hematocrit 38 7 %       fL      MCH 32 8 pg      MCHC 32 8 g/dL      RDW 13 1 %      MPV 9 0 fL      Platelets 825 Thousands/uL      nRBC 0 /100 WBCs      Neutrophils Relative 72 %      Immat GRANS % 0 %      Lymphocytes Relative 14 %      Monocytes Relative 10 %      Eosinophils Relative 3 %      Basophils Relative 1 %      Neutrophils Absolute 5 20 Thousands/µL      Immature Grans Absolute 0 03 Thousand/uL      Lymphocytes Absolute 1 02 Thousands/µL      Monocytes Absolute 0 71 Thousand/µL      Eosinophils Absolute 0 19 Thousand/µL      Basophils Absolute 0 04 Thousands/µL                  XR chest 1 view portable   Final Result by Jose Manuel Small MD (01/24 1701)      No acute cardiopulmonary disease                    Workstation performed: [id number]               Procedures  ECG 12 Lead Documentation Only    Date/Time: 1/24/2022 4:41 PM  Performed by: Jacquie Salazar MD  Authorized by: Jacquie Salazar MD     ECG reviewed by me, the ED Provider: yes    Patient location:  ED  Previous ECG:     Previous ECG:  Compared to current    Similarity:  Changes noted    Comparison to cardiac monitor: Yes    Interpretation:     Interpretation: abnormal    Rate:     ECG rate assessment: normal    Rhythm:     Rhythm: sinus rhythm    Ectopy: Ectopy: none    QRS:     QRS axis:  Normal    QRS intervals:  Normal  Conduction:     Conduction: normal    T waves:     T waves: inverted      Inverted:  III          ED Course                                       MDM  Number of Diagnoses or Management Options     Amount and/or Complexity of Data Reviewed  Clinical lab tests: ordered and reviewed  Tests in the radiology section of CPT®: ordered and reviewed  Tests in the medicine section of CPT®: ordered and reviewed  Independent visualization of images, tracings, or specimens: yes    Risk of Complications, Morbidity, and/or Mortality  Presenting problems: moderate  Diagnostic procedures: moderate  Management options: moderate  General comments: Patient tested positive for RSV  Imaging and labs otherwise unremarkable  Presented with SpO2s in the low 90s, became hypoxic during ambulatory pulse oxymetry with SpO2 at 87%  Patient will be admitted under observation status to the hospitalist service  Patient Progress  Patient progress: stable      Disposition  Final diagnoses:   Hypoxia   SOB (shortness of breath)   RSV (acute bronchiolitis due to respiratory syncytial virus)     Time reflects when diagnosis was documented in both MDM as applicable and the Disposition within this note     Time User Action Codes Description Comment    1/24/2022  7:24 PM Sabrina Reeves[address] [R09 02] Hypoxia     1/24/2022  7:24 PM Judith[address] [R06 02] SOB (shortness of breath)     1/24/2022  7:25 PM [place] Jagdish[address] [J21 0] RSV (acute bronchiolitis due to respiratory syncytial virus)       ED Disposition     ED Disposition Condition Date/Time Comment    Admit Stable Mon Jan 24, 2022  7:24 PM Case was discussed with Dr Lieutenant Whitt and the patient's admission status was agreed to be Admission Status: observation status to the service of Dr Lieutenant Whitt           Follow-up Information    None         Patient's Medications   Discharge Prescriptions    No medications on file     No discharge procedures on file  PDMP Review     None           ED Provider  Attending physically available and evaluated Maranda Motrue BUTTERFIELD managed the patient along with the ED Attending      Electronically Signed by         Janee Saravia MD  01/24/22 3577

## 2022-01-24 NOTE — ED ATTENDING ATTESTATION
1/24/2022  I, Cliff Enriquez MD, saw and evaluated the patient  I have discussed the patient with the resident/non-physician practitioner and agree with the resident's/non-physician practitioner's findings, Plan of Care, and MDM as documented in the resident's/non-physician practitioner's note, except where noted  All available labs and Radiology studies were reviewed  I was present for key portions of any procedure(s) performed by the resident/non-physician practitioner and I was immediately available to provide assistance  At this point I agree with the current assessment done in the Emergency Department  I have conducted an independent evaluation of this patient a history and physical is as follows:  Cough, congestion  X-ray unremarkable  RSV test positive  Patient saturation 91 percent at rest, unfortunately dropped to 87 percent with minimal ambulation  Does not wear oxygen at home  Due to advanced age, hypoxia with minimal exertion, will admit to hospitalist service  Patient placed on 2 liters of oxygen, saturations improved  Additional laboratory studies unremarkable      ED Course         Critical Care Time  Procedures

## 2022-01-25 VITALS
BODY MASS INDEX: 31.13 KG/M2 | HEIGHT: 64 IN | WEIGHT: 182.32 LBS | TEMPERATURE: 98.3 F | OXYGEN SATURATION: 92 % | SYSTOLIC BLOOD PRESSURE: 157 MMHG | DIASTOLIC BLOOD PRESSURE: 85 MMHG | HEART RATE: 85 BPM | RESPIRATION RATE: 18 BRPM

## 2022-01-25 PROBLEM — J96.00 ACUTE RESPIRATORY FAILURE (HCC): Status: RESOLVED | Noted: 2022-01-24 | Resolved: 2022-01-25

## 2022-01-25 LAB
ATRIAL RATE: 81 BPM
P AXIS: 57 DEGREES
PR INTERVAL: 148 MS
QRS AXIS: -23 DEGREES
QRSD INTERVAL: 78 MS
QT INTERVAL: 356 MS
QTC INTERVAL: 413 MS
T WAVE AXIS: 24 DEGREES
VENTRICULAR RATE: 81 BPM

## 2022-01-25 PROCEDURE — 93010 ELECTROCARDIOGRAM REPORT: CPT | Performed by: INTERNAL MEDICINE

## 2022-01-25 PROCEDURE — 99217 PR OBSERVATION CARE DISCHARGE MANAGEMENT: CPT | Performed by: INTERNAL MEDICINE

## 2022-01-25 RX ADMIN — ENOXAPARIN SODIUM 40 MG: 40 INJECTION SUBCUTANEOUS at 09:11

## 2022-01-25 RX ADMIN — LABETALOL HYDROCHLORIDE 10 MG: 5 INJECTION, SOLUTION INTRAVENOUS at 04:43

## 2022-01-25 RX ADMIN — PROPRANOLOL HYDROCHLORIDE 10 MG: 20 TABLET ORAL at 13:59

## 2022-01-25 RX ADMIN — LORATADINE 10 MG: 10 TABLET ORAL at 09:09

## 2022-01-25 RX ADMIN — LISINOPRIL 10 MG: 10 TABLET ORAL at 09:09

## 2022-01-25 RX ADMIN — GABAPENTIN 300 MG: 300 CAPSULE ORAL at 09:09

## 2022-01-25 RX ADMIN — SERTRALINE 25 MG: 25 TABLET, FILM COATED ORAL at 09:09

## 2022-01-25 RX ADMIN — GABAPENTIN 300 MG: 300 CAPSULE ORAL at 00:05

## 2022-01-25 RX ADMIN — Medication 6 MG: at 00:05

## 2022-01-25 RX ADMIN — PROPRANOLOL HYDROCHLORIDE 10 MG: 20 TABLET ORAL at 00:05

## 2022-01-25 RX ADMIN — PROPRANOLOL HYDROCHLORIDE 10 MG: 20 TABLET ORAL at 09:09

## 2022-01-25 RX ADMIN — PANTOPRAZOLE SODIUM 40 MG: 40 TABLET, DELAYED RELEASE ORAL at 06:18

## 2022-01-25 RX ADMIN — LEVOTHYROXINE SODIUM 25 MCG: 25 TABLET ORAL at 06:18

## 2022-01-25 RX ADMIN — AMLODIPINE BESYLATE 2.5 MG: 2.5 TABLET ORAL at 09:09

## 2022-01-25 NOTE — UTILIZATION REVIEW
Initial Clinical Review    Admission: Date/Time/Statement:  1/24/22 1925 Observation   Admission Orders (From admission, onward)     Ordered        01/24/22 1925  Place in Observation  Once                      Orders Placed This Encounter   Procedures    Place in Observation     Standing Status:   Standing     Number of Occurrences:   1     Order Specific Question:   Level of Care     Answer:   Med Surg [16]     ED Arrival Information     Expected Arrival Acuity    - 1/24/2022 15:55 Less Urgent         Means of arrival Escorted by Service Admission type    Ambulance [facility] General Medicine Urgent         Arrival complaint    Hypertension        Chief Complaint   Patient presents with    Shortness of Breath     Pt arrives via EMS from nursing home, c/o cough, running nose and a headache the last few days  AS per EMS tablemate has RSV and nursing home requesting she gets tested for it  Covid negative at nursing home  Initial Presentation:  this is a 80year old female from assisted living to ED via ems admitted to observation due to RSV/acute respiratory failure  Presented due to headache, congestion, runny nose and productive cough starting 4 days prior to arrival   Day of arrival dizzy  On exam: trace bilateral LE edema  Right and left hand deformity (arthritis)  Tenderness over lateral malleolus  Anxious  RSV +  With ambulation sat to 87% room air  Plan is monitor oxygen requirements  Wean oxygen as able  Continue home antihypertensives and add labetalol as needed         ED Triage Vitals   Temperature Pulse Respirations Blood Pressure SpO2   01/24/22 1608 01/24/22 1608 01/24/22 1608 01/24/22 1608 01/24/22 1600   98 6 °F (37 °C) 85 20 (!) 171/96 91 %      Temp Source Heart Rate Source Patient Position - Orthostatic VS BP Location FiO2 (%)   01/24/22 1608 01/24/22 1608 01/25/22 0746 01/24/22 1921 --   Oral Monitor Lying Left arm       Pain Score       01/24/22 1608       No Pain Wt Readings from Last 1 Encounters:   01/25/22 82 7 kg (182 lb 5 1 oz)     Additional Vital Signs:   01/25/22 08:38:03 98 3 °F (36 8 °C) 73 18 152/83 106 92 % -- -- -- --   01/25/22 0746 -- 76 18 180/89 Abnormal  -- 93 % -- -- None (Room air) Lying   01/25/22 0618 -- 75 20 176/97 Abnormal  -- 92 % -- -- None (Room air) --   01/25/22 0430 -- 76 20 190/83 Abnormal  -- 92 % -- -- -- --   01/24/22 2356 -- 73 18 192/88 Abnormal  -- 95 % -- -- -- --   01/24/22 1921 -- 77 18 181/94 Abnormal  129 95 % 28 2 L/min Nasal cannula --   01/24/22 1920 -- -- -- -- -- 87 % Abnormal  -- -- None (Room air) --   01/24/22 1615 -- 82 18 166/91 122 92 % -- -- None (Room air) --       Pertinent Labs/Diagnostic Test Results:   1/24/22 CxR - No acute cardiopulmonary disease       12/24/22 ecg Interpretation: abnormal    Rate:     ECG rate assessment: normal    Rhythm:     Rhythm: sinus rhythm    Ectopy:     Ectopy: none    QRS:     QRS axis:  Normal    QRS intervals:  Normal  Conduction:     Conduction: normal    T waves:     T waves: inverted      Inverted:  III     Results from last 7 days   Lab Units 01/24/22  1719   SARS-COV-2  Negative     Results from last 7 days   Lab Units 01/24/22  1719   WBC Thousand/uL 7 19   HEMOGLOBIN g/dL 12 7   HEMATOCRIT % 38 7   PLATELETS Thousands/uL 231   NEUTROS ABS Thousands/µL 5 20     Results from last 7 days   Lab Units 01/24/22  1719   SODIUM mmol/L 140   POTASSIUM mmol/L 4 3   CHLORIDE mmol/L 104   CO2 mmol/L 29   ANION GAP mmol/L 7   BUN mg/dL 22   CREATININE mg/dL 0 86   EGFR ml/min/1 73sq m 59   CALCIUM mg/dL 8 7     Results from last 7 days   Lab Units 01/24/22  1719   AST U/L 22   ALT U/L 16   ALK PHOS U/L 95   TOTAL PROTEIN g/dL 7 0   ALBUMIN g/dL 3 4*   TOTAL BILIRUBIN mg/dL 0 23     Results from last 7 days   Lab Units 01/24/22  1719   GLUCOSE RANDOM mg/dL 112     Results from last 7 days   Lab Units 01/24/22  1719   HS TNI 0HR ng/L 6     Results from last 7 days   Lab Units 01/24/22  1719 INFLUENZA A PCR  Negative   INFLUENZA B PCR  Negative   RSV PCR  Positive*     ED Treatment:   Medication Administration from 01/24/2022 1555 to 01/25/2022 3247       Date/Time Order Dose Route Action Comments     01/25/2022 0005 gabapentin (NEURONTIN) capsule 300 mg 300 mg Oral Given      01/25/2022 5614 levothyroxine tablet 25 mcg 25 mcg Oral Given      01/25/2022 0005 melatonin tablet 6 mg 6 mg Oral Given      01/25/2022 0618 pantoprazole (PROTONIX) EC tablet 40 mg 40 mg Oral Given      01/25/2022 0005 propranolol (INDERAL) tablet 10 mg 10 mg Oral Given      01/25/2022 0443 Labetalol HCl (NORMODYNE) injection 10 mg 10 mg Intravenous Given         Past Medical History:   Diagnosis Date    Abnormal gait     Anxiety     Arthritis     DJD (degenerative joint disease)     GERD (gastroesophageal reflux disease)     HTN (hypertension)     Hypertension     Hypothyroidism     Osteoporosis      Present on Admission:  **None**      Admitting Diagnosis: RSV (acute bronchiolitis due to respiratory syncytial virus) [J21 0]  SOB (shortness of breath) [R06 02]  Hypoxia [R09 02]  Age/Sex: 80 y o  female  Admission Orders:  Scheduled Medications:  amLODIPine, 2 5 mg, Oral, Daily  enoxaparin, 40 mg, Subcutaneous, Daily  gabapentin, 300 mg, Oral, TID  levothyroxine, 25 mcg, Oral, Early Morning  lisinopril, 10 mg, Oral, Daily  loratadine, 10 mg, Oral, Daily  melatonin, 6 mg, Oral, HS  pantoprazole, 40 mg, Oral, Early Morning  propranolol, 10 mg, Oral, TID  sertraline, 25 mg, Oral, Daily      Continuous IV Infusions: none      PRN Meds:  acetaminophen, 650 mg, Oral, Q6H PRN  Diclofenac Sodium, 2 g, Topical, BID PRN  Labetalol HCl, 10 mg, Intravenous, Q4H PRN - used x 1 1/25  lidocaine, , Topical, Daily PRN    Oxygen 2 liters for sat > 92%      Network Utilization Review Department  ATTENTION: Please call with any questions or concerns to [phone] and carefully listen to the prompts so that you are directed to the right person  All voicemails are confidential   Lani Fernando all requests for admission clinical reviews, approved or denied determinations and any other requests to dedicated fax number below belonging to the campus where the patient is receiving treatment   List of dedicated fax numbers for the Facilities:  [address] DENIALS (Administrative/Medical Necessity) [phone]   [address] (Maternity/NICU/Pediatrics) [phone]   [address]  [address] [address] [facility] [place] [address] [address] [place] [address] Dileep [address]ro [address] [facility] [place] [address] [phone]

## 2022-01-25 NOTE — H&P
[address] [facility] 8/25/1930, 80 y o  female MRN: [id number]  Unit/Bed#: ED 14 Encounter: [id number]  Primary Care Provider: Nat Lucas MD   Date and time admitted to hospital: 1/24/2022  3:56 PM    * RSV (respiratory syncytial virus infection)  Assessment & Plan  Presents from a nursing facility where another resident that she eats with was positive for RSV  Reports of SOB and a cough with productive clear sputum since last Friday  RSV positive on 01/24/2022  No leukocytosis, fever, or chills  CXR negative for acute cardiopulmonary pathology  Was initially on room air, but it is saturated to 87% in the ED and was placed on 2 L of oxygen via nasal cannula  Currently, she is on 1 L of oxygen via nasal cannula saturating at 95%  Plan:  Treat via supportive care  Continue to monitor oxygen requirements  Acute respiratory failure ([facility] [address] )  Assessment & Plan  No history of home oxygen use  Etiology:  Likely 2/2 RC infection  Less likely d/t cardiac etiology  · Echo 3/14/21: EF 60%  No regional wall motion abnormalities  · NT-BNP 10/6/21 = elevated at 570  CXR 1/24/22: Negative for acute cardiopulmonary pathology  Was previously saturating 91% on room air, but desaturated to 87% in the ED upon ambulation  Then placed on 2 L of oxygen via nasal cannula saturating 95%  On physical exam, did not appear volume overloaded  Exhibited bilateral lower extremity edema, but patient states this is chronic  Currently on 1 L of oxygen via nasal cannula  Plan:  Continue to monitor oxygen requirements and wean as necessary  HTN (hypertension)  Assessment & Plan  Home regimen:  Amlodipine 2 5 mg daily, lisinopril 10 mg daily, propranolol 10 mg TID  Reports compliance with all medication  States that systolic BP normally runs in the 140s  BP was elevated on arrival 171/96  Continues to have elevated blood pressure and is now 181/94    Currently denies any headache, lightheadedness or dizziness, nausea, or vomiting  Plan:  Labetalol 10 mg Q4hr PRN  Continue to monitor BP  Bilateral lower extremity edema  Assessment & Plan  Exhibits bilateral lower extremity edema on admission  However, patient states this is chronic  No history of DVT or PE in the past   Prior medical records from March 2021, patient was trialed on diuretics, but no significant improvement noted  Last VAS U/S on 3/25/21: Negative for DVT  Plan:  Ordered knee-high compression stockings  Keep legs elevated on bed  Hypothyroidism  Assessment & Plan  Home regimen:  Levothyroxine 25 mcg daily  Plan:  Continue levothyroxine  VTE Pharmacologic Prophylaxis: VTE Score: 6 High Risk (Score >/= 5) - Pharmacological DVT Prophylaxis Ordered: enoxaparin (Lovenox)  Sequential Compression Devices Ordered  Code Status: Level 3 - DNAR and DNI   Discussion with family: Attempted to update  (daughter) via phone  Unable to contact  Anticipated Length of Stay: Patient will be admitted on an observation basis with an anticipated length of stay of less than 2 midnights secondary to RSV  Chief Complaint: Cough and shortness of breath    History of Present Illness:  Deyanne Baumgarten is a pleasant  80 y o  female with a PMHx of HTN, hypothyroidism, GERD, anxiety, and arthritis who presents with shortness of breath and cough  The patient lives at a nursing facility and says that she has been feeling sick ever since last Friday  She reports of a cough with clear sputum throughout the day that comes on abruptly  She also endorses of feeling short of breath and having a headache  Prior to coming to the hospital, she felt lightheaded and dizzy, but that has now resolved since being here  She says that she received 2 shots of the COVID vaccine, but is still waiting for the booster  She denies any chest pain, nausea, vomiting, nor any abdominal pain    She says that she does not use any home oxygen  She denies any history of blood clots, DVT, or PE  She says that she is compliant with all of her medications and she has an appointment to see a cardiologist in February  Review of Systems:  Review of Systems   Constitutional: Positive for fatigue  Negative for appetite change and chills  HENT: Positive for congestion and rhinorrhea  Negative for trouble swallowing  Eyes: Negative for photophobia and visual disturbance  Respiratory: Positive for cough (productive with clear sputum) and shortness of breath (at rest and worse with ambulation)  Negative for wheezing  Cardiovascular: Positive for leg swelling (chronic)  Negative for chest pain and palpitations  Gastrointestinal: Negative for abdominal distention, abdominal pain, nausea and vomiting  Genitourinary: Negative for dysuria  Neurological: Positive for weakness  Negative for dizziness and light-headedness  Past Medical and Surgical History:   Past Medical History:   Diagnosis Date    Abnormal gait     Anxiety     Arthritis     DJD (degenerative joint disease)     GERD (gastroesophageal reflux disease)     HTN (hypertension)     Hypertension     Hypothyroidism     Osteoporosis        History reviewed  No pertinent surgical history  Meds/Allergies:  Prior to Admission medications    Medication Sig Start Date End Date Taking?  Authorizing Provider   acetaminophen (TYLENOL) 325 mg tablet Take 3 tablets (975 mg total) by mouth every 8 (eight) hours 3/27/21   Alka Foreman PA-C   ALPRAZolam Earleen Bump) 0 25 mg tablet Take by mouth daily at bedtime as needed for anxiety    Historical Provider, MD   amLODIPine (NORVASC) 2 5 mg tablet Take 2 5 mg by mouth daily 12/2/21   Historical Provider, MD   Brimonidine Tartrate-Timolol (COMBIGAN OP) Apply to eye    Historical Provider, MD   Calcium 250 MG CAPS Take 500 mg by mouth    Historical Provider, MD   cycloSPORINE (RESTASIS) 0 05 % ophthalmic emulsion 1 drop 2 (two) times a day    Historical Provider, MD   Diclofenac Sodium (VOLTAREN) 1 % Apply 2 g topically 2 (two) times a day as needed (Pain) 5/11/21   Ochoa Seay DO   gabapentin (NEURONTIN) 300 mg capsule Take 300 mg by mouth 3 (three) times a day    Historical Provider, MD   levothyroxine 25 mcg tablet Take 25 mcg by mouth daily    Historical Provider, MD   lidocaine (XYLOCAINE) 2 % topical gel Apply topically as needed for mild pain 5/11/21   Ochoa Seay DO   lisinopril (ZESTRIL) 10 mg tablet Take 10 mg by mouth daily    Historical Provider, MD   loratadine (CLARITIN) 10 mg tablet Take 10 mg by mouth daily    Historical Provider, MD   Melatonin 10 MG TABS Take 10 mg by mouth    Historical Provider, MD   meloxicam (MOBIC) 15 mg tablet Take 15 mg by mouth daily    Historical Provider, MD   omeprazole (PriLOSEC) 20 mg delayed release capsule Take 20 mg by mouth daily    Historical Provider, MD   propranolol (INDERAL) 10 mg tablet Take 10 mg by mouth 3 (three) times a day    Historical Provider, MD   sertraline (ZOLOFT) 25 mg tablet Take 25 mg by mouth daily    Historical Provider, MD   Tafluprost (Alma Delia French OP) Apply to eye    Historical Provider, MD     I have reviewed home medications with patient personally  Allergies: Allergies   Allergen Reactions    Iodinated Diagnostic Agents Hives    Iodine - Food Allergy Other (See Comments)     shellfish and test dye    Nsaids GI Intolerance    Shellfish-Derived Products - Food Allergy Hives       Social History:  Marital Status:     Occupation: Unknown  Patient Pre-hospital Living Situation: Assisted Living  Patient Pre-hospital Level of Mobility: unable to be assessed at time of evaluation  Patient Pre-hospital Diet Restrictions: None  Substance Use History:   Social History     Substance and Sexual Activity   Alcohol Use Never     Social History     Tobacco Use   Smoking Status Never Smoker   Smokeless Tobacco Never Used     Social History     Substance and Sexual Activity   Drug Use Never       Family History:  History reviewed  No pertinent family history  Physical Exam:     Vitals:   Blood Pressure: (!) 181/94 (01/24/22 1921)  Pulse: 77 (01/24/22 1921)  Temperature: 98 6 °F (37 °C) (01/24/22 1608)  Temp Source: Oral (01/24/22 1608)  Respirations: 18 (01/24/22 1921)  SpO2: 95 % (01/24/22 1921)    Physical Exam  Vitals and nursing note reviewed  Constitutional:       General: She is not in acute distress  Appearance: Normal appearance  She is not ill-appearing or diaphoretic  HENT:      Head: Normocephalic and atraumatic  Mouth/Throat:      Mouth: Mucous membranes are moist    Eyes:      Conjunctiva/sclera: Conjunctivae normal       Comments: Slight erythema around eyes - appears to be dry   Cardiovascular:      Rate and Rhythm: Normal rate and regular rhythm  Pulses: Normal pulses  Heart sounds: Normal heart sounds  No murmur heard  Pulmonary:      Effort: Pulmonary effort is normal  No respiratory distress  Breath sounds: Normal breath sounds  No wheezing or rales  Comments: On 1L via NC saturating at 94%  Chest:      Chest wall: No tenderness  Abdominal:      General: Bowel sounds are normal  There is no distension  Palpations: Abdomen is soft  Tenderness: There is no abdominal tenderness  There is no guarding or rebound  Musculoskeletal:         General: No tenderness  Right lower leg: Edema (1+ pitting edema) present  Left lower leg: Edema (1+ pitting edema) present  Skin:     General: Skin is warm  Neurological:      General: No focal deficit present  Mental Status: She is alert and oriented to person, place, and time  Psychiatric:         Mood and Affect: Mood normal          Behavior: Behavior normal          Thought Content:  Thought content normal          Judgment: Judgment normal           Additional Data:     Lab Results:  Results from last 7 days   Lab Units 01/24/22  6519   WBC Thousand/uL 7 19   HEMOGLOBIN g/dL 12 7   HEMATOCRIT % 38 7   PLATELETS Thousands/uL 231   NEUTROS PCT % 72   LYMPHS PCT % 14   MONOS PCT % 10   EOS PCT % 3     Results from last 7 days   Lab Units 01/24/22  1719   SODIUM mmol/L 140   POTASSIUM mmol/L 4 3   CHLORIDE mmol/L 104   CO2 mmol/L 29   BUN mg/dL 22   CREATININE mg/dL 0 86   ANION GAP mmol/L 7   CALCIUM mg/dL 8 7   ALBUMIN g/dL 3 4*   TOTAL BILIRUBIN mg/dL 0 23   ALK PHOS U/L 95   ALT U/L 16   AST U/L 22   GLUCOSE RANDOM mg/dL 112                       Imaging: Reviewed radiology reports from this admission including: chest xray and Personally reviewed the following imaging: chest xray  XR chest 1 view portable   Final Result by Jose Manuel Small MD (01/24 1701)      No acute cardiopulmonary disease  Workstation performed: [id number]             EKG and Other Studies Reviewed on Admission:   · EKG: NSR  HR 81 bpm     ** Please Note: This note has been constructed using a voice recognition system   **

## 2022-01-25 NOTE — ASSESSMENT & PLAN NOTE
No history of home oxygen use  Etiology:  Likely 2/2 RC infection  Was previously saturating 91% on room air, but desaturated to 87% in the ED upon ambulation  Then placed on 2 L of oxygen via nasal cannula saturating 95%  On physical exam, did not appear volume overloaded  Exhibited bilateral lower extremity edema, but patient states this is chronic  Currently on 1 L of oxygen via nasal cannula  Plan:  Continue to monitor oxygen requirements and wean as necessary

## 2022-01-25 NOTE — DISCHARGE INSTR - AVS FIRST PAGE
Dear Maggie Gold,     It was our pleasure to care for you here at [facility]  It is our hope that we were always able to exceed the expected standards for your care during your stay  You were hospitalized due to respiratory syncytial virus infection  You were cared for on the [facility] 4th floor by Arlen Burkett MD under the service of Reynaldo Hidalgo MD with the [facility] who covers for your primary care physician (PCP), Jose Oviedo MD, while you were hospitalized  If you have any questions or concerns related to this hospitalization, you may contact us at [phone]  For follow up as well as any medication refills, we recommend that you follow up with your primary care physician  A registered nurse will reach out to you by phone within a few days after your discharge to answer any additional questions that you may have after going home  However, at this time we provide for you here, the most important instructions / recommendations at discharge:     Notable Medication Adjustments -   None  Testing Required after Discharge -   None  Important follow up information -   Schedule a follow-up are with her PCP to be seen in 1 week  Other Instructions -   Continue to use compression stockings at home to aid with lower extremity swelling  Please review this entire after visit summary as additional general instructions including medication list, appointments, activity, diet, any pertinent wound care, and other additional recommendations from your care team that may be provided for you        Sincerely,     Arlen Burkett MD

## 2022-01-25 NOTE — ASSESSMENT & PLAN NOTE
Presents from a nursing facility where another resident that she eats with was positive for RSV  Reports of SOB and a cough with productive clear sputum since last Friday  RSV positive on 01/24/2022  No leukocytosis, fever, or chills  CXR negative for acute cardiopulmonary pathology  Was initially on room air, but it is saturated to 87% in the ED and was placed on 2 L of oxygen via nasal cannula  Currently, she is on 1 L of oxygen via nasal cannula saturating at 95%  Plan:  Treat via supportive care  Continue to monitor oxygen requirements

## 2022-01-25 NOTE — CASE MANAGEMENT
Case Management Discharge Planning Note    Patient name Concha Frye  Location W MS 65/W -87 MRN [id number]  : 1930 Date 2022       Current Admission Date: 2022  Current Admission Diagnosis:RSV (respiratory syncytial virus infection)   Patient Active Problem List    Diagnosis Date Noted    RSV (respiratory syncytial virus infection) 2022    Acute respiratory failure ([address] ) 2022    Hypothyroidism 2022    Bilateral lower extremity edema 2022    Chronic pain of right knee 06/15/2021    Primary osteoarthritis of right knee 06/15/2021    Closed fracture of upper end of right fibula 2021    Nondisplaced fracture of right fibula 2021    Bilateral lower extremity pain 2021    Ambulatory dysfunction 2021    HTN (hypertension) 2021    Glaucoma 2021    Polyneuropathy 2021    Anxiety 2021      LOS (days): 0  Geometric Mean LOS (GMLOS) (days):   Days to GMLOS:     OBJECTIVE:            Current admission status: Observation   Preferred Pharmacy:   [facility] [address] [place], [place] [address]  Phone: [phone] Fax: [phone]    Primary Care Provider: Filomena Menchaca MD    Primary Insurance: [address] [place] [address]  Secondary Insurance:     DISCHARGE DETAILS:    Discharge planning discussed with[de-identified] patient  Freedom of Choice: Yes     CM contacted family/caregiver?: Yes  Were Treatment Team discharge recommendations reviewed with patient/caregiver?: Yes  Did patient/caregiver verbalize understanding of patient care needs?: Yes  Were patient/caregiver advised of the risks associated with not following Treatment Team discharge recommendations?: Yes    Contacts  Patient Contacts: Braydon-son (left voice message)  Relationship to Patient[de-identified] Family  Contact Method: Phone  Phone Number: [phone]  Reason/Outcome: Continuity of [address]         Is the patient interested in [address] at discharge?: No    DME Referral Provided  Referral made for DME?: No    Other Referral/Resources/Interventions Provided:  Interventions: Facility Return  Referral Comments: CM spoke with Ashley Kruse from [address] [place] [address] able to return to facility         Treatment Team Recommendation: [address]  Discharge Destination Plan[de-identified] [address] Return  Transport at Discharge : Wheelchair van  Dispatcher Contacted: Yes  Number/Name of Dispatcher: [facility]  Transported by [employer] and Unit #): [place]  ETA of Transport (Date): 01/25/22  ETA of Transport (Time): 1400     CM spoke with Alisha-med tech at [employer], [phone]  CM introduced self and role  Ashley Kruse updated patient is medically stable for discharge today  Covid negative  Patient confirmed RSV  Ashley Kruse provided fax number for clinicals   CM spoke with patient at the bedside  CM introduced self and role  Patient aware she is medically stable to return to facility  CM confirmed with patient that she is able to stand and pivot to a wheelchair  Patient requesting CM set up transport  CM offered to f/u with family  Patient stated she has been updating her son Connie Casarez on the phone  CM left voice message for son Connie Casarez at   Waiting for callback  CM sent referral to [facility]  [facility]  Nursing updated

## 2022-01-25 NOTE — ASSESSMENT & PLAN NOTE
Home regimen:  Amlodipine 2 5 mg daily, lisinopril 10 mg daily, propranolol 10 mg TID  Reports compliance with all medication  States that systolic BP normally runs in the 140s  BP was elevated on arrival 171/96  Continues to have elevated blood pressure and is now 181/94  Currently denies any headache, lightheadedness or dizziness, nausea, or vomiting      Plan:  Continue home regimen  Continue to monitor BP at home

## 2022-01-25 NOTE — ASSESSMENT & PLAN NOTE
No history of home oxygen use  Etiology:  Likely 2/2 RC infection  Less likely d/t cardiac etiology  · Echo 3/14/21: EF 60%  No regional wall motion abnormalities  · NT-BNP 10/6/21 = elevated at 570  CXR 1/24/22: Negative for acute cardiopulmonary pathology  Was previously saturating 91% on room air, but desaturated to 87% in the ED upon ambulation  Then placed on 2 L of oxygen via nasal cannula saturating 95%  On physical exam, did not appear volume overloaded  Exhibited bilateral lower extremity edema, but patient states this is chronic  Currently on room air  Plan:    Continue to monitor oxygen requirements and wean as necessary

## 2022-01-25 NOTE — DISCHARGE SUMMARY
[facility]  Discharge- Latoya Cat 8/25/1930, 80 y o  female MRN: [id number]  Unit/Bed#: W -01 Encounter: [id number]  Primary Care Provider: Raoul Moya MD   Date and time admitted to hospital: 1/24/2022  3:56 PM    * RSV (respiratory syncytial virus infection)  Assessment & Plan  Presents from a nursing facility where another resident that she eats with was positive for RSV  Reports of SOB and a cough with productive clear sputum since last Friday  RSV positive on 01/24/2022  No leukocytosis, fever, or chills  CXR negative for acute cardiopulmonary pathology  Was initially on room air, but it is saturated to 87% in the ED and was placed on 2 L of oxygen via nasal cannula  Currently, on room air  Patient denied coughing, lightheadedness  Patient reported slight shortness of breath with exertion  Plan:  Treat via supportive care  Follow-up with PCP within 1 week  If needed for cough, can try OTC guaifenesin  Bilateral lower extremity edema  Assessment & Plan  Exhibits bilateral lower extremity edema on admission  However, patient states this is chronic  No history of DVT or PE in the past   Prior medical records from March 2021, patient was trialed on diuretics, but no significant improvement noted  Last VAS U/S on 3/25/21: Negative for DVT  Plan:  Continue knee-high compression stockings at home  Keep legs elevated on bed and when seated  Hypothyroidism  Assessment & Plan  Home regimen:  Levothyroxine 25 mcg daily  Plan:    Continue levothyroxine  HTN (hypertension)  Assessment & Plan  Home regimen:  Amlodipine 2 5 mg daily, lisinopril 10 mg daily, propranolol 10 mg TID  Reports compliance with all medication  States that systolic BP normally runs in the 140s  BP was elevated on arrival 171/96  Continues to have elevated blood pressure and is now 181/94    Currently denies any headache, lightheadedness or dizziness, nausea, or vomiting  Plan:  Continue home regimen  Continue to monitor BP at home    Acute respiratory failure (HCC)-resolved as of 1/25/2022  Assessment & Plan  No history of home oxygen use  Etiology:  Likely 2/2 RC infection  Less likely d/t cardiac etiology  · Echo 3/14/21: EF 60%  No regional wall motion abnormalities  · NT-BNP 10/6/21 = elevated at 570  CXR 1/24/22: Negative for acute cardiopulmonary pathology  Was previously saturating 91% on room air, but desaturated to 87% in the ED upon ambulation  Then placed on 2 L of oxygen via nasal cannula saturating 95%  On physical exam, did not appear volume overloaded  Exhibited bilateral lower extremity edema, but patient states this is chronic  Currently on room air  Plan:    Continue to monitor oxygen requirements and wean as necessary  Discharging Resident: Pedrito Rodriguez MD  Attending: Josué Jean MD  PCP: Shady Willett MD  Admission Date: 1/24/2022  Discharge Date: 01/25/22    Disposition:      Other: [facility] assisted living     For Discharges to [facility] SNF:   · Not Applicable to this Patient - Not Applicable to this Patient    Reason for Admission:  Shortness of breath and cough secondary to RSV infection    Consultations During Hospital Stay:  · None    Procedures Performed:     · None    Medication Adjustments and Discharge Medications:  · Summary of Medication Adjustments made as a result of this hospitalization:  None  · Medication Dosing Tapers - Please refer to Discharge Medication List for details on any medication dosing tapers (if applicable to patient)  · Medications being temporarily held (include recommended restart time):  None  · Discharge Medication List: See after visit summary for reconciled discharge medications  Wound Care Recommendations:  When applicable, please see wound care section of After Visit Summary      Diet Recommendations at Discharge:  Diet -        Diet Orders   (From admission, onward)             Start     Ordered    01/25/22 0942  Room Service  Once        Question:  Type of Service  Answer:  Room Service - Appropriate with Assistance    01/25/22 0941    01/24/22 2138  Diet Regular; Regular House; Sodium 2 GM  Diet effective now        References:    Nutrtion Support Algorithm Enteral vs  Parenteral   Question Answer Comment   Diet Type Regular    Regular Regular House    Other Restriction(s): Sodium 2 GM    RD to adjust diet per protocol? Yes        01/24/22 2137              Fluid Restriction - No Fluid Restriction at Discharge  Instructions for any Catheters / Lines Present at Discharge (including removal date, if applicable):  None    Significant Findings / Test Results:     XR chest 1 view portable    Result Date: 1/24/2022  Impression: No acute cardiopulmonary disease  Workstation performed: [id number]       XR chest 1 view portable    Result Date: 1/24/2022  Impression No acute cardiopulmonary disease  Workstation performed: [id number]   ·    ·     Incidental Findings:   · None     Test Results Pending at Discharge (will require follow up): · None     Outpatient Tests Requested:  · None    Complications:  None    Hospital Course: Johan Barnes is a 80 y o  female patient who originally presented to the hospital on 1/24/2022 due to shortness of breath and cough secondary to RSV infection  On admission patient was positive for RSV and was requiring up to 2 L of oxygen  Patient was managed supportively and was able to be weaned off oxygen down to room air  On evaluation, patient denied cough, and lightheadedness  Patient did report mild shortness of breath with exertion  Patient's lab work including cell count and chemistry were within normal limits  Patient was stable and was cleared for discharge  Condition at Discharge: stable     Discharge Day Visit / Exam:     Subjective:  Nursing team reported no overnight events and patient was on 2 L overnight    In the morning  Patient evaluated was found on room air satting at 93%  Patient reported mild shortness of breath, feeling tired wishes she could not rest in the ED, I headache which before seems to come and go usually, and chronic neuropathy of left lower extremity  Patient denied fevers, cough, chest pain, abdominal pain, nausea/vomiting, and reported voiding urine appropriately  Vitals: Blood Pressure: 157/85 (01/25/22 1350)  Pulse: 85 (01/25/22 1350)  Temperature: 98 3 °F (36 8 °C) (01/25/22 0838)  Temp Source: Oral (01/24/22 1608)  Respirations: 18 (01/25/22 0838)  Height: 5' 4" (162 6 cm) (01/25/22 0900)  Weight - Scale: 82 7 kg (182 lb 5 1 oz) (01/25/22 0900)  SpO2: 92 % (01/25/22 6993)  Exam:   Physical Exam  Vitals and nursing note reviewed  Constitutional:       General: She is not in acute distress  Appearance: Normal appearance  She is obese  She is not ill-appearing or diaphoretic  HENT:      Head: Normocephalic and atraumatic  Mouth/Throat:      Mouth: Mucous membranes are moist       Pharynx: Oropharynx is clear  Eyes:      General: No scleral icterus  Conjunctiva/sclera: Conjunctivae normal    Cardiovascular:      Rate and Rhythm: Normal rate and regular rhythm  Pulses: Normal pulses  Heart sounds: Normal heart sounds  No murmur heard  No gallop  Pulmonary:      Effort: Pulmonary effort is normal  No respiratory distress  Breath sounds: Normal breath sounds  No wheezing or rales  Abdominal:      General: Bowel sounds are normal  There is no distension  Palpations: Abdomen is soft  There is no mass  Tenderness: There is no abdominal tenderness  Musculoskeletal:         General: Tenderness (Left lower extremity) present  Normal range of motion  Cervical back: Normal range of motion and neck supple  Right lower leg: No edema  Left lower leg: No edema  Skin:     General: Skin is warm and dry        Capillary Refill: Capillary refill takes less than 2 seconds  Coloration: Skin is not jaundiced  Neurological:      General: No focal deficit present  Mental Status: She is alert and oriented to person, place, and time  Mental status is at baseline  Sensory: No sensory deficit  Psychiatric:         Mood and Affect: Mood normal          Behavior: Behavior normal          Thought Content: Thought content normal          Judgment: Judgment normal          Discussion with Family:  Thor Casarez via phone    Goals of Care Discussions:  · Code Status at Discharge: Level 3 - DNAR and DNI  · Were there any Goals of Care Discussions during Hospitalization?: No  · Results of any General Goals of Care Discussions:  N/a   · POLST Completed: No   · If POLST Completed, Summary of POLST Agreement Provided Here:  N/a   · OK to Rehospitalize if Needed? N/a    Discharge instructions/Information to patient and family:   See after visit summary section titled Discharge Instructions for information provided to patient and family        Planned Readmission: None       ** Please Note: This note has been constructed using a voice recognition system **

## 2022-01-25 NOTE — ASSESSMENT & PLAN NOTE
Home regimen:  Amlodipine 2 5 mg daily, lisinopril 10 mg daily, propranolol 10 mg TID  Reports compliance with all medication  States that systolic BP normally runs in the 140s  BP was elevated on arrival 171/96  Continues to have elevated blood pressure and is now 181/94  Currently denies any headache, lightheadedness or dizziness, nausea, or vomiting  Plan:  Labetalol 10 mg Q4hr PRN  Continue to monitor BP

## 2022-01-25 NOTE — ASSESSMENT & PLAN NOTE
Presents from a nursing facility where another resident that she eats with was positive for RSV  Reports of SOB and a cough with productive clear sputum since last Friday  RSV positive on 01/24/2022  No leukocytosis, fever, or chills  CXR negative for acute cardiopulmonary pathology  Was initially on room air, but it is saturated to 87% in the ED and was placed on 2 L of oxygen via nasal cannula  Currently, on room air  Patient denied coughing, lightheadedness  Patient reported slight shortness of breath with exertion  Plan:  Treat via supportive care  Follow-up with PCP within 1 week  If needed for cough, can try OTC guaifenesin

## 2022-01-25 NOTE — ASSESSMENT & PLAN NOTE
Exhibits bilateral lower extremity edema on admission  However, patient states this is chronic  No history of DVT or PE in the past   Prior medical records from March 2021, patient was trialed on diuretics, but no significant improvement noted  Last VAS U/S on 3/25/21: Negative for DVT  Plan:  Continue knee-high compression stockings at home  Keep legs elevated on bed and when seated

## 2022-02-09 ENCOUNTER — OFFICE VISIT (OUTPATIENT)
Dept: CARDIOLOGY CLINIC | Facility: CLINIC | Age: 87
End: 2022-02-09
Payer: COMMERCIAL

## 2022-02-09 VITALS
WEIGHT: 173 LBS | BODY MASS INDEX: 29.53 KG/M2 | SYSTOLIC BLOOD PRESSURE: 140 MMHG | HEART RATE: 74 BPM | DIASTOLIC BLOOD PRESSURE: 75 MMHG | OXYGEN SATURATION: 94 % | HEIGHT: 64 IN

## 2022-02-09 DIAGNOSIS — I10 PRIMARY HYPERTENSION: Primary | ICD-10-CM

## 2022-02-09 DIAGNOSIS — I10 HYPERTENSION, UNSPECIFIED TYPE: ICD-10-CM

## 2022-02-09 PROCEDURE — 99202 OFFICE O/P NEW SF 15 MIN: CPT | Performed by: INTERNAL MEDICINE

## 2022-02-09 RX ORDER — AMLODIPINE BESYLATE AND BENAZEPRIL HYDROCHLORIDE 5; 20 MG/1; MG/1
1 CAPSULE ORAL DAILY
Qty: 90 CAPSULE | Refills: 1 | Status: SHIPPED | OUTPATIENT
Start: 2022-02-09

## 2022-02-09 RX ORDER — DIMENHYDRINATE 50 MG
1 TABLET ORAL 2 TIMES DAILY
COMMUNITY

## 2022-02-09 RX ORDER — LISINOPRIL 20 MG/1
20 TABLET ORAL DAILY
COMMUNITY
Start: 2022-02-02 | End: 2022-02-09 | Stop reason: ALTCHOICE

## 2022-02-09 NOTE — PATIENT INSTRUCTIONS
You were seen today in the Cardiology office for high blood pressure  I have started you on a combination blood pressure pill called amlodipine-benazepril (Lotrel)  This pill has two different blood pressure medications in one capsule  Hopefully, this will simplify your blood pressure regimen  You can take propranolol two times per day or every 12 hours  Thank you for choosing [facility]  Please call our office or use [employer] with any questions

## 2022-02-09 NOTE — PROGRESS NOTES
[facility]    CHIEF COMPLAINT:   Chief Complaint   Patient presents with    New Patient Visit    Hypertension       HPI:  Amy Guerrero is a 80 y o  female with a past medical history of ambulatory dydsfunction and hypertension who presents today for high blood pressure  She was recently sent to the emergency department from her independent living facility for cough and shortness of breath  Found to be RSV positive  Required less than 24 hour stay  Also noted to have high blood pressure on admission  She has been prescribed amlodipine 2 5 mg daily, lisinopril 10 mg and propranolol 10 mg TID  She has been taking her lisinopril as prescribed  She takes 30 mg of propranolol once daily  She does not take amlodipine  She otherwise does not have any acute complaints  She does have some chronic lower extremity swelling  She does complain of some shortness of breath with exertion and she uses a walker  She denies any fever, chills, syncope, chest pain, orthopnea  The following portions of the patient's history were reviewed and updated as appropriate: allergies, current medications, past family history, past medical history, past social history, past surgical history, and problem list     SINCE LAST OV I REVIEWED WITH THE PATIENT THE INTERIM LABS, TEST RESULTS, CONSULTANT(S) NOTES AND PERFORMED AN INTERIM REVIEW OF HISTORY    Past Medical History:   Diagnosis Date    Abnormal gait     Anxiety     Arthritis     DJD (degenerative joint disease)     GERD (gastroesophageal reflux disease)     HTN (hypertension)     Hypertension     Hypothyroidism     Osteoporosis        History reviewed  No pertinent surgical history  Social History     Socioeconomic History    Marital status:       Spouse name: Not on file    Number of children: Not on file    Years of education: Not on file    Highest education level: Not on file   Occupational History    Not on file   Tobacco Use    Smoking status: Never Smoker    Smokeless tobacco: Never Used   Vaping Use    Vaping Use: Never used   Substance and Sexual Activity    Alcohol use: Not Currently    Drug use: Never    Sexual activity: Not on file   Other Topics Concern    Not on file   Social History Narrative    Not on file     Social Determinants of Health     Financial Resource Strain: Not on file   Food Insecurity: Not on file   Transportation Needs: Not on file   Physical Activity: Not on file   Stress: Not on file   Social Connections: Not on file   Intimate Partner Violence: Not on file   Housing Stability: Not on file       History reviewed  No pertinent family history      Allergies   Allergen Reactions    Iodinated Diagnostic Agents Hives    Iodine - Food Allergy Other (See Comments)     shellfish and test dye    Nsaids GI Intolerance    Shellfish-Derived Products - Food Allergy Hives       Current Outpatient Medications   Medication Sig Dispense Refill    acetaminophen (TYLENOL) 325 mg tablet Take 3 tablets (975 mg total) by mouth every 8 (eight) hours  0    cycloSPORINE (RESTASIS) 0 05 % ophthalmic emulsion 1 drop 2 (two) times a day      Flaxseed, Linseed, (Flax Seed Oil) 1000 MG CAPS Take 1 tablet by mouth 2 (two) times a day      gabapentin (NEURONTIN) 300 mg capsule Take 300 mg by mouth 3 (three) times a day      levothyroxine 25 mcg tablet Take 25 mcg by mouth daily      meloxicam (MOBIC) 15 mg tablet Take 15 mg by mouth daily      omeprazole (PriLOSEC) 20 mg delayed release capsule Take 20 mg by mouth daily      propranolol (INDERAL) 10 mg tablet Take 10 mg by mouth 3 (three) times a day      sertraline (ZOLOFT) 25 mg tablet Take 25 mg by mouth daily      ALPRAZolam (XANAX) 0 25 mg tablet Take by mouth daily at bedtime as needed for anxiety (Patient not taking: Reported on 2/9/2022 )      amLODIPine-benazepril (LOTREL 5-20) 5-20 MG per capsule Take 1 capsule by mouth daily 90 capsule 1    Calcium 250 MG CAPS Take 500 mg by mouth (Patient not taking: Reported on 2/9/2022 )      Diclofenac Sodium (VOLTAREN) 1 % Apply 2 g topically 2 (two) times a day as needed (Pain) (Patient not taking: Reported on 2/9/2022 ) 50 g 1     No current facility-administered medications for this visit  /75 (BP Location: Right arm, Patient Position: Sitting, Cuff Size: Standard)   Pulse 74   Ht 5' 4" (1 626 m)   Wt 78 5 kg (173 lb)   SpO2 94%   BMI 29 70 kg/m²     Review of Systems   All other systems reviewed and are negative  Physical Exam  Vitals reviewed  Constitutional:       General: She is not in acute distress  Appearance: Normal appearance  She is not ill-appearing or toxic-appearing  HENT:      Head: Normocephalic and atraumatic  Eyes:      General: No scleral icterus  Extraocular Movements: Extraocular movements intact  Cardiovascular:      Rate and Rhythm: Normal rate and regular rhythm  Pulses: Normal pulses  Heart sounds: Normal heart sounds  No murmur heard  No gallop  Pulmonary:      Effort: Pulmonary effort is normal  No respiratory distress  Breath sounds: Normal breath sounds  No wheezing or rales  Abdominal:      General: Abdomen is flat  Bowel sounds are normal       Palpations: Abdomen is soft  Tenderness: There is no abdominal tenderness  Musculoskeletal:      Right lower leg: Edema (1+) present  Left lower leg: Edema (1+) present  Skin:     General: Skin is warm and dry  Coloration: Skin is not jaundiced  Neurological:      Mental Status: She is alert              Lab Results   Component Value Date    K 4 3 01/24/2022     01/24/2022    CO2 29 01/24/2022    BUN 22 01/24/2022    CREATININE 0 86 01/24/2022    CALCIUM 8 7 01/24/2022    ALT 16 01/24/2022    AST 22 01/24/2022    INR 1 00 03/25/2021       No results found for: CHOL, HDL, LDLCALC, TRIG    Lab Results   Component Value Date    WBC 7 19 01/24/2022    HGB 12 7 01/24/2022    HCT 38 7 01/24/2022     01/24/2022       Cardiac studies:   Results for orders placed or performed in visit on 02/09/22   POCT ECG    Impression    Normal sinus rhythm         TTE - 3/26/2021:  LEFT VENTRICLE: Size was normal  Systolic function was normal  Ejection fraction was estimated to be 60 %  There were no regional wall motion abnormalities  Wall thickness was mildly increased  The changes were consistent with concentric  remodeling (increased wall thickness with normal wall mass)  DOPPLER: Doppler parameters were consistent with abnormal left ventricular relaxation (grade 1 diastolic dysfunction)  RIGHT VENTRICLE: The size was normal  Systolic function was normal  Wall thickness was normal   LEFT ATRIUM: Size was normal   RIGHT ATRIUM: Size was normal   MITRAL VALVE: Valve structure was normal  There was normal leaflet separation  DOPPLER: The transmitral velocity was within the normal range  There was no evidence for stenosis  There was mild regurgitation  AORTIC VALVE: The valve was trileaflet  Leaflets exhibited mildly increased thickness, mild calcification, and normal cuspal separation  DOPPLER: Transaortic velocity was within the normal range  There was no evidence for stenosis  There  was mild regurgitation  TRICUSPID VALVE: The valve structure was normal  There was normal leaflet separation  DOPPLER: The transtricuspid velocity was within the normal range  There was no evidence for stenosis  There was mild regurgitation  Pulmonary artery  systolic pressure was within the normal range  Estimated peak PA pressure was 33 mmHg  PULMONIC VALVE: Leaflets exhibited normal thickness, no calcification, and normal cuspal separation  DOPPLER: The transpulmonic velocity was within the normal range  There was mild regurgitation  PERICARDIUM: There was no pericardial effusion  The pericardium was normal in appearance  AORTA: The root exhibited normal size    SYSTEMIC VEINS: IVC: The inferior vena cava was normal in size  Respirophasic changes were normal      PULMONARY VEINS: DOPPLER: Doppler flow pattern was normal in the pulmonary vein(s)  ASSESSMENT AND PLAN:  Michael Carlos was seen today for new patient visit and hypertension  Diagnoses and all orders for this visit:    Primary hypertension: Ms Román Seals is a pleasant 80year old female today who presents for elevated blood pressure during her recent hospitalization for RSV  She has not been taking her amlodipine (not included on the home medication list she provided today)  She is taking lisinopril 10 mg daily and propranolol 30 mg once daily  I spoke with her and her granddaughter about simplifying her regimen  Discontinue Lisinopril and amlodipine and substitute with Lotrel 5-20 mg daily  She can continue propranolol 10 mg and I have instructed her to take it twice daily  She will return for a blood pressure check next week  -     amLODIPine-benazepril (LOTREL 5-20) 5-20 MG per capsule;  Take 1 capsule by mouth daily    Tony Wall MD

## 2022-02-10 PROCEDURE — 93000 ELECTROCARDIOGRAM COMPLETE: CPT | Performed by: INTERNAL MEDICINE

## 2022-02-16 ENCOUNTER — CLINICAL SUPPORT (OUTPATIENT)
Dept: CARDIOLOGY CLINIC | Facility: CLINIC | Age: 87
End: 2022-02-16
Payer: COMMERCIAL

## 2022-02-16 VITALS
WEIGHT: 182 LBS | HEIGHT: 64 IN | HEART RATE: 88 BPM | OXYGEN SATURATION: 97 % | DIASTOLIC BLOOD PRESSURE: 90 MMHG | BODY MASS INDEX: 31.07 KG/M2 | SYSTOLIC BLOOD PRESSURE: 180 MMHG

## 2022-02-16 DIAGNOSIS — I10 HYPERTENSION, UNSPECIFIED TYPE: Primary | ICD-10-CM

## 2022-02-16 PROCEDURE — 99212 OFFICE O/P EST SF 10 MIN: CPT

## 2022-02-16 RX ORDER — TAFLUPROST 0 MG/.3ML
SOLUTION/ DROPS OPHTHALMIC
COMMUNITY
Start: 2022-01-14

## 2022-02-16 RX ORDER — BRIMONIDINE TARTRATE/TIMOLOL 0.2%-0.5%
DROPS OPHTHALMIC (EYE)
COMMUNITY
Start: 2021-12-14

## 2022-02-16 NOTE — PROGRESS NOTES
Pt states that she had spaguetti and garlic bread for lunch  Her meals are prepared by cook at nursing home if she needs a low sodium diet she would need a letter by provider to give nursing home   F/u with Dr Candi Hammond

## 2022-03-09 ENCOUNTER — TELEPHONE (OUTPATIENT)
Dept: CARDIOLOGY CLINIC | Facility: CLINIC | Age: 87
End: 2022-03-09

## 2022-03-09 NOTE — TELEPHONE ENCOUNTER
----- Message from [employer] [facility]bo sent at 3/9/2022 12:08 PM EST -----  Regarding: FW: Brimonidine-Timolol Prescription  Ralph Shaffer,    The patient should not be taking this medication anymore per the last hospitalization in January of this year it was discontinued  It is listed on the after visit discharge patient paper work  Instructing patient to stop the Combigan which is the brand name to the below medication     Thanks  Simeon Chavez  RX Department  ----- Message -----  From: Anel Menezes  Sent: 3/7/2022  11:22 AM EST  To: [facility]  Subject: FW: Brimonidine-Timolol Prescription             Julee Mater is there any way you can help with prior authorization? If the Dr Taz Mendoza to write a different script let me know  Thank you in advance  ----- Message -----  From: Johan Barnes  Sent: 3/7/2022  11:17 AM EST  To: [facility]  Subject: Brimonidine-Timolol Prescription                 Hello,    This message is from Cheryl's granddaughter  She received a letter from her insurance that the Brimonidine-Timolol prescription is not covered on their formulary and coverage will not be granted beyond the temporary supply of 31 days  The documentation is attached  Can you please advise if there is a different medication she can be prescribed that would be covered under her insurance?     Thank you,    Annmarie Tan

## 2022-03-09 NOTE — TELEPHONE ENCOUNTER
Called pt's granddaughter Francheska Locke to update why insurance denied Rx Brimonidine-Timolol, as per Prior [employer]  Pt's discharge paperwork from [facility] visit to stop taking this medication   Left call back number if she had questions

## 2022-03-09 NOTE — TELEPHONE ENCOUNTER
Spoke with granddaughter Talib Sarah and explained new medication Lotrel Dr Forrest Heady started  Talib Sarah understood and all is well   No further action

## 2022-03-18 ENCOUNTER — OFFICE VISIT (OUTPATIENT)
Dept: OBGYN CLINIC | Facility: CLINIC | Age: 87
End: 2022-03-18
Payer: COMMERCIAL

## 2022-03-18 VITALS
BODY MASS INDEX: 31.07 KG/M2 | HEART RATE: 78 BPM | WEIGHT: 182 LBS | SYSTOLIC BLOOD PRESSURE: 171 MMHG | HEIGHT: 64 IN | DIASTOLIC BLOOD PRESSURE: 80 MMHG

## 2022-03-18 DIAGNOSIS — M17.11 PRIMARY OSTEOARTHRITIS OF RIGHT KNEE: Primary | ICD-10-CM

## 2022-03-18 PROCEDURE — 99213 OFFICE O/P EST LOW 20 MIN: CPT | Performed by: PHYSICAL MEDICINE & REHABILITATION

## 2022-03-18 PROCEDURE — 20610 DRAIN/INJ JOINT/BURSA W/O US: CPT | Performed by: PHYSICAL MEDICINE & REHABILITATION

## 2022-03-18 RX ORDER — TRIAMCINOLONE ACETONIDE 40 MG/ML
40 INJECTION, SUSPENSION INTRA-ARTICULAR; INTRAMUSCULAR
Status: COMPLETED | OUTPATIENT
Start: 2022-03-18 | End: 2022-03-18

## 2022-03-18 RX ORDER — LIDOCAINE HYDROCHLORIDE 10 MG/ML
3 INJECTION, SOLUTION INFILTRATION; PERINEURAL
Status: COMPLETED | OUTPATIENT
Start: 2022-03-18 | End: 2022-03-18

## 2022-03-18 RX ADMIN — LIDOCAINE HYDROCHLORIDE 3 ML: 10 INJECTION, SOLUTION INFILTRATION; PERINEURAL at 10:13

## 2022-03-18 RX ADMIN — TRIAMCINOLONE ACETONIDE 40 MG: 40 INJECTION, SUSPENSION INTRA-ARTICULAR; INTRAMUSCULAR at 10:13

## 2022-03-18 NOTE — PROGRESS NOTES
1  Primary osteoarthritis of right knee       Orders Placed This Encounter   Procedures    Large joint arthrocentesis        Impression:  Patient is here in follow up of right knee pain secondary to osteoarthritis   We discussed different treatment options and decided to proceed with a repeat steroid injection today  I will see her back in three months for repeat steroid injection if needed   If her symptoms return earlier than this, I am happy to see her earlier and would consider a ketorolac injection  Vinayak Kinds also consider pain management consult for genicular nerve block and/or viscosupplementation        Imaging Studies (I personally reviewed images in PACS and report):  Right knee and right ankle gravity stress x-rays most recent to this encounter reviewed   These images show  nondisplaced proximal fibula fracture to the fibular head/ neck region   There is moderate -severe tricompartmental osteoarthritis in the knee without a joint effusion   The ankle x-rays show no acute osseous abnormality or syndesmosis widening on gravity stress view   Slightly osteopenic appearing bones on these x-rays      Repeat x-rays with focus on the proximal fibula obtained on 4/13/2021: These images show no obvious fracture  Return in about 3 months (around 6/18/2022)  Patient is in agreement with the above plan  HPI:  Concha Frye is a 80 y o  female  who presents in follow up  Here for   Chief Complaint   Patient presents with    Right Knee - Follow-up       Since last visit:  Pain has returned  Following history reviewed and updated:  Past Medical History:   Diagnosis Date    Abnormal gait     Anxiety     Arthritis     DJD (degenerative joint disease)     GERD (gastroesophageal reflux disease)     HTN (hypertension)     Hypertension     Hypothyroidism     Osteoporosis      History reviewed  No pertinent surgical history    Social History   Social History     Substance and Sexual Activity   Alcohol Use Not Currently     Social History     Substance and Sexual Activity   Drug Use Never     Social History     Tobacco Use   Smoking Status Never Smoker   Smokeless Tobacco Never Used     History reviewed  No pertinent family history  Allergies   Allergen Reactions    Iodinated Diagnostic Agents Hives    Iodine - Food Allergy Other (See Comments)     shellfish and test dye    Nsaids GI Intolerance    Shellfish-Derived Products - Food Allergy Hives        Constitutional:  BP (!) 171/80   Pulse 78   Ht 5' 4" (1 626 m)   Wt 82 6 kg (182 lb)   BMI 31 24 kg/m²    General: NAD  Eyes: Clear sclerae  ENT: No inflammation, lesion, or mass of lips  No tracheal deviation  Musculoskeletal: As mentioned below  Integumentary: No visible rashes or skin lesions  Pulmonary/Chest: Effort normal  No respiratory distress  Neuro: CN's grossly intact, TREVIÑO  Psych: Normal affect and judgement  Vascular: WWP  Right Knee Exam     Tenderness   The patient is experiencing tenderness in the medial joint line  Range of Motion   Extension: normal   Flexion: abnormal     Tests   Varus: negative Valgus: negative    Other   Erythema: absent  Scars: absent  Sensation: normal  Pulse: present  Swelling: none  Effusion: no effusion present             Large joint arthrocentesis: R knee  Universal Protocol:  Consent given by: patient  Timeout called at: 3/18/2022 10:12 AM   Site marked: the operative site was marked  Supporting Documentation  Indications: pain   Procedure Details  Location: knee - R knee  Needle gauge: 21G 2''  Ultrasound guidance: no  Approach: Inferolateral to patella  Medications administered: 40 mg triamcinolone acetonide 40 mg/mL; 3 mL lidocaine 1 %    Patient tolerance: patient tolerated the procedure well with no immediate complications  Dressing:  Sterile dressing applied    A 2 inch needle was used and I was able to hub the entire needle    This makes it fairly certain that I am within the intercondylar notch/joint space  There was little to no resistance encountered during the injection  Prior to the procedure, the patient was informed of the following risks in layman terms:    - Risk of bleeding since a needle is involved  - Risk of infection (1/10,000 chance as per recent studies)  Signs/symptoms were discussed and they would prompt an urgent evaluation at an emergency department   - Risk of pigmentation or skin dimpling in the skin (2-3% chance as per recent studies) from the steroid  - Risk of increased pain from steroid flare (1% chance as per recent studies) that typically lasts 24-48 hours  - Risk of increased blood sugars from the steroid medication that can last for a few weeks  If the patient is a diabetic or pre-diabetic, they were encouraged to closely monitor their blood sugars and discuss with PCP if elevated more than usual or if having symptoms  After going over these risks, we decided that the benefits outweigh the risks and proceeded with the procedure

## 2022-03-18 NOTE — PATIENT INSTRUCTIONS
You can obtain diclofenac cream/gel/ointment over the counter  Many brands make this medication and they include Voltaren, Aspercreme and Aleve  You can use this up to 3 times per day  It is best to wash the area with water and then pat dry  The cream absorbs better after this  Be careful not to let any pets or children get in contact with the medication as it can be harmful to them  If you develop a skin reaction, stop using the cream     You had a cortisone injection today  Some people also refer to this as steroid or corticosteroid  There are two medicines in this injection, a numbing medicine (anesthetic) and a steroid  Most people get relief immediately but it can take up to two weeks  Rarely, some people can get a flushing reaction where they feel warm and flushed in the face  This is a side effect and resolves on its own  If you experience any drainage, redness or fevers, please give us a call or go to the nearest emergency room

## 2022-06-21 ENCOUNTER — OFFICE VISIT (OUTPATIENT)
Dept: OBGYN CLINIC | Facility: CLINIC | Age: 87
End: 2022-06-21
Payer: COMMERCIAL

## 2022-06-21 VITALS
BODY MASS INDEX: 31.07 KG/M2 | HEART RATE: 77 BPM | WEIGHT: 182 LBS | SYSTOLIC BLOOD PRESSURE: 126 MMHG | DIASTOLIC BLOOD PRESSURE: 72 MMHG | HEIGHT: 64 IN

## 2022-06-21 DIAGNOSIS — M17.11 PRIMARY OSTEOARTHRITIS OF RIGHT KNEE: Primary | ICD-10-CM

## 2022-06-21 PROCEDURE — 20610 DRAIN/INJ JOINT/BURSA W/O US: CPT | Performed by: PHYSICAL MEDICINE & REHABILITATION

## 2022-06-21 PROCEDURE — 99213 OFFICE O/P EST LOW 20 MIN: CPT | Performed by: PHYSICAL MEDICINE & REHABILITATION

## 2022-06-21 RX ORDER — LIDOCAINE 40 MG/G
CREAM TOPICAL AS NEEDED
Qty: 30 G | Refills: 0 | Status: SHIPPED | OUTPATIENT
Start: 2022-06-21

## 2022-06-21 RX ORDER — LIDOCAINE HYDROCHLORIDE 10 MG/ML
3 INJECTION, SOLUTION INFILTRATION; PERINEURAL
Status: COMPLETED | OUTPATIENT
Start: 2022-06-21 | End: 2022-06-21

## 2022-06-21 RX ORDER — TRIAMCINOLONE ACETONIDE 40 MG/ML
80 INJECTION, SUSPENSION INTRA-ARTICULAR; INTRAMUSCULAR
Status: COMPLETED | OUTPATIENT
Start: 2022-06-21 | End: 2022-06-21

## 2022-06-21 RX ADMIN — LIDOCAINE HYDROCHLORIDE 3 ML: 10 INJECTION, SOLUTION INFILTRATION; PERINEURAL at 16:33

## 2022-06-21 RX ADMIN — TRIAMCINOLONE ACETONIDE 80 MG: 40 INJECTION, SUSPENSION INTRA-ARTICULAR; INTRAMUSCULAR at 16:33

## 2022-06-21 NOTE — PROGRESS NOTES
1  Primary osteoarthritis of right knee  Diclofenac Sodium (VOLTAREN) 1 %    lidocaine (LMX) 4 % cream     Orders Placed This Encounter   Procedures    Large joint arthrocentesis        Impression:  Patient is here in follow up of right knee pain secondary to osteoarthritis   We discussed different treatment options and decided to proceed with a repeat steroid injection today  I will see her back in three months for repeat steroid injection if needed   If her symptoms return earlier than this, I am happy to see her earlier and would consider a ketorolac injection   Can also consider pain management consult for genicular nerve block and/or viscosupplementation  I have also prescribed her diclofenac and lidocaine cream       Imaging Studies (I personally reviewed images in PACS and report):  Right knee and right ankle gravity stress x-rays most recent to this encounter reviewed   These images show  nondisplaced proximal fibula fracture to the fibular head/ neck region  Stacy Heart is moderate -severe tricompartmental osteoarthritis in the knee without a joint effusion   The ankle x-rays show no acute osseous abnormality or syndesmosis widening on gravity stress view   Slightly osteopenic appearing bones on these x-rays      Repeat x-rays with focus on the proximal fibula obtained on 4/13/2021: These images show no obvious fracture  No follow-ups on file  Patient is in agreement with the above plan  HPI:  Violetta Essex is a 80 y o  female  who presents in follow up  Here for   Chief Complaint   Patient presents with    Right Knee - Follow-up       Since last visit: See above  Following history reviewed and updated:  Past Medical History:   Diagnosis Date    Abnormal gait     Anxiety     Arthritis     DJD (degenerative joint disease)     GERD (gastroesophageal reflux disease)     HTN (hypertension)     Hypertension     Hypothyroidism     Osteoporosis      No past surgical history on file    Social History   Social History     Substance and Sexual Activity   Alcohol Use Not Currently     Social History     Substance and Sexual Activity   Drug Use Never     Social History     Tobacco Use   Smoking Status Never Smoker   Smokeless Tobacco Never Used     No family history on file  Allergies   Allergen Reactions    Iodinated Diagnostic Agents Hives    Iodine - Food Allergy Other (See Comments)     shellfish and test dye    Nsaids GI Intolerance    Shellfish-Derived Products - Food Allergy Hives        Constitutional:  /72   Pulse 77   Ht 5' 4" (1 626 m)   Wt 82 6 kg (182 lb)   BMI 31 24 kg/m²    General: NAD  Eyes: Clear sclerae  ENT: No inflammation, lesion, or mass of lips  No tracheal deviation  Musculoskeletal: As mentioned below  Integumentary: No visible rashes or skin lesions  Pulmonary/Chest: Effort normal  No respiratory distress  Neuro: CN's grossly intact, TREVIÑO  Psych: Normal affect and judgement  Vascular: WWP  Right Knee Exam     Tenderness   The patient is experiencing tenderness in the medial joint line  Range of Motion   Extension: normal   Flexion: abnormal     Tests   Varus: negative Valgus: negative    Other   Erythema: absent  Scars: absent  Sensation: normal  Pulse: present             Large joint arthrocentesis: R knee  Universal Protocol:  Consent: Verbal consent obtained  Written consent not obtained  Risks and benefits: risks, benefits and alternatives were discussed  Consent given by: patient  Timeout called at: 6/21/2022 4:17 PM   Patient understanding: patient states understanding of the procedure being performed  Patient consent: the patient's understanding of the procedure matches consent given  Site marked: the operative site was marked  Radiology Images displayed and confirmed   If images not available, report reviewed: imaging studies available  Patient identity confirmed: verbally with patient    Supporting Documentation  Indications: pain   Procedure Details  Location: knee - R knee  Needle size: 22 G  Ultrasound guidance: no  Approach: Inferolateral to patella  Medications administered: 3 mL lidocaine 1 %; 80 mg triamcinolone acetonide 40 mg/mL    Patient tolerance: patient tolerated the procedure well with no immediate complications  Dressing:  Sterile dressing applied    There was little to no resistance encountered during the injection  Prior to the procedure, the patient was informed of the following risks in layman terms:    - Risk of bleeding since a needle is involved  - Risk of infection (1/10,000 chance as per recent studies)  Signs/symptoms were discussed and they would prompt an urgent evaluation at an emergency department   - Risk of pigmentation or skin dimpling in the skin (2-3% chance as per recent studies) from the steroid  - Risk of increased pain from steroid flare (1% chance as per recent studies) that typically lasts 24-48 hours  - Risk of increased blood sugars from the steroid medication that can last for a few weeks  If the patient is a diabetic or pre-diabetic, they were encouraged to closely monitor their blood sugars and discuss with PCP if elevated more than usual or if having symptoms  After going over these risks, we decided that the benefits outweigh the risks and proceeded with the procedure

## 2022-08-13 ENCOUNTER — HOSPITAL ENCOUNTER (EMERGENCY)
Facility: HOSPITAL | Age: 87
Discharge: HOME | End: 2022-08-13
Attending: EMERGENCY MEDICINE
Payer: COMMERCIAL

## 2022-08-13 ENCOUNTER — APPOINTMENT (EMERGENCY)
Dept: RADIOLOGY | Facility: HOSPITAL | Age: 87
End: 2022-08-13
Payer: COMMERCIAL

## 2022-08-13 VITALS
DIASTOLIC BLOOD PRESSURE: 79 MMHG | RESPIRATION RATE: 18 BRPM | SYSTOLIC BLOOD PRESSURE: 173 MMHG | TEMPERATURE: 99.4 F | OXYGEN SATURATION: 91 % | HEART RATE: 81 BPM

## 2022-08-13 DIAGNOSIS — M25.512 PAIN OF BOTH SHOULDER JOINTS: Primary | ICD-10-CM

## 2022-08-13 DIAGNOSIS — M19.90 OSTEOARTHRITIS: ICD-10-CM

## 2022-08-13 DIAGNOSIS — M25.511 PAIN OF BOTH SHOULDER JOINTS: Primary | ICD-10-CM

## 2022-08-13 DIAGNOSIS — M71.9 BURSITIS: ICD-10-CM

## 2022-08-13 LAB
2HR DELTA HS TROPONIN: 1 NG/L
ALBUMIN SERPL BCP-MCNC: 3.7 G/DL (ref 3.5–5)
ALP SERPL-CCNC: 89 U/L (ref 34–104)
ALT SERPL W P-5'-P-CCNC: 9 U/L (ref 7–52)
ANION GAP SERPL CALCULATED.3IONS-SCNC: 7 MMOL/L (ref 4–13)
AST SERPL W P-5'-P-CCNC: 14 U/L (ref 13–39)
BACTERIA UR QL AUTO: ABNORMAL /HPF
BASOPHILS # BLD AUTO: 0.04 THOUSANDS/ΜL (ref 0–0.1)
BASOPHILS NFR BLD AUTO: 1 % (ref 0–1)
BILIRUB SERPL-MCNC: 0.29 MG/DL (ref 0.2–1)
BILIRUB UR QL STRIP: NEGATIVE
BNP SERPL-MCNC: 104 PG/ML (ref 0–100)
BUN SERPL-MCNC: 19 MG/DL (ref 5–25)
CALCIUM SERPL-MCNC: 8.8 MG/DL (ref 8.4–10.2)
CARDIAC TROPONIN I PNL SERPL HS: 4 NG/L
CARDIAC TROPONIN I PNL SERPL HS: 5 NG/L
CHLORIDE SERPL-SCNC: 101 MMOL/L (ref 96–108)
CK SERPL-CCNC: 50 U/L (ref 26–192)
CLARITY UR: CLEAR
CO2 SERPL-SCNC: 28 MMOL/L (ref 21–32)
COLOR UR: ABNORMAL
CREAT SERPL-MCNC: 0.68 MG/DL (ref 0.6–1.3)
CRP SERPL QL: 11.4 MG/L
EOSINOPHIL # BLD AUTO: 0.2 THOUSAND/ΜL (ref 0–0.61)
EOSINOPHIL NFR BLD AUTO: 3 % (ref 0–6)
ERYTHROCYTE [DISTWIDTH] IN BLOOD BY AUTOMATED COUNT: 13.2 % (ref 11.6–15.1)
ERYTHROCYTE [SEDIMENTATION RATE] IN BLOOD: 47 MM/HOUR (ref 0–29)
FLUAV RNA RESP QL NAA+PROBE: NEGATIVE
FLUBV RNA RESP QL NAA+PROBE: NEGATIVE
GFR SERPL CREATININE-BSD FRML MDRD: 76 ML/MIN/1.73SQ M
GLUCOSE SERPL-MCNC: 102 MG/DL (ref 65–140)
GLUCOSE UR STRIP-MCNC: NEGATIVE MG/DL
HCT VFR BLD AUTO: 36 % (ref 34.8–46.1)
HGB BLD-MCNC: 11.8 G/DL (ref 11.5–15.4)
HGB UR QL STRIP.AUTO: ABNORMAL
IMM GRANULOCYTES # BLD AUTO: 0.04 THOUSAND/UL (ref 0–0.2)
IMM GRANULOCYTES NFR BLD AUTO: 1 % (ref 0–2)
KETONES UR STRIP-MCNC: NEGATIVE MG/DL
LACTATE SERPL-SCNC: 0.6 MMOL/L (ref 0.5–2)
LEUKOCYTE ESTERASE UR QL STRIP: NEGATIVE
LYMPHOCYTES # BLD AUTO: 0.95 THOUSANDS/ΜL (ref 0.6–4.47)
LYMPHOCYTES NFR BLD AUTO: 12 % (ref 14–44)
MCH RBC QN AUTO: 32.1 PG (ref 26.8–34.3)
MCHC RBC AUTO-ENTMCNC: 32.8 G/DL (ref 31.4–37.4)
MCV RBC AUTO: 98 FL (ref 82–98)
MONOCYTES # BLD AUTO: 0.68 THOUSAND/ΜL (ref 0.17–1.22)
MONOCYTES NFR BLD AUTO: 9 % (ref 4–12)
NEUTROPHILS # BLD AUTO: 6.02 THOUSANDS/ΜL (ref 1.85–7.62)
NEUTS SEG NFR BLD AUTO: 74 % (ref 43–75)
NITRITE UR QL STRIP: NEGATIVE
NON-SQ EPI CELLS URNS QL MICRO: ABNORMAL /HPF
NRBC BLD AUTO-RTO: 0 /100 WBCS
PH UR STRIP.AUTO: 5.5 [PH]
PLATELET # BLD AUTO: 314 THOUSANDS/UL (ref 149–390)
PMV BLD AUTO: 8.5 FL (ref 8.9–12.7)
POTASSIUM SERPL-SCNC: 4 MMOL/L (ref 3.5–5.3)
PROT SERPL-MCNC: 6.8 G/DL (ref 6.4–8.4)
PROT UR STRIP-MCNC: NEGATIVE MG/DL
RBC # BLD AUTO: 3.68 MILLION/UL (ref 3.81–5.12)
RBC #/AREA URNS AUTO: ABNORMAL /HPF
RSV RNA RESP QL NAA+PROBE: NEGATIVE
SARS-COV-2 RNA RESP QL NAA+PROBE: NEGATIVE
SODIUM SERPL-SCNC: 136 MMOL/L (ref 135–147)
SP GR UR STRIP.AUTO: 1.02 (ref 1–1.03)
URATE SERPL-MCNC: 5.2 MG/DL (ref 2–7.5)
UROBILINOGEN UR STRIP-ACNC: <2 MG/DL
WBC # BLD AUTO: 7.93 THOUSAND/UL (ref 4.31–10.16)
WBC #/AREA URNS AUTO: ABNORMAL /HPF

## 2022-08-13 PROCEDURE — 0241U HB NFCT DS VIR RESP RNA 4 TRGT: CPT | Performed by: EMERGENCY MEDICINE

## 2022-08-13 PROCEDURE — 86140 C-REACTIVE PROTEIN: CPT | Performed by: EMERGENCY MEDICINE

## 2022-08-13 PROCEDURE — 73030 X-RAY EXAM OF SHOULDER: CPT

## 2022-08-13 PROCEDURE — 80053 COMPREHEN METABOLIC PANEL: CPT | Performed by: EMERGENCY MEDICINE

## 2022-08-13 PROCEDURE — 99285 EMERGENCY DEPT VISIT HI MDM: CPT

## 2022-08-13 PROCEDURE — 85652 RBC SED RATE AUTOMATED: CPT | Performed by: EMERGENCY MEDICINE

## 2022-08-13 PROCEDURE — 83880 ASSAY OF NATRIURETIC PEPTIDE: CPT | Performed by: EMERGENCY MEDICINE

## 2022-08-13 PROCEDURE — 99284 EMERGENCY DEPT VISIT MOD MDM: CPT | Performed by: EMERGENCY MEDICINE

## 2022-08-13 PROCEDURE — 84550 ASSAY OF BLOOD/URIC ACID: CPT | Performed by: EMERGENCY MEDICINE

## 2022-08-13 PROCEDURE — 82550 ASSAY OF CK (CPK): CPT | Performed by: EMERGENCY MEDICINE

## 2022-08-13 PROCEDURE — 85025 COMPLETE CBC W/AUTO DIFF WBC: CPT | Performed by: EMERGENCY MEDICINE

## 2022-08-13 PROCEDURE — 86618 LYME DISEASE ANTIBODY: CPT | Performed by: EMERGENCY MEDICINE

## 2022-08-13 PROCEDURE — 87040 BLOOD CULTURE FOR BACTERIA: CPT | Performed by: EMERGENCY MEDICINE

## 2022-08-13 PROCEDURE — 36415 COLL VENOUS BLD VENIPUNCTURE: CPT

## 2022-08-13 PROCEDURE — 71045 X-RAY EXAM CHEST 1 VIEW: CPT

## 2022-08-13 PROCEDURE — 84484 ASSAY OF TROPONIN QUANT: CPT | Performed by: EMERGENCY MEDICINE

## 2022-08-13 PROCEDURE — 93005 ELECTROCARDIOGRAM TRACING: CPT

## 2022-08-13 PROCEDURE — 81001 URINALYSIS AUTO W/SCOPE: CPT | Performed by: EMERGENCY MEDICINE

## 2022-08-13 PROCEDURE — 73560 X-RAY EXAM OF KNEE 1 OR 2: CPT

## 2022-08-13 PROCEDURE — 83605 ASSAY OF LACTIC ACID: CPT | Performed by: EMERGENCY MEDICINE

## 2022-08-13 RX ORDER — MELOXICAM 15 MG/1
15 TABLET ORAL DAILY
Qty: 14 TABLET | Refills: 0 | Status: SHIPPED | OUTPATIENT
Start: 2022-08-13 | End: 2022-08-27

## 2022-08-13 RX ORDER — ACETAMINOPHEN 325 MG/1
975 TABLET ORAL ONCE
Status: COMPLETED | OUTPATIENT
Start: 2022-08-13 | End: 2022-08-13

## 2022-08-13 RX ADMIN — ACETAMINOPHEN 975 MG: 325 TABLET ORAL at 17:59

## 2022-08-13 RX ADMIN — ACETAMINOPHEN 975 MG: 325 TABLET ORAL at 11:32

## 2022-08-13 RX ADMIN — DICLOFENAC SODIUM 2 G: 10 GEL TOPICAL at 14:38

## 2022-08-13 NOTE — ED NOTES
Spoke with [address] child Heriberto De Anda per grand child she will come pick her up       Margarita Sepulveda RN  08/13/22 0238

## 2022-08-13 NOTE — ED PROVIDER NOTES
History  Chief Complaint   Patient presents with    Weakness - Generalized    Generalized Body Aches     Pt presents to the ED with generalized weakness and generalized body aches ongoing for about 2 weeks  Referred to ED for worsening shoulder pain and right knee pain  Pending covid test  Comes from [facility] independent living  26-year-old female coming into the ED for evaluation of bilateral shoulder pain, generalized weakness, right knee and leg pain  She states this has been ongoing for about 2 weeks, gradually worsening  She lives in assisted living, and does have difficulty taking care of herself now  She does have a history of osteoarthritis  Denies any fevers or chills  She does complain that her whole body hurts as well  History provided by:  Patient   used: No    Generalized Body Aches      Prior to Admission Medications   Prescriptions Last Dose Informant Patient Reported? Taking?    Combigan 0 2-0 5 %   Yes No   Sig: INSERT 1 DROP OPHTHALMICALLY EVERY 12 HOURS   Diclofenac Sodium (VOLTAREN) 1 %   No No   Sig: Apply 2 g topically 2 (two) times a day as needed (Pain)   Flaxseed, Linseed, (Flax Seed Oil) 1000 MG CAPS   Yes No   Sig: Take 1 tablet by mouth 2 (two) times a day   Zioptan 0 0015 % SOLN   Yes No   acetaminophen (TYLENOL) 325 mg tablet   No No   Sig: Take 3 tablets (975 mg total) by mouth every 8 (eight) hours   amLODIPine-benazepril (LOTREL 5-20) 5-20 MG per capsule   No No   Sig: Take 1 capsule by mouth daily   cycloSPORINE (RESTASIS) 0 05 % ophthalmic emulsion   Yes No   Si drop 2 (two) times a day   gabapentin (NEURONTIN) 300 mg capsule   Yes No   Sig: Take 300 mg by mouth 3 (three) times a day   levothyroxine 25 mcg tablet   Yes No   Sig: Take 25 mcg by mouth daily   lidocaine (LMX) 4 % cream   No No   Sig: Apply topically as needed for mild pain   meloxicam (MOBIC) 15 mg tablet   Yes No   Sig: Take 15 mg by mouth daily   meloxicam (MOBIC) 15 mg tablet   No Yes   Sig: Take 1 tablet (15 mg total) by mouth daily for 14 days   omeprazole (PriLOSEC) 20 mg delayed release capsule   Yes No   Sig: Take 20 mg by mouth daily   propranolol (INDERAL) 10 mg tablet   Yes No   Sig: Take 10 mg by mouth 3 (three) times a day   sertraline (ZOLOFT) 25 mg tablet   Yes No   Sig: Take 25 mg by mouth daily      Facility-Administered Medications: None       Past Medical History:   Diagnosis Date    Abnormal gait     Anxiety     Arthritis     DJD (degenerative joint disease)     GERD (gastroesophageal reflux disease)     HTN (hypertension)     Hypertension     Hypothyroidism     Osteoporosis        No past surgical history on file  No family history on file  I have reviewed and agree with the history as documented  E-Cigarette/Vaping    E-Cigarette Use Never User      E-Cigarette/Vaping Substances     Social History     Tobacco Use    Smoking status: Never Smoker    Smokeless tobacco: Never Used   Vaping Use    Vaping Use: Never used   Substance Use Topics    Alcohol use: Not Currently    Drug use: Never       Review of Systems   Musculoskeletal: Positive for arthralgias and joint swelling  All other systems reviewed and are negative  Physical Exam  Physical Exam  Vitals and nursing note reviewed  Constitutional:       General: She is not in acute distress  Appearance: Normal appearance  She is well-developed and normal weight  She is not ill-appearing, toxic-appearing or diaphoretic  HENT:      Head: Normocephalic and atraumatic  Right Ear: External ear normal       Left Ear: External ear normal       Nose: Nose normal       Mouth/Throat:      Mouth: Mucous membranes are moist       Pharynx: Oropharynx is clear  Eyes:      Conjunctiva/sclera: Conjunctivae normal    Cardiovascular:      Rate and Rhythm: Normal rate and regular rhythm  Pulses: Normal pulses  Heart sounds: Normal heart sounds     Pulmonary:      Effort: Pulmonary effort is normal       Breath sounds: Normal breath sounds  Abdominal:      General: Abdomen is flat  Bowel sounds are normal  There is no distension or abdominal bruit  There are no signs of injury  Palpations: Abdomen is soft  There is no shifting dullness  Tenderness: There is no abdominal tenderness  Genitourinary:     Adnexa: Right adnexa normal and left adnexa normal    Musculoskeletal:         General: Normal range of motion  Cervical back: Normal range of motion and neck supple  Comments: Swelling and tenderness to bilateral shoulder joints, with limited ROM but able to lift above the head bilaterally  R knee + swelling, mild joint effusion, no warmth or redness  Skin:     General: Skin is warm and dry  Capillary Refill: Capillary refill takes less than 2 seconds  Neurological:      General: No focal deficit present  Mental Status: She is alert and oriented to person, place, and time  Mental status is at baseline     Psychiatric:         Mood and Affect: Mood normal          Behavior: Behavior normal          Vital Signs  ED Triage Vitals   Temperature Pulse Respirations Blood Pressure SpO2   08/13/22 1035 08/13/22 1035 08/13/22 1035 08/13/22 1035 08/13/22 1035   (S) 99 4 °F (37 4 °C) 76 16 166/84 92 %      Temp Source Heart Rate Source Patient Position - Orthostatic VS BP Location FiO2 (%)   08/13/22 1035 08/13/22 1035 08/13/22 1035 08/13/22 1035 --   Oral Monitor Sitting Right arm       Pain Score       08/13/22 1132       Med Not Given for Pain - for MAR use only           Vitals:    08/13/22 1330 08/13/22 1400 08/13/22 1430 08/13/22 1759   BP: 150/77 166/74 167/74 (!) 173/79   Pulse: 70 80 70 81   Patient Position - Orthostatic VS:   Sitting Lying         Visual Acuity      ED Medications  Medications   acetaminophen (TYLENOL) tablet 975 mg (975 mg Oral Given 8/13/22 1132)   Diclofenac Sodium (VOLTAREN) 1 % topical gel 2 g (2 g Topical Given 8/13/22 1438) acetaminophen (TYLENOL) tablet 975 mg (975 mg Oral Given 8/13/22 1759)       Diagnostic Studies  Results Reviewed     Procedure Component Value Units Date/Time    Urine Microscopic [[id number]]  (Abnormal) Collected: 08/13/22 1801    Lab Status: Final result Specimen: Urine, Other Updated: 08/13/22 1834     RBC, UA 4-10 /hpf      WBC, UA 1-2 /hpf      Epithelial Cells Occasional /hpf      Bacteria, UA None Seen /hpf     UA w Reflex to Microscopic w Reflex to Culture [[id number]]  (Abnormal) Collected: 08/13/22 1801    Lab Status: Final result Specimen: Urine, Other Updated: 08/13/22 1813     Color, UA Light Yellow     Clarity, UA Clear     Specific Gravity, UA 1 017     pH, UA 5 5     Leukocytes, UA Negative     Nitrite, UA Negative     Protein, UA Negative mg/dl      Glucose, UA Negative mg/dl      Ketones, UA Negative mg/dl      Urobilinogen, UA <2 0 mg/dl      Bilirubin, UA Negative     Occult Blood, UA Trace    Sedimentation rate, automated [[id number]]  (Abnormal) Collected: 08/13/22 1038    Lab Status: Final result Specimen: Blood from Arm, Right Updated: 08/13/22 1422     Sed Rate 47 mm/hour     C-reactive protein [[id number]]  (Abnormal) Collected: 08/13/22 1038    Lab Status: Final result Specimen: Blood from Arm, Right Updated: 08/13/22 1410     CRP 11 4 mg/L     CK (with reflex to MB) [[id number]]  (Normal) Collected: 08/13/22 1038    Lab Status: Final result Specimen: Blood from Arm, Right Updated: 08/13/22 1410     Total CK 50 U/L     Uric acid [[id number]]  (Normal) Collected: 08/13/22 1038    Lab Status: Final result Specimen: Blood from Arm, Right Updated: 08/13/22 1410     Uric Acid 5 2 mg/dL     HS Troponin I 2hr [[id number]]  (Normal) Collected: 08/13/22 1238    Lab Status: Final result Specimen: Blood from Arm, Right Updated: 08/13/22 1309     hs TnI 2hr 5 ng/L      Delta 2hr hsTnI 1 ng/L     FLU/RSV/COVID - if FLU/RSV clinically relevant [[id number]]  (Normal) Collected: 08/13/22 1138    Lab Status: Final result Specimen: Nares from Nose Updated: 08/13/22 1225     SARS-CoV-2 Negative     INFLUENZA A PCR Negative     INFLUENZA B PCR Negative     RSV PCR Negative    Narrative:      FOR PEDIATRIC PATIENTS - copy/paste COVID Guidelines URL to browser: [email or web address]    SARS-CoV-2 assay is a Nucleic Acid Amplification assay intended for the  qualitative detection of nucleic acid from SARS-CoV-2 in nasopharyngeal  swabs  Results are for the presumptive identification of SARS-CoV-2 RNA  Positive results are indicative of infection with SARS-CoV-2, the virus  causing COVID-19, but do not rule out bacterial infection or co-infection  with other viruses  Laboratories within the [place] and its  territories are required to report all positive results to the appropriate  public health authorities  Negative results do not preclude SARS-CoV-2  infection and should not be used as the sole basis for treatment or other  patient management decisions  Negative results must be combined with  clinical observations, patient history, and epidemiological information  This test has not been FDA cleared or approved  This test has been authorized by FDA under an Emergency Use Authorization  (EUA)  This test is only authorized for the duration of time the  declaration that circumstances exist justifying the authorization of the  emergency use of an in vitro diagnostic tests for detection of SARS-CoV-2  virus and/or diagnosis of COVID-19 infection under section 564(b)(1) of  the Act, 21 U  S C  995MPA-5(I)(7), unless the authorization is terminated  or revoked sooner  The test has been validated but independent review by FDA  and CLIA is pending  Test performed using [employer] GeneXpert: This RT-PCR assay targets N2,  a region unique to SARS-CoV-2  A conserved region in the E-gene was chosen  for pan-Sarbecovirus detection which includes SARS-CoV-2      Lactic acid [[id number]]  (Normal) Collected: 08/13/22 1128    Lab Status: Final result Specimen: Blood from Arm, Right Updated: 08/13/22 1155     LACTIC ACID 0 6 mmol/L     Narrative:      Result may be elevated if tourniquet was used during collection  Lyme Antibody Profile with reflex to [facility] [[id number]] Collected: 08/13/22 1128    Lab Status: In process Specimen: Blood from Arm, Right Updated: 08/13/22 1137    Blood culture #1 [[id number]] Collected: 08/13/22 1124    Lab Status: In process Specimen: Blood from Arm, Left Updated: 08/13/22 1137    Blood culture #2 [[id number]] Collected: 08/13/22 1128    Lab Status:  In process Specimen: Blood from Arm, Right Updated: 08/13/22 1137    B-Type Natriuretic Peptide(BNP) AN, CA, EA Campuses Only [[id number]]  (Abnormal) Collected: 08/13/22 1038    Lab Status: Final result Specimen: Blood from Arm, Right Updated: 08/13/22 1125      pg/mL     HS Troponin 0hr (reflex protocol) [[id number]]  (Normal) Collected: 08/13/22 1038    Lab Status: Final result Specimen: Blood from Arm, Right Updated: 08/13/22 1113     hs TnI 0hr 4 ng/L     Comprehensive metabolic panel [[id number]] Collected: 08/13/22 1038    Lab Status: Final result Specimen: Blood from Arm, Right Updated: 08/13/22 1104     Sodium 136 mmol/L      Potassium 4 0 mmol/L      Chloride 101 mmol/L      CO2 28 mmol/L      ANION GAP 7 mmol/L      BUN 19 mg/dL      Creatinine 0 68 mg/dL      Glucose 102 mg/dL      Calcium 8 8 mg/dL      AST 14 U/L      ALT 9 U/L      Alkaline Phosphatase 89 U/L      Total Protein 6 8 g/dL      Albumin 3 7 g/dL      Total Bilirubin 0 29 mg/dL      eGFR 76 ml/min/1 73sq m     Narrative:      Renee guidelines for Chronic Kidney Disease (CKD):     Stage 1 with normal or high GFR (GFR > 90 mL/min/1 73 square meters)    Stage 2 Mild CKD (GFR = 60-89 mL/min/1 73 square meters)    Stage 3A Moderate CKD (GFR = 45-59 mL/min/1 73 square meters)    Stage 3B Moderate CKD (GFR = 30-44 mL/min/1 73 square meters)    Stage 4 Severe CKD (GFR = 15-29 mL/min/1 73 square meters)    Stage 5 End Stage CKD (GFR <15 mL/min/1 73 square meters)  Note: GFR calculation is accurate only with a steady state creatinine    CBC and differential [[id number]]  (Abnormal) Collected: 08/13/22 1038    Lab Status: Final result Specimen: Blood from Arm, Right Updated: 08/13/22 1046     WBC 7 93 Thousand/uL      RBC 3 68 Million/uL      Hemoglobin 11 8 g/dL      Hematocrit 36 0 %      MCV 98 fL      MCH 32 1 pg      MCHC 32 8 g/dL      RDW 13 2 %      MPV 8 5 fL      Platelets 193 Thousands/uL      nRBC 0 /100 WBCs      Neutrophils Relative 74 %      Immat GRANS % 1 %      Lymphocytes Relative 12 %      Monocytes Relative 9 %      Eosinophils Relative 3 %      Basophils Relative 1 %      Neutrophils Absolute 6 02 Thousands/µL      Immature Grans Absolute 0 04 Thousand/uL      Lymphocytes Absolute 0 95 Thousands/µL      Monocytes Absolute 0 68 Thousand/µL      Eosinophils Absolute 0 20 Thousand/µL      Basophils Absolute 0 04 Thousands/µL                  XR chest 1 view portable   Final Result by Thalia Gomes MD (08/13 1134)      No acute cardiopulmonary disease  Workstation performed: [id number]         XR shoulder 2+ views RIGHT   Final Result by Leighann Lewis MD (82/32 1057)         1  No acute osseous adenopathy  2   Evidence of calcific tendinitis  3   Severe glenohumeral osteoarthritis  4   Evidence of subdeltoid bursitis  Workstation performed: [id number]         XR shoulder 2+ views LEFT   Final Result by Leighann Lewis MD (85/68 1815)         1  No acute osseous abnormality  2   Severe osteoarthritis glenohumeral joint  3   Enlarged subdeltoid bursa consistent with subdeltoid bursitis  4   Acromial spur  Findings marked as significant in Epic        Workstation performed: [id number]         XR knee 1 or 2 vw right   Final Result by Ailyn Chang Kaya Maurer MD (08/13 1826)         1  No acute osseous abnormality  2   Evidence of joint effusion  3   Osteoarthritis similar to prior  Workstation performed: [id number]                    Procedures  Procedures         ED Course                                             MDM  Number of Diagnoses or Management Options  Bursitis: new and requires workup  Osteoarthritis: new and requires workup  Pain of both shoulder joints: new and requires workup     Amount and/or Complexity of Data Reviewed  Clinical lab tests: ordered and reviewed  Tests in the radiology section of CPT®: ordered and reviewed  Decide to obtain previous medical records or to obtain history from someone other than the patient: yes    Risk of Complications, Morbidity, and/or Mortality  Presenting problems: moderate  Diagnostic procedures: moderate  Management options: moderate    Patient Progress  Patient progress: stable      Disposition  Final diagnoses:   Pain of both shoulder joints   Osteoarthritis   Bursitis - bilateral shoulders     Time reflects when diagnosis was documented in both MDM as applicable and the Disposition within this note     Time User Action Codes Description Comment    8/13/2022  2:16 PM Ernesta Eriksson Add [M25 511,  M25 512] Pain of both shoulder joints     8/13/2022  7:31 PM Ernesta Eriksson Add [M19 90] Osteoarthritis     8/13/2022  7:31 PM Ernesta Eriksson Add [M71 9] Bursitis     8/13/2022  7:31 PM Ernesta Eriksson Modify [M71 9] Bursitis bilateral shoulders      ED Disposition     ED Disposition   Discharge    Condition   Stable    Date/Time   Sat Aug 13, 2022  2:15 PM    Comment   Columba Moore discharge to home/self care                 Follow-up Information     Follow up With Specialties Details Why Contact Info Additional Information    Marlon Alfaro MD Family Medicine In 3 days  [address] [facility]  [phone]       [address] [place] [address] Surgery In 3 days  [address] [facility] [address]  [address] [facility] [place], [place] [address], [address] [place], [place], [facility]          Discharge Medication List as of 8/13/2022  2:20 PM      CONTINUE these medications which have CHANGED    Details   meloxicam (MOBIC) 15 mg tablet Take 1 tablet (15 mg total) by mouth daily for 14 days, Starting Sat 8/13/2022, Until Sat 8/27/2022, Normal         CONTINUE these medications which have NOT CHANGED    Details   acetaminophen (TYLENOL) 325 mg tablet Take 3 tablets (975 mg total) by mouth every 8 (eight) hours, Starting Sat 3/27/2021, No Print      amLODIPine-benazepril (LOTREL 5-20) 5-20 MG per capsule Take 1 capsule by mouth daily, Starting Wed 2/9/2022, Normal      Combigan 0 2-0 5 % INSERT 1 DROP OPHTHALMICALLY EVERY 12 HOURS, Historical Med      cycloSPORINE (RESTASIS) 0 05 % ophthalmic emulsion 1 drop 2 (two) times a day, Historical Med      Diclofenac Sodium (VOLTAREN) 1 % Apply 2 g topically 2 (two) times a day as needed (Pain), Starting Tue 6/21/2022, Normal      Flaxseed, Linseed, (Flax Seed Oil) 1000 MG CAPS Take 1 tablet by mouth 2 (two) times a day, Historical Med      gabapentin (NEURONTIN) 300 mg capsule Take 300 mg by mouth 3 (three) times a day, Historical Med      levothyroxine 25 mcg tablet Take 25 mcg by mouth daily, Historical Med      lidocaine (LMX) 4 % cream Apply topically as needed for mild pain, Starting Tue 6/21/2022, Normal      omeprazole (PriLOSEC) 20 mg delayed release capsule Take 20 mg by mouth daily, Historical Med      propranolol (INDERAL) 10 mg tablet Take 10 mg by mouth 3 (three) times a day, Historical Med      sertraline (ZOLOFT) 25 mg tablet Take 25 mg by mouth daily, Historical Med      Zioptan 0 0015 % SOLN Starting Fri 1/14/2022, Historical Med             No discharge procedures on file      PDMP Review     None          ED Provider  Electronically Signed by           Arnold Craig,   08/13/22 1932

## 2022-08-13 NOTE — ED NOTES
Attempted to contact patient family members Jocelyne Raymundo and Daphney Gonzalez per patient request in regards to picking up patient  No answer  Patient made aware  Food tray ordered       Erick Rollins RN  08/13/22 0522

## 2022-08-13 NOTE — ED NOTES
PT repositioned and given a dinner menu  PT is in slight distress wanting to go home  Call was placed to [facility] by Josette Díaz RN for update        Araseli Lee [facility]  08/13/22 1629       Aurelio Messina  08/13/22 1633

## 2022-08-14 LAB — B BURGDOR IGG+IGM SER-ACNC: 0.3 AI

## 2022-08-16 LAB
ATRIAL RATE: 73 BPM
P AXIS: 67 DEGREES
PR INTERVAL: 150 MS
QRS AXIS: 44 DEGREES
QRSD INTERVAL: 74 MS
QT INTERVAL: 380 MS
QTC INTERVAL: 418 MS
T WAVE AXIS: 63 DEGREES
VENTRICULAR RATE: 73 BPM

## 2022-08-16 PROCEDURE — 93010 ELECTROCARDIOGRAM REPORT: CPT | Performed by: INTERNAL MEDICINE

## 2022-08-18 LAB
BACTERIA BLD CULT: NORMAL
BACTERIA BLD CULT: NORMAL

## 2022-09-30 ENCOUNTER — OFFICE VISIT (OUTPATIENT)
Dept: OBGYN CLINIC | Facility: CLINIC | Age: 87
End: 2022-09-30
Payer: COMMERCIAL

## 2022-09-30 VITALS
HEART RATE: 71 BPM | SYSTOLIC BLOOD PRESSURE: 134 MMHG | DIASTOLIC BLOOD PRESSURE: 81 MMHG | BODY MASS INDEX: 31.07 KG/M2 | WEIGHT: 182 LBS | HEIGHT: 64 IN

## 2022-09-30 DIAGNOSIS — M17.11 PRIMARY OSTEOARTHRITIS OF RIGHT KNEE: Primary | ICD-10-CM

## 2022-09-30 PROCEDURE — 99213 OFFICE O/P EST LOW 20 MIN: CPT | Performed by: PHYSICAL MEDICINE & REHABILITATION

## 2022-09-30 PROCEDURE — 20610 DRAIN/INJ JOINT/BURSA W/O US: CPT | Performed by: PHYSICAL MEDICINE & REHABILITATION

## 2022-09-30 RX ORDER — TRIAMCINOLONE ACETONIDE 40 MG/ML
80 INJECTION, SUSPENSION INTRA-ARTICULAR; INTRAMUSCULAR
Status: COMPLETED | OUTPATIENT
Start: 2022-09-30 | End: 2022-09-30

## 2022-09-30 RX ORDER — LIDOCAINE HYDROCHLORIDE 10 MG/ML
2 INJECTION, SOLUTION EPIDURAL; INFILTRATION; INTRACAUDAL; PERINEURAL
Status: COMPLETED | OUTPATIENT
Start: 2022-09-30 | End: 2022-09-30

## 2022-09-30 RX ADMIN — TRIAMCINOLONE ACETONIDE 80 MG: 40 INJECTION, SUSPENSION INTRA-ARTICULAR; INTRAMUSCULAR at 11:47

## 2022-09-30 RX ADMIN — LIDOCAINE HYDROCHLORIDE 2 ML: 10 INJECTION, SOLUTION EPIDURAL; INFILTRATION; INTRACAUDAL; PERINEURAL at 12:03

## 2022-09-30 NOTE — PROGRESS NOTES
1  Primary osteoarthritis of right knee  Large joint arthrocentesis: R knee     Orders Placed This Encounter   Procedures    Large joint arthrocentesis: R knee     Impression:  Patient is here in follow up of right knee pain secondary to osteoarthritis   We discussed different treatment options and decided to proceed with a repeat steroid injection today  I will see her back in three months for repeat steroid injection if needed   If her symptoms return earlier than this, I am happy to see her earlier and would consider a ketorolac injection   Can also consider pain management consult for genicular nerve block and/or viscosupplementation  Can consider left shoulder subacromial injection vs glenohumeral injection  Patient will try topical medications  Imaging Studies (I personally reviewed images in PACS and report):  Right knee and right ankle gravity stress x-rays most recent to this encounter reviewed   These images show  nondisplaced proximal fibula fracture to the fibular head/ neck region   There is moderate -severe tricompartmental osteoarthritis in the knee without a joint effusion   The ankle x-rays show no acute osseous abnormality or syndesmosis widening on gravity stress view   Slightly osteopenic appearing bones on these x-rays  Return in about 6 weeks (around 11/11/2022)  Patient is in agreement with the above plan  HPI:  Alvarez Yee is a 80 y o  female  who presents in follow up  Here for   Chief Complaint   Patient presents with    Right Knee - Pain, Follow-up     Getting injection       Since last visit: See above  Following history reviewed and updated:  Past Medical History:   Diagnosis Date    Abnormal gait     Anxiety     Arthritis     DJD (degenerative joint disease)     GERD (gastroesophageal reflux disease)     HTN (hypertension)     Hypertension     Hypothyroidism     Osteoporosis      History reviewed  No pertinent surgical history    Social History Social History     Substance and Sexual Activity   Alcohol Use Not Currently     Social History     Substance and Sexual Activity   Drug Use Never     Social History     Tobacco Use   Smoking Status Never Smoker   Smokeless Tobacco Never Used     History reviewed  No pertinent family history  Allergies   Allergen Reactions    Iodinated Diagnostic Agents Hives    Iodine - Food Allergy Other (See Comments)     shellfish and test dye    Nsaids GI Intolerance    Shellfish-Derived Products - Food Allergy Hives        Constitutional:  /81   Pulse 71   Ht 5' 4" (1 626 m)   Wt 82 6 kg (182 lb)   BMI 31 24 kg/m²    General: NAD  Eyes: Clear sclerae  ENT: No inflammation, lesion, or mass of lips  No tracheal deviation  Musculoskeletal: As mentioned below  Integumentary: No visible rashes or skin lesions  Pulmonary/Chest: Effort normal  No respiratory distress  Neuro: CN's grossly intact, TREVIÑO  Psych: Normal affect and judgement  Vascular: WWP  Right Knee Exam     Tenderness   The patient is experiencing tenderness in the medial joint line  Range of Motion   Extension: normal   Flexion: abnormal     Tests   Varus: negative Valgus: negative    Other   Erythema: absent  Scars: absent  Sensation: normal             Large joint arthrocentesis: R knee  Universal Protocol:  Consent: Verbal consent obtained  Written consent not obtained  Risks and benefits: risks, benefits and alternatives were discussed  Consent given by: patient  Timeout called at: 9/30/2022 11:46 AM   Patient understanding: patient states understanding of the procedure being performed  Patient consent: the patient's understanding of the procedure matches consent given  Site marked: the operative site was marked  Radiology Images displayed and confirmed   If images not available, report reviewed: imaging studies available  Patient identity confirmed: verbally with patient    Supporting Documentation  Indications: pain   Procedure Details  Location: knee - R knee  Needle size: 22 G  Ultrasound guidance: no  Approach: Inferolateral to patella  Medications administered: 80 mg triamcinolone acetonide 40 mg/mL; 2 mL lidocaine (PF) 1 %    Patient tolerance: patient tolerated the procedure well with no immediate complications  Dressing:  Sterile dressing applied    There was little to no resistance encountered during the injection  Prior to the procedure, the patient was informed of the following risks in layman terms:    - Risk of bleeding since a needle is involved  - Risk of infection (1/10,000 chance as per recent studies)  Signs/symptoms were discussed and they would prompt an urgent evaluation at an emergency department   - Risk of pigmentation or skin dimpling in the skin (2-3% chance as per recent studies) from the steroid  - Risk of increased pain from steroid flare (1% chance as per recent studies) that typically lasts 24-48 hours  - Risk of increased blood sugars from the steroid medication that can last for a few weeks  If the patient is a diabetic or pre-diabetic, they were encouraged to closely monitor their blood sugars and discuss with PCP if elevated more than usual or if having symptoms  After going over these risks, we decided that the benefits outweigh the risks and proceeded with the procedure

## 2022-12-30 ENCOUNTER — OFFICE VISIT (OUTPATIENT)
Dept: OBGYN CLINIC | Facility: CLINIC | Age: 87
End: 2022-12-30

## 2022-12-30 VITALS
HEART RATE: 71 BPM | SYSTOLIC BLOOD PRESSURE: 155 MMHG | BODY MASS INDEX: 31.07 KG/M2 | HEIGHT: 64 IN | WEIGHT: 182 LBS | DIASTOLIC BLOOD PRESSURE: 79 MMHG

## 2022-12-30 DIAGNOSIS — M17.11 PRIMARY OSTEOARTHRITIS OF RIGHT KNEE: Primary | ICD-10-CM

## 2022-12-30 RX ORDER — FUROSEMIDE 20 MG/1
20 TABLET ORAL DAILY
COMMUNITY
Start: 2022-11-23

## 2022-12-30 RX ORDER — LISINOPRIL 20 MG/1
20 TABLET ORAL DAILY
COMMUNITY
Start: 2022-10-28

## 2022-12-30 RX ORDER — TRIAMCINOLONE ACETONIDE 40 MG/ML
80 INJECTION, SUSPENSION INTRA-ARTICULAR; INTRAMUSCULAR
Status: COMPLETED | OUTPATIENT
Start: 2022-12-30 | End: 2022-12-30

## 2022-12-30 RX ORDER — ROPIVACAINE HYDROCHLORIDE 5 MG/ML
10 INJECTION, SOLUTION EPIDURAL; INFILTRATION; PERINEURAL
Status: COMPLETED | OUTPATIENT
Start: 2022-12-30 | End: 2022-12-30

## 2022-12-30 RX ORDER — PROPRANOLOL HYDROCHLORIDE 20 MG/1
20 TABLET ORAL DAILY
COMMUNITY
Start: 2022-11-30

## 2022-12-30 RX ADMIN — TRIAMCINOLONE ACETONIDE 80 MG: 40 INJECTION, SUSPENSION INTRA-ARTICULAR; INTRAMUSCULAR at 10:05

## 2022-12-30 RX ADMIN — ROPIVACAINE HYDROCHLORIDE 10 ML: 5 INJECTION, SOLUTION EPIDURAL; INFILTRATION; PERINEURAL at 10:05

## 2022-12-30 NOTE — PROGRESS NOTES
1  Primary osteoarthritis of right knee  Large joint arthrocentesis: R knee    triamcinolone acetonide (KENALOG-40) 40 mg/mL injection 80 mg    ropivacaine (NAROPIN) 0 5 % injection 10 mL        Orders Placed This Encounter   Procedures   • Large joint arthrocentesis: R knee        Impression:  Patient is here in follow up of right knee pain secondary to osteoarthritis  She has done well with steroid injections in the past   We decided to proceed with the same today  RTC in 3 months if needed   If her symptoms return earlier than this, I am happy to see her earlier and would consider a ketorolac injection  Lilia Hoang also consider pain management consult for genicular nerve block and/or viscosupplementation       Patient also presents with a new complaint, left calf pain  This is likely secondary to a calf strain  Pain is worse with ambulation; specifically, during pushoff  There is no swelling or erythema  Normal Homans' sign  Patient will try diclofenac cream and compression stockings        Imaging Studies (I personally reviewed images in PACS and report):  Right knee and right ankle gravity stress x-rays most recent to this encounter reviewed   These images show  nondisplaced proximal fibula fracture to the fibular head/ neck region   There is moderate -severe tricompartmental osteoarthritis in the knee without a joint effusion   The ankle x-rays show no acute osseous abnormality or syndesmosis widening on gravity stress view   Slightly osteopenic appearing bones on these x-rays  Return in about 3 months (around 3/30/2023), or if symptoms worsen or fail to improve  Patient is in agreement with the above plan  HPI:  Rhonda Prince is a 80 y o  female  who presents in follow up  Here for   Chief Complaint   Patient presents with   • Right Knee - Pain, Follow-up, Injections       Since last visit: See above      Following history reviewed and updated:  Past Medical History:   Diagnosis Date   • Abnormal gait    • Anxiety    • Arthritis    • DJD (degenerative joint disease)    • GERD (gastroesophageal reflux disease)    • HTN (hypertension)    • Hypertension    • Hypothyroidism    • Osteoporosis      History reviewed  No pertinent surgical history  Social History   Social History     Substance and Sexual Activity   Alcohol Use Not Currently     Social History     Substance and Sexual Activity   Drug Use Never     Social History     Tobacco Use   Smoking Status Never   Smokeless Tobacco Never     History reviewed  No pertinent family history  Allergies   Allergen Reactions   • Iodinated Contrast Media Hives   • Iodine - Food Allergy Other (See Comments)     shellfish and test dye   • Nsaids GI Intolerance   • Shellfish-Derived Products - Food Allergy Hives        Constitutional:  /79   Pulse 71   Ht 5' 4" (1 626 m)   Wt 82 6 kg (182 lb)   BMI 31 24 kg/m²    General: NAD  Eyes: Clear sclerae  ENT: No inflammation, lesion, or mass of lips  No tracheal deviation  Musculoskeletal: As mentioned below  Integumentary: No visible rashes or skin lesions  Pulmonary/Chest: Effort normal  No respiratory distress  Neuro: CN's grossly intact, TREVIÑO  Psych: Normal affect and judgement  Vascular: WWP  Right Knee Exam     Tenderness   The patient is experiencing tenderness in the medial joint line and medial retinaculum  Range of Motion   Extension: normal   Flexion: abnormal     Tests   Varus: negative Valgus: negative    Other   Erythema: absent  Scars: absent  Sensation: normal  Pulse: present             Large joint arthrocentesis: R knee  Universal Protocol:  Consent: Verbal consent obtained  Written consent not obtained    Risks and benefits: risks, benefits and alternatives were discussed  Consent given by: patient  Timeout called at: 12/30/2022 10:04 AM   Patient understanding: patient states understanding of the procedure being performed  Patient consent: the patient's understanding of the procedure matches consent given  Site marked: the operative site was marked  Radiology Images displayed and confirmed  If images not available, report reviewed: imaging studies available  Patient identity confirmed: verbally with patient    Supporting Documentation  Indications: pain   Procedure Details  Location: knee - R knee  Needle size: 22 G  Ultrasound guidance: no  Approach: Inferolateral to patella  Medications administered: 80 mg triamcinolone acetonide 40 mg/mL; 10 mL ropivacaine 0 5 %    Patient tolerance: patient tolerated the procedure well with no immediate complications  Dressing:  Sterile dressing applied    There was little to no resistance encountered during the injection  Risks of this procedure include:    - Risk of bleeding since a needle is involved  - Risk of infection (1/10,000 chance as per recent studies)  Signs/symptoms were discussed and they would prompt an urgent evaluation at an emergency department   - Risk of pigmentation or skin dimpling in the skin (2-3% chance as per recent studies) from the steroid  - Risk of increased pain from steroid flare (1% chance as per recent studies) that typically lasts 24-48 hours  - Risk of increased blood sugars from the steroid medication that can last for a few weeks  If the patient is a diabetic or pre-diabetic, they were encouraged to closely monitor their blood sugars and discuss with PCP if elevated more than usual or if having symptoms  The benefits outweigh the risks and so the procedure was completed

## 2022-12-30 NOTE — PATIENT INSTRUCTIONS
You can obtain diclofenac cream/gel/ointment over the counter  Many brands make this medication and they include Voltaren, Aspercreme and Aleve  You can use this up to 3 times per day  It is best to wash the area with water, pat dry and then apply  The cream absorbs better after this  Be careful not to let any pets or children get in contact with the medication as it can be harmful to them  If you develop a skin reaction, stop using the cream      You had a cortisone injection today  Some people also refer to this as steroid or corticosteroid  There are two medicines in this injection, a numbing medicine (anesthetic) and a steroid  Most people get relief immediately but it can take up to two weeks  Rarely, some people can get a flushing reaction where they feel warm and flushed in the face  This is a side effect and resolves on its own  If you experience any drainage, redness or fevers, please give us a call or go to the nearest emergency room

## 2023-01-18 ENCOUNTER — HOSPITAL ENCOUNTER (EMERGENCY)
Facility: HOSPITAL | Age: 88
Discharge: HOME | End: 2023-01-18
Attending: EMERGENCY MEDICINE

## 2023-01-18 ENCOUNTER — HOSPITAL ENCOUNTER (EMERGENCY)
Dept: VASCULAR ULTRASOUND | Facility: HOSPITAL | Age: 88
Discharge: HOME | End: 2023-01-18

## 2023-01-18 ENCOUNTER — APPOINTMENT (EMERGENCY)
Dept: CT IMAGING | Facility: HOSPITAL | Age: 88
End: 2023-01-18

## 2023-01-18 ENCOUNTER — APPOINTMENT (EMERGENCY)
Dept: RADIOLOGY | Facility: HOSPITAL | Age: 88
End: 2023-01-18

## 2023-01-18 VITALS
SYSTOLIC BLOOD PRESSURE: 172 MMHG | RESPIRATION RATE: 18 BRPM | OXYGEN SATURATION: 91 % | DIASTOLIC BLOOD PRESSURE: 84 MMHG | TEMPERATURE: 98.2 F | HEART RATE: 92 BPM

## 2023-01-18 DIAGNOSIS — R53.83 FATIGUE: Primary | ICD-10-CM

## 2023-01-18 DIAGNOSIS — R53.1 GENERALIZED WEAKNESS: ICD-10-CM

## 2023-01-18 DIAGNOSIS — R06.00 DYSPNEA: ICD-10-CM

## 2023-01-18 LAB
2HR DELTA HS TROPONIN: 0 NG/L
4HR DELTA HS TROPONIN: -2 NG/L
ALBUMIN SERPL BCP-MCNC: 3.7 G/DL (ref 3.5–5)
ALP SERPL-CCNC: 81 U/L (ref 34–104)
ALT SERPL W P-5'-P-CCNC: 10 U/L (ref 7–52)
ANION GAP SERPL CALCULATED.3IONS-SCNC: 9 MMOL/L (ref 4–13)
AST SERPL W P-5'-P-CCNC: 15 U/L (ref 13–39)
BACTERIA UR QL AUTO: ABNORMAL /HPF
BASOPHILS # BLD AUTO: 0.03 THOUSANDS/ÂΜL (ref 0–0.1)
BASOPHILS NFR BLD AUTO: 1 % (ref 0–1)
BILIRUB SERPL-MCNC: 0.43 MG/DL (ref 0.2–1)
BILIRUB UR QL STRIP: NEGATIVE
BNP SERPL-MCNC: 92 PG/ML (ref 0–100)
BUN SERPL-MCNC: 13 MG/DL (ref 5–25)
CALCIUM SERPL-MCNC: 8.7 MG/DL (ref 8.4–10.2)
CARDIAC TROPONIN I PNL SERPL HS: 4 NG/L
CARDIAC TROPONIN I PNL SERPL HS: 6 NG/L
CARDIAC TROPONIN I PNL SERPL HS: 6 NG/L
CHLORIDE SERPL-SCNC: 101 MMOL/L (ref 96–108)
CLARITY UR: CLEAR
CO2 SERPL-SCNC: 26 MMOL/L (ref 21–32)
COLOR UR: ABNORMAL
CREAT SERPL-MCNC: 0.52 MG/DL (ref 0.6–1.3)
D DIMER PPP FEU-MCNC: 2.16 UG/ML FEU
EOSINOPHIL # BLD AUTO: 0.16 THOUSAND/ÂΜL (ref 0–0.61)
EOSINOPHIL NFR BLD AUTO: 2 % (ref 0–6)
ERYTHROCYTE [DISTWIDTH] IN BLOOD BY AUTOMATED COUNT: 13.2 % (ref 11.6–15.1)
FLUAV RNA RESP QL NAA+PROBE: NEGATIVE
FLUBV RNA RESP QL NAA+PROBE: NEGATIVE
GFR SERPL CREATININE-BSD FRML MDRD: 83 ML/MIN/1.73SQ M
GLUCOSE SERPL-MCNC: 96 MG/DL (ref 65–140)
GLUCOSE UR STRIP-MCNC: NEGATIVE MG/DL
HCT VFR BLD AUTO: 36 % (ref 34.8–46.1)
HGB BLD-MCNC: 11.5 G/DL (ref 11.5–15.4)
HGB UR QL STRIP.AUTO: ABNORMAL
IMM GRANULOCYTES # BLD AUTO: 0.02 THOUSAND/UL (ref 0–0.2)
IMM GRANULOCYTES NFR BLD AUTO: 0 % (ref 0–2)
KETONES UR STRIP-MCNC: NEGATIVE MG/DL
LEUKOCYTE ESTERASE UR QL STRIP: NEGATIVE
LYMPHOCYTES # BLD AUTO: 0.86 THOUSANDS/ÂΜL (ref 0.6–4.47)
LYMPHOCYTES NFR BLD AUTO: 13 % (ref 14–44)
MCH RBC QN AUTO: 31.3 PG (ref 26.8–34.3)
MCHC RBC AUTO-ENTMCNC: 31.9 G/DL (ref 31.4–37.4)
MCV RBC AUTO: 98 FL (ref 82–98)
MONOCYTES # BLD AUTO: 0.71 THOUSAND/ÂΜL (ref 0.17–1.22)
MONOCYTES NFR BLD AUTO: 11 % (ref 4–12)
NEUTROPHILS # BLD AUTO: 4.85 THOUSANDS/ÂΜL (ref 1.85–7.62)
NEUTS SEG NFR BLD AUTO: 73 % (ref 43–75)
NITRITE UR QL STRIP: NEGATIVE
NON-SQ EPI CELLS URNS QL MICRO: ABNORMAL /HPF
NRBC BLD AUTO-RTO: 0 /100 WBCS
PH UR STRIP.AUTO: 7.5 [PH]
PLATELET # BLD AUTO: 169 THOUSANDS/UL (ref 149–390)
PMV BLD AUTO: 9.4 FL (ref 8.9–12.7)
POTASSIUM SERPL-SCNC: 4.5 MMOL/L (ref 3.5–5.3)
PROCALCITONIN SERPL-MCNC: <0.05 NG/ML
PROT SERPL-MCNC: 6.4 G/DL (ref 6.4–8.4)
PROT UR STRIP-MCNC: NEGATIVE MG/DL
RBC # BLD AUTO: 3.67 MILLION/UL (ref 3.81–5.12)
RBC #/AREA URNS AUTO: ABNORMAL /HPF
RSV RNA RESP QL NAA+PROBE: NEGATIVE
SARS-COV-2 RNA RESP QL NAA+PROBE: NEGATIVE
SODIUM SERPL-SCNC: 136 MMOL/L (ref 135–147)
SP GR UR STRIP.AUTO: 1.01 (ref 1–1.03)
UROBILINOGEN UR STRIP-ACNC: <2 MG/DL
WBC # BLD AUTO: 6.63 THOUSAND/UL (ref 4.31–10.16)
WBC #/AREA URNS AUTO: ABNORMAL /HPF

## 2023-01-18 RX ORDER — IODIXANOL 320 MG/ML
85 INJECTION, SOLUTION INTRAVASCULAR
Status: COMPLETED | OUTPATIENT
Start: 2023-01-18 | End: 2023-01-18

## 2023-01-18 RX ORDER — ACETAMINOPHEN 325 MG/1
650 TABLET ORAL ONCE
Status: COMPLETED | OUTPATIENT
Start: 2023-01-18 | End: 2023-01-18

## 2023-01-18 RX ORDER — METHYLPREDNISOLONE SODIUM SUCCINATE 125 MG/2ML
125 INJECTION, POWDER, LYOPHILIZED, FOR SOLUTION INTRAMUSCULAR; INTRAVENOUS ONCE
Status: COMPLETED | OUTPATIENT
Start: 2023-01-18 | End: 2023-01-18

## 2023-01-18 RX ORDER — DIPHENHYDRAMINE HYDROCHLORIDE 50 MG/ML
50 INJECTION INTRAMUSCULAR; INTRAVENOUS ONCE
Status: COMPLETED | OUTPATIENT
Start: 2023-01-18 | End: 2023-01-18

## 2023-01-18 RX ADMIN — METHYLPREDNISOLONE SODIUM SUCCINATE 125 MG: 125 INJECTION, POWDER, FOR SOLUTION INTRAMUSCULAR; INTRAVENOUS at 12:59

## 2023-01-18 RX ADMIN — ACETAMINOPHEN 650 MG: 325 TABLET, FILM COATED ORAL at 12:53

## 2023-01-18 RX ADMIN — IODIXANOL 85 ML: 320 INJECTION, SOLUTION INTRAVASCULAR at 17:28

## 2023-01-18 RX ADMIN — DICLOFENAC SODIUM TOPICAL GEL, 1%, 2 G: 10 GEL TOPICAL at 18:09

## 2023-01-18 RX ADMIN — DIPHENHYDRAMINE HYDROCHLORIDE 50 MG: 50 INJECTION, SOLUTION INTRAMUSCULAR; INTRAVENOUS at 14:39

## 2023-01-18 NOTE — DISCHARGE INSTRUCTIONS
Have your blood pressure reassessed by your pcp at next appointment  Return to the ED with chest pain, shortness of breath, confusion, urinary problems, etc as we discussed  Drink plenty of clear fluids  Continue taking tylenol as needed

## 2023-01-18 NOTE — ED NOTES
Ultrasound at bedside  Provided pt with walker and hat to provide urine sample  Pt states she does not need to use the bathroom at this time        Rito Sauer RN  01/18/23 [id number]

## 2023-01-18 NOTE — ED PROVIDER NOTES
History  Chief Complaint   Patient presents with   • Weakness - Generalized     Per ems, pt comes from [facility] for 2 weeks of weakness, sob on exertion, and R leg pain  HPI patient is a 26-year-old female presenting today with 2 weeks of generalized weakness, right leg/calf pain and swelling, hypertension, and sinus pressure  Patient's blood pressure was elevated this morning and she states that her doctor did not make their appointment with her at her nursing home which made her anxious so she decided to come be evaluated  Not on blood thinners  No recent falls  Generally ambulates with a walker, pain in right leg is made it worse recently  Prior to Admission Medications   Prescriptions Last Dose Informant Patient Reported? Taking?    Combigan 0 2-0 5 %   Yes No   Sig: INSERT 1 DROP OPHTHALMICALLY EVERY 12 HOURS   Diclofenac Sodium (VOLTAREN) 1 %   No No   Sig: Apply 2 g topically 2 (two) times a day as needed (Pain)   Flaxseed, Linseed, (Flax Seed Oil) 1000 MG CAPS   Yes No   Sig: Take 1 tablet by mouth 2 (two) times a day   Zioptan 0 0015 % SOLN   Yes No   acetaminophen (TYLENOL) 325 mg tablet   No No   Sig: Take 3 tablets (975 mg total) by mouth every 8 (eight) hours   amLODIPine-benazepril (LOTREL 5-20) 5-20 MG per capsule   No No   Sig: Take 1 capsule by mouth daily   cycloSPORINE (RESTASIS) 0 05 % ophthalmic emulsion   Yes No   Si drop 2 (two) times a day   furosemide (LASIX) 20 mg tablet   Yes No   Sig: Take 20 mg by mouth daily   gabapentin (NEURONTIN) 300 mg capsule   Yes No   Sig: Take 300 mg by mouth 3 (three) times a day   levothyroxine 25 mcg tablet   Yes No   Sig: Take 25 mcg by mouth daily   lidocaine (LMX) 4 % cream   No No   Sig: Apply topically as needed for mild pain   lisinopril (ZESTRIL) 20 mg tablet   Yes No   Sig: Take 20 mg by mouth daily   meloxicam (MOBIC) 15 mg tablet   No No   Sig: Take 1 tablet (15 mg total) by mouth daily for 14 days   omeprazole (PriLOSEC) 20 mg delayed release capsule   Yes No   Sig: Take 20 mg by mouth daily   propranolol (INDERAL) 10 mg tablet   Yes No   Sig: Take 10 mg by mouth 3 (three) times a day   propranolol (INDERAL) 20 mg tablet   Yes No   Sig: Take 20 mg by mouth daily   sertraline (ZOLOFT) 25 mg tablet   Yes No   Sig: Take 25 mg by mouth daily      Facility-Administered Medications: None       Past Medical History:   Diagnosis Date   • Abnormal gait    • Anxiety    • Arthritis    • DJD (degenerative joint disease)    • GERD (gastroesophageal reflux disease)    • HTN (hypertension)    • Hypertension    • Hypothyroidism    • Osteoporosis        History reviewed  No pertinent surgical history  History reviewed  No pertinent family history  I have reviewed and agree with the history as documented  E-Cigarette/Vaping   • E-Cigarette Use Never User      E-Cigarette/Vaping Substances     Social History     Tobacco Use   • Smoking status: Never   • Smokeless tobacco: Never   Vaping Use   • Vaping Use: Never used   Substance Use Topics   • Alcohol use: Not Currently   • Drug use: Never        Review of Systems   Constitutional: Positive for fatigue  Negative for chills and fever  HENT: Positive for congestion and sinus pressure (frontal)  Negative for rhinorrhea and sore throat  Eyes: Negative for photophobia, pain, redness and visual disturbance  Respiratory: Positive for cough  Negative for choking, chest tightness, shortness of breath, wheezing and stridor  Cardiovascular: Positive for leg swelling (right)  Negative for chest pain and palpitations  Gastrointestinal: Negative for abdominal distention, abdominal pain, constipation, diarrhea, nausea and vomiting  Genitourinary: Negative for difficulty urinating, dysuria and flank pain  Musculoskeletal: Negative for neck pain and neck stiffness  Skin: Negative for rash  Neurological: Positive for dizziness (intermittent, ongoing problem)   Negative for tremors, seizures, syncope, facial asymmetry, speech difficulty, weakness, light-headedness, numbness and headaches  Psychiatric/Behavioral: Negative for confusion  All other systems reviewed and are negative  Physical Exam  ED Triage Vitals   Temperature Pulse Respirations Blood Pressure SpO2   01/18/23 0922 01/18/23 0916 01/18/23 0916 01/18/23 0916 01/18/23 0916   98 2 °F (36 8 °C) 74 19 156/74 94 %      Temp Source Heart Rate Source Patient Position - Orthostatic VS BP Location FiO2 (%)   01/18/23 0922 01/18/23 0916 01/18/23 0916 01/18/23 0916 --   Oral Monitor Sitting Right arm       Pain Score       01/18/23 1253       6             Orthostatic Vital Signs  Vitals:    01/18/23 1400 01/18/23 1700 01/18/23 1800 01/18/23 2000   BP: 164/70 (!) 171/82 170/81 (!) 172/84   Pulse: 76 85 88 92   Patient Position - Orthostatic VS: Sitting Sitting Lying        Physical Exam  Vitals and nursing note reviewed  Constitutional:       General: She is in acute distress (mild, anxious)  Appearance: She is not ill-appearing, toxic-appearing or diaphoretic  HENT:      Head: Normocephalic and atraumatic  Nose: Nose normal  No congestion or rhinorrhea  Mouth/Throat:      Mouth: Mucous membranes are moist       Pharynx: Oropharynx is clear  No oropharyngeal exudate or posterior oropharyngeal erythema  Eyes:      General: No scleral icterus  Right eye: No discharge  Left eye: No discharge  Extraocular Movements: Extraocular movements intact  Conjunctiva/sclera: Conjunctivae normal       Pupils: Pupils are equal, round, and reactive to light  Neck:      Vascular: No carotid bruit  Cardiovascular:      Rate and Rhythm: Normal rate and regular rhythm  Pulses: Normal pulses  Heart sounds: Normal heart sounds  No murmur heard  No friction rub  No gallop  Pulmonary:      Effort: Pulmonary effort is normal  No respiratory distress  Breath sounds: No stridor   Wheezing (bilateral bases) and rhonchi (bilateral bases) present  No rales  Chest:      Chest wall: No tenderness  Abdominal:      General: Abdomen is flat  There is no distension  Palpations: Abdomen is soft  There is no mass  Tenderness: There is no abdominal tenderness  There is no right CVA tenderness, left CVA tenderness, guarding or rebound  Hernia: No hernia is present  Musculoskeletal:         General: Swelling (RLE) and tenderness (right calf/posterior knee) present  No deformity or signs of injury  Normal range of motion  Cervical back: Normal range of motion and neck supple  No rigidity or tenderness  Lymphadenopathy:      Cervical: No cervical adenopathy  Skin:     General: Skin is warm and dry  Coloration: Skin is not jaundiced or pale  Findings: No bruising, erythema, lesion or rash  Neurological:      General: No focal deficit present  Mental Status: She is alert and oriented to person, place, and time  Mental status is at baseline  Cranial Nerves: No cranial nerve deficit  Motor: No weakness     Psychiatric:         Mood and Affect: Mood normal          Behavior: Behavior normal          ED Medications  Medications   methylPREDNISolone sodium succinate (Solu-MEDROL) injection 125 mg (125 mg Intravenous Given 1/18/23 1259)   acetaminophen (TYLENOL) tablet 650 mg (650 mg Oral Given 1/18/23 1253)   diphenhydrAMINE (BENADRYL) injection 50 mg (50 mg Intravenous Given 1/18/23 1439)   iodixanol (VISIPAQUE) 320 MG/ML injection 85 mL (85 mL Intravenous Given 1/18/23 1728)       Diagnostic Studies  Results Reviewed     Procedure Component Value Units Date/Time    HS Troponin I 4hr [[id number]]  (Normal) Collected: 01/18/23 1446    Lab Status: Final result Specimen: Blood from Arm, Right Updated: 01/18/23 1526     hs TnI 4hr 4 ng/L      Delta 4hr hsTnI -2 ng/L     HS Troponin I 2hr [[id number]]  (Normal) Collected: 01/18/23 1258    Lab Status: Final result Specimen: Blood from Arm, Right Updated: 01/18/23 1339     hs TnI 2hr 6 ng/L      Delta 2hr hsTnI 0 ng/L     Urine Microscopic [[id number]]  (Abnormal) Collected: 01/18/23 1216    Lab Status: Final result Specimen: Urine, Clean Catch Updated: 01/18/23 1233     RBC, UA 4-10 /hpf      WBC, UA 2-4 /hpf      Epithelial Cells Occasional /hpf      Bacteria, UA Occasional /hpf     UA w Reflex to Microscopic w Reflex to Culture [[id number]]  (Abnormal) Collected: 01/18/23 1216    Lab Status: Final result Specimen: Urine, Clean Catch Updated: 01/18/23 1227     Color, UA Light Yellow     Clarity, UA Clear     Specific Gravity, UA 1 015     pH, UA 7 5     Leukocytes, UA Negative     Nitrite, UA Negative     Protein, UA Negative mg/dl      Glucose, UA Negative mg/dl      Ketones, UA Negative mg/dl      Urobilinogen, UA <2 0 mg/dl      Bilirubin, UA Negative     Occult Blood, UA Trace    B-Type Natriuretic Peptide(BNP), AN, CA, EA Campuses Only [[id number]]  (Normal) Collected: 01/18/23 1013    Lab Status: Final result Specimen: Blood from Arm, Right Updated: 01/18/23 1211     BNP 92 pg/mL     Comprehensive metabolic panel [[id number]]  (Abnormal) Collected: 01/18/23 1013    Lab Status: Final result Specimen: Blood from Arm, Right Updated: 01/18/23 1149     Sodium 136 mmol/L      Potassium 4 5 mmol/L      Chloride 101 mmol/L      CO2 26 mmol/L      ANION GAP 9 mmol/L      BUN 13 mg/dL      Creatinine 0 52 mg/dL      Glucose 96 mg/dL      Calcium 8 7 mg/dL      AST 15 U/L      ALT 10 U/L      Alkaline Phosphatase 81 U/L      Total Protein 6 4 g/dL      Albumin 3 7 g/dL      Total Bilirubin 0 43 mg/dL      eGFR 83 ml/min/1 73sq m     Narrative:      [place] guidelines for Chronic Kidney Disease (CKD):   •  Stage 1 with normal or high GFR (GFR > 90 mL/min/1 73 square meters)  •  Stage 2 Mild CKD (GFR = 60-89 mL/min/1 73 square meters)  •  Stage 3A Moderate CKD (GFR = 45-59 mL/min/1 73 square meters)  •  Stage 3B Moderate CKD (GFR = 30-44 mL/min/1 73 square meters)  •  Stage 4 Severe CKD (GFR = 15-29 mL/min/1 73 square meters)  •  Stage 5 End Stage CKD (GFR <15 mL/min/1 73 square meters)  Note: GFR calculation is accurate only with a steady state creatinine    FLU/RSV/COVID - if FLU/RSV clinically relevant [[id number]]  (Normal) Collected: 01/18/23 1013    Lab Status: Final result Specimen: Nares from Nose Updated: 01/18/23 1107     SARS-CoV-2 Negative     INFLUENZA A PCR Negative     INFLUENZA B PCR Negative     RSV PCR Negative    Narrative:      FOR PEDIATRIC PATIENTS - copy/paste COVID Guidelines URL to browser: [email or web address]  [email or web address]    SARS-CoV-2 assay is a Nucleic Acid Amplification assay intended for the  qualitative detection of nucleic acid from SARS-CoV-2 in nasopharyngeal  swabs  Results are for the presumptive identification of SARS-CoV-2 RNA  Positive results are indicative of infection with SARS-CoV-2, the virus  causing COVID-19, but do not rule out bacterial infection or co-infection  with other viruses  Laboratories within the [place] and its  territories are required to report all positive results to the appropriate  public health authorities  Negative results do not preclude SARS-CoV-2  infection and should not be used as the sole basis for treatment or other  patient management decisions  Negative results must be combined with  clinical observations, patient history, and epidemiological information  This test has not been FDA cleared or approved  This test has been authorized by FDA under an Emergency Use Authorization  (EUA)  This test is only authorized for the duration of time the  declaration that circumstances exist justifying the authorization of the  emergency use of an in vitro diagnostic tests for detection of SARS-CoV-2  virus and/or diagnosis of COVID-19 infection under section 564(b)(1) of  the Act, 21 U  S C  885XSL-4(M)(6), unless the authorization is terminated  or revoked sooner  The test has been validated but independent review by FDA  and CLIA is pending  Test performed using [employer] GeneXpert: This RT-PCR assay targets N2,  a region unique to SARS-CoV-2  A conserved region in the E-gene was chosen  for pan-Sarbecovirus detection which includes SARS-CoV-2  According to CMS-2020-01-R, this platform meets the definition of high-throughput technology  D-dimer, quantitative [[id number]]  (Abnormal) Collected: 01/18/23 1013    Lab Status: Final result Specimen: Blood from Arm, Right Updated: 01/18/23 1102     D-Dimer, Quant 2 16 ug/ml FEU     Narrative: In the evaluation for possible pulmonary embolism, in the appropriate (Well's Score of 4 or less) patient, the age adjusted d-dimer cutoff for this patient can be calculated as:    Age x 0 01 (in ug/mL) for Age-adjusted D-dimer exclusion threshold for a patient over 50 years      Procalcitonin [[id number]]  (Normal) Collected: 01/18/23 1013    Lab Status: Final result Specimen: Blood from Arm, Right Updated: 01/18/23 1057     Procalcitonin <0 05 ng/ml     HS Troponin 0hr (reflex protocol) [[id number]]  (Normal) Collected: 01/18/23 1013    Lab Status: Final result Specimen: Blood from Arm, Right Updated: 01/18/23 1056     hs TnI 0hr 6 ng/L     CBC and differential [[id number]]  (Abnormal) Collected: 01/18/23 1013    Lab Status: Final result Specimen: Blood from Arm, Right Updated: 01/18/23 1040     WBC 6 63 Thousand/uL      RBC 3 67 Million/uL      Hemoglobin 11 5 g/dL      Hematocrit 36 0 %      MCV 98 fL      MCH 31 3 pg      MCHC 31 9 g/dL      RDW 13 2 %      MPV 9 4 fL      Platelets 282 Thousands/uL      nRBC 0 /100 WBCs      Neutrophils Relative 73 %      Immat GRANS % 0 %      Lymphocytes Relative 13 %      Monocytes Relative 11 %      Eosinophils Relative 2 %      Basophils Relative 1 %      Neutrophils Absolute 4 85 Thousands/µL      Immature Grans Absolute 0 02 Thousand/uL      Lymphocytes Absolute 0 86 Thousands/µL      Monocytes Absolute 0 71 Thousand/µL      Eosinophils Absolute 0 16 Thousand/µL      Basophils Absolute 0 03 Thousands/µL                  CTA ED chest PE Study   Final Result by Karely Coronado MD (01/18 1901)      No pulmonary embolism  Clear lungs  Small hiatal hernia  Advanced osteoarthritis of the glenohumeral joints with large fluid collection in the right subcoracoid bursa measuring 4 5 x 3 5 cm  Workstation performed: [id number]         VAS lower limb venous duplex study, unilateral/limited   Final Result by Yariel Collins DO (01/18 1501)      XR chest 1 view portable   Final Result by Pedro Pretty MD (01/18 1056)      No acute cardiopulmonary disease  Workstation performed: [id number]               Procedures  ECG 12 Lead Documentation Only    Date/Time: 1/18/2023 9:23 AM  Performed by: Julienne Aguilar DO  Authorized by: Julienne Aguilar DO     Indications / Diagnosis:  Fatigue, dyspnea   ECG reviewed by me, the ED Provider: yes    Patient location:  ED  Previous ECG:     Previous ECG:  Compared to current    Comparison ECG info:  8/13/22    Similarity:  No change    Comparison to cardiac monitor: Yes    Interpretation:     Interpretation: normal    Rate:     ECG rate:  77    ECG rate assessment: normal    Rhythm:     Rhythm: sinus rhythm    Comments:      No acute ischemia  No arrhythmia  NSR @ 77  Normal EKG  ED Course  ED Course as of 01/21/23 1427   Wed Jan 18, 2023   1141 D-Dimer, Quant(!): 2 16           Discussed case with pt  She is relieved that there was no evidence of PE, DVT on CT PE/Venous doppler of right leg  Pt is ready for discharge and will follow-up with her pcp  Vitals have been stable                     SBIRT 22yo+    Flowsheet Row Most Recent Value   SBIRT (23 yo +)    In order to provide better care to our patients, we are screening all of our patients for alcohol and drug use  Would it be okay to ask you these screening questions? Unable to answer at this time Filed at: 01/18/2023 0679            [facility]' Criteria for DVT    Flowsheet Row Most Recent Value   [place] Criteria for DVT    Active cancer Treatment or palliation within 6 months 0 Filed at: 01/18/2023 1142   Bedridden recently >3 days or major surgery within 12 weeks 0 Filed at: 01/18/2023 1142   Calf swelling >3 cm compared to the other leg 1 Filed at: 01/18/2023 1142   Entire leg swollen 1 Filed at: 01/18/2023 1142   Collateral (nonvaricose) superficial veins present 0 Filed at: 01/18/2023 1142   Localized tenderness along the deep venous system 1 Filed at: 01/18/2023 1142   Pitting edema, confined to symptomatic leg 0 Filed at: 01/18/2023 1142   Paralysis, paresis, or recent plaster immobilization of the lower extremity 0 Filed at: 01/18/2023 1142   Previously documented DVT 0 Filed at: 01/18/2023 1142   Alternative diagnosis to DVT as likely or more likely 0 Filed at: 01/18/2023 1142   Timmy DVT Critera Score 3 Filed at: 01/18/2023 1142          MDM   DDx including but not limited to: metabolic abnormality, dehydration, viral illness, anemia, ACS, MI, PE, DVT,  other infectious process including UTI; doubt Guillan [place] syndrome or myasthenia gravis or botulism or multiple sclerosis        Disposition  Final diagnoses:   Fatigue   Generalized weakness   Dyspnea - mild, with exertion     Time reflects when diagnosis was documented in both MDM as applicable and the Disposition within this note     Time User Action Codes Description Comment    1/18/2023  7:20 PM Mega Jessica Add [R53 83] Fatigue     1/18/2023  7:21 PM Ellijay Collier Add [R53 1] Generalized weakness     1/18/2023  7:24 PM Ellijay Collier Add [R06 00] Dyspnea     1/18/2023  7:25 PM Ellijay Jessica Modify [R06 00] Dyspnea mild, with exertion      ED Disposition     ED Disposition   Discharge    Condition Stable    Date/Time   Wed Jan 18, 2023  7:22 PM    Comment   Leo Sanders discharge to home/self care                 Follow-up Information     Follow up With Specialties Details Why Contact Info Additional Information    Janina Valera MD Family Medicine  As needed [address] Emergency Department Emergency Medicine  As needed [address] [facility] [address] [facility] Emergency Department, [address], [place], [place] [facility], [address]          Discharge Medication List as of 1/18/2023  7:25 PM      CONTINUE these medications which have NOT CHANGED    Details   acetaminophen (TYLENOL) 325 mg tablet Take 3 tablets (975 mg total) by mouth every 8 (eight) hours, Starting Sat 3/27/2021, No Print      amLODIPine-benazepril (LOTREL 5-20) 5-20 MG per capsule Take 1 capsule by mouth daily, Starting Wed 2/9/2022, Normal      Combigan 0 2-0 5 % INSERT 1 DROP OPHTHALMICALLY EVERY 12 HOURS, Historical Med      cycloSPORINE (RESTASIS) 0 05 % ophthalmic emulsion 1 drop 2 (two) times a day, Historical Med      Diclofenac Sodium (VOLTAREN) 1 % Apply 2 g topically 2 (two) times a day as needed (Pain), Starting Tue 6/21/2022, Normal      Flaxseed, Linseed, (Flax Seed Oil) 1000 MG CAPS Take 1 tablet by mouth 2 (two) times a day, Historical Med      furosemide (LASIX) 20 mg tablet Take 20 mg by mouth daily, Starting Wed 11/23/2022, Historical Med      gabapentin (NEURONTIN) 300 mg capsule Take 300 mg by mouth 3 (three) times a day, Historical Med      levothyroxine 25 mcg tablet Take 25 mcg by mouth daily, Historical Med      lidocaine (LMX) 4 % cream Apply topically as needed for mild pain, Starting Tue 6/21/2022, Normal      lisinopril (ZESTRIL) 20 mg tablet Take 20 mg by mouth daily, Starting Fri 10/28/2022, Historical Med      meloxicam (MOBIC) 15 mg tablet Take 1 tablet (15 mg total) by mouth daily for 14 days, Starting Sat 8/13/2022, Until Sat 8/27/2022, Normal      omeprazole (PriLOSEC) 20 mg delayed release capsule Take 20 mg by mouth daily, Historical Med      !! propranolol (INDERAL) 10 mg tablet Take 10 mg by mouth 3 (three) times a day, Historical Med      !! propranolol (INDERAL) 20 mg tablet Take 20 mg by mouth daily, Starting Wed 11/30/2022, Historical Med      sertraline (ZOLOFT) 25 mg tablet Take 25 mg by mouth daily, Historical Med      Zioptan 0 0015 % SOLN Starting Fri 1/14/2022, Historical Med       !! - Potential duplicate medications found  Please discuss with provider  No discharge procedures on file  PDMP Review     None           ED Provider  Attending physically available and evaluated Esha Kiran I managed the patient along with the ED Attending      Electronically Signed by         Columba Hector DO  01/21/23 7980

## 2023-01-19 LAB
ATRIAL RATE: 77 BPM
P AXIS: 78 DEGREES
PR INTERVAL: 160 MS
QRS AXIS: 30 DEGREES
QRSD INTERVAL: 76 MS
QT INTERVAL: 380 MS
QTC INTERVAL: 430 MS
T WAVE AXIS: 66 DEGREES
VENTRICULAR RATE: 77 BPM

## 2023-01-19 NOTE — ED NOTES
Pt's granddaughter contacted for update and at pt's request, to give her a ride home        Davin Fox RN  01/18/23 2012

## 2024-02-12 ENCOUNTER — HOSPITAL ENCOUNTER (INPATIENT)
Facility: HOSPITAL | Age: 89
LOS: 2 days | Discharge: NURSING, REHAB OR LONG-TERM CARE | DRG: 603 | End: 2024-02-14
Attending: EMERGENCY MEDICINE | Admitting: INTERNAL MEDICINE
Payer: COMMERCIAL

## 2024-02-12 ENCOUNTER — APPOINTMENT (EMERGENCY)
Dept: RADIOLOGY | Facility: HOSPITAL | Age: 89
DRG: 603 | End: 2024-02-12
Payer: COMMERCIAL

## 2024-02-12 ENCOUNTER — APPOINTMENT (EMERGENCY)
Dept: VASCULAR ULTRASOUND | Facility: HOSPITAL | Age: 89
DRG: 603 | End: 2024-02-12
Payer: COMMERCIAL

## 2024-02-12 DIAGNOSIS — L03.115 CELLULITIS OF RIGHT LEG: Primary | ICD-10-CM

## 2024-02-12 DIAGNOSIS — I10 PRIMARY HYPERTENSION: ICD-10-CM

## 2024-02-12 LAB
ALBUMIN SERPL BCP-MCNC: 4.2 G/DL (ref 3.5–5)
ALP SERPL-CCNC: 102 U/L (ref 34–104)
ALT SERPL W P-5'-P-CCNC: 11 U/L (ref 7–52)
ANION GAP SERPL CALCULATED.3IONS-SCNC: 7 MMOL/L
AST SERPL W P-5'-P-CCNC: 16 U/L (ref 13–39)
BASOPHILS # BLD AUTO: 0.03 THOUSANDS/ÂΜL (ref 0–0.1)
BASOPHILS NFR BLD AUTO: 1 % (ref 0–1)
BILIRUB SERPL-MCNC: 0.39 MG/DL (ref 0.2–1)
BUN SERPL-MCNC: 23 MG/DL (ref 5–25)
CALCIUM SERPL-MCNC: 9 MG/DL (ref 8.4–10.2)
CHLORIDE SERPL-SCNC: 98 MMOL/L (ref 96–108)
CO2 SERPL-SCNC: 30 MMOL/L (ref 21–32)
CREAT SERPL-MCNC: 0.69 MG/DL (ref 0.6–1.3)
EOSINOPHIL # BLD AUTO: 0.16 THOUSAND/ÂΜL (ref 0–0.61)
EOSINOPHIL NFR BLD AUTO: 3 % (ref 0–6)
ERYTHROCYTE [DISTWIDTH] IN BLOOD BY AUTOMATED COUNT: 13.6 % (ref 11.6–15.1)
GFR SERPL CREATININE-BSD FRML MDRD: 75 ML/MIN/1.73SQ M
GLUCOSE SERPL-MCNC: 94 MG/DL (ref 65–140)
HCT VFR BLD AUTO: 38.7 % (ref 34.8–46.1)
HGB BLD-MCNC: 12.8 G/DL (ref 11.5–15.4)
IMM GRANULOCYTES # BLD AUTO: 0.02 THOUSAND/UL (ref 0–0.2)
IMM GRANULOCYTES NFR BLD AUTO: 0 % (ref 0–2)
LYMPHOCYTES # BLD AUTO: 0.98 THOUSANDS/ÂΜL (ref 0.6–4.47)
LYMPHOCYTES NFR BLD AUTO: 16 % (ref 14–44)
MCH RBC QN AUTO: 33.3 PG (ref 26.8–34.3)
MCHC RBC AUTO-ENTMCNC: 33.1 G/DL (ref 31.4–37.4)
MCV RBC AUTO: 101 FL (ref 82–98)
MONOCYTES # BLD AUTO: 0.48 THOUSAND/ÂΜL (ref 0.17–1.22)
MONOCYTES NFR BLD AUTO: 8 % (ref 4–12)
NEUTROPHILS # BLD AUTO: 4.48 THOUSANDS/ÂΜL (ref 1.85–7.62)
NEUTS SEG NFR BLD AUTO: 72 % (ref 43–75)
NRBC BLD AUTO-RTO: 0 /100 WBCS
PLATELET # BLD AUTO: 233 THOUSANDS/UL (ref 149–390)
PMV BLD AUTO: 9.5 FL (ref 8.9–12.7)
POTASSIUM SERPL-SCNC: 4.8 MMOL/L (ref 3.5–5.3)
PROT SERPL-MCNC: 7.1 G/DL (ref 6.4–8.4)
RBC # BLD AUTO: 3.84 MILLION/UL (ref 3.81–5.12)
SODIUM SERPL-SCNC: 135 MMOL/L (ref 135–147)
WBC # BLD AUTO: 6.15 THOUSAND/UL (ref 4.31–10.16)

## 2024-02-12 PROCEDURE — 99285 EMERGENCY DEPT VISIT HI MDM: CPT

## 2024-02-12 PROCEDURE — 73552 X-RAY EXAM OF FEMUR 2/>: CPT

## 2024-02-12 PROCEDURE — 73560 X-RAY EXAM OF KNEE 1 OR 2: CPT

## 2024-02-12 PROCEDURE — 85025 COMPLETE CBC W/AUTO DIFF WBC: CPT

## 2024-02-12 PROCEDURE — 80053 COMPREHEN METABOLIC PANEL: CPT

## 2024-02-12 PROCEDURE — 93971 EXTREMITY STUDY: CPT

## 2024-02-12 PROCEDURE — 36415 COLL VENOUS BLD VENIPUNCTURE: CPT

## 2024-02-12 PROCEDURE — 73590 X-RAY EXAM OF LOWER LEG: CPT

## 2024-02-12 PROCEDURE — 72170 X-RAY EXAM OF PELVIS: CPT

## 2024-02-12 PROCEDURE — 99223 1ST HOSP IP/OBS HIGH 75: CPT | Performed by: INTERNAL MEDICINE

## 2024-02-12 PROCEDURE — 73610 X-RAY EXAM OF ANKLE: CPT

## 2024-02-12 RX ORDER — PANTOPRAZOLE SODIUM 40 MG/1
40 TABLET, DELAYED RELEASE ORAL
Status: DISCONTINUED | OUTPATIENT
Start: 2024-02-13 | End: 2024-02-14 | Stop reason: HOSPADM

## 2024-02-12 RX ORDER — LISINOPRIL 20 MG/1
20 TABLET ORAL DAILY
Status: DISCONTINUED | OUTPATIENT
Start: 2024-02-13 | End: 2024-02-14 | Stop reason: HOSPADM

## 2024-02-12 RX ORDER — FUROSEMIDE 40 MG/1
20 TABLET ORAL DAILY
Status: DISCONTINUED | OUTPATIENT
Start: 2024-02-13 | End: 2024-02-14 | Stop reason: HOSPADM

## 2024-02-12 RX ORDER — LIDOCAINE 40 MG/G
CREAM TOPICAL 4 TIMES DAILY PRN
Status: DISCONTINUED | OUTPATIENT
Start: 2024-02-12 | End: 2024-02-14 | Stop reason: HOSPADM

## 2024-02-12 RX ORDER — ENOXAPARIN SODIUM 100 MG/ML
40 INJECTION SUBCUTANEOUS DAILY
Status: DISCONTINUED | OUTPATIENT
Start: 2024-02-13 | End: 2024-02-14 | Stop reason: HOSPADM

## 2024-02-12 RX ORDER — ACETAMINOPHEN 325 MG/1
975 TABLET ORAL EVERY 8 HOURS SCHEDULED
Status: DISCONTINUED | OUTPATIENT
Start: 2024-02-12 | End: 2024-02-14 | Stop reason: HOSPADM

## 2024-02-12 RX ORDER — PROPRANOLOL HYDROCHLORIDE 20 MG/1
10 TABLET ORAL 3 TIMES DAILY
Status: DISCONTINUED | OUTPATIENT
Start: 2024-02-12 | End: 2024-02-12

## 2024-02-12 RX ORDER — LEVOTHYROXINE SODIUM 0.03 MG/1
25 TABLET ORAL
Status: DISCONTINUED | OUTPATIENT
Start: 2024-02-13 | End: 2024-02-14 | Stop reason: HOSPADM

## 2024-02-12 RX ORDER — AMLODIPINE BESYLATE 5 MG/1
5 TABLET ORAL DAILY
Status: DISCONTINUED | OUTPATIENT
Start: 2024-02-13 | End: 2024-02-14 | Stop reason: HOSPADM

## 2024-02-12 RX ORDER — ACETAMINOPHEN 325 MG/1
650 TABLET ORAL ONCE
Status: COMPLETED | OUTPATIENT
Start: 2024-02-12 | End: 2024-02-12

## 2024-02-12 RX ORDER — PROPRANOLOL HYDROCHLORIDE 20 MG/1
10 TABLET ORAL DAILY
Status: DISCONTINUED | OUTPATIENT
Start: 2024-02-13 | End: 2024-02-14 | Stop reason: HOSPADM

## 2024-02-12 RX ORDER — CEFAZOLIN SODIUM 2 G/50ML
2000 SOLUTION INTRAVENOUS EVERY 8 HOURS
Status: DISCONTINUED | OUTPATIENT
Start: 2024-02-12 | End: 2024-02-14 | Stop reason: HOSPADM

## 2024-02-12 RX ORDER — GABAPENTIN 300 MG/1
300 CAPSULE ORAL 3 TIMES DAILY
Status: DISCONTINUED | OUTPATIENT
Start: 2024-02-12 | End: 2024-02-14 | Stop reason: HOSPADM

## 2024-02-12 RX ORDER — CEPHALEXIN 250 MG/1
500 CAPSULE ORAL ONCE
Status: COMPLETED | OUTPATIENT
Start: 2024-02-12 | End: 2024-02-12

## 2024-02-12 RX ORDER — SERTRALINE HYDROCHLORIDE 25 MG/1
25 TABLET, FILM COATED ORAL DAILY
Status: DISCONTINUED | OUTPATIENT
Start: 2024-02-13 | End: 2024-02-14 | Stop reason: HOSPADM

## 2024-02-12 RX ADMIN — GABAPENTIN 300 MG: 300 CAPSULE ORAL at 20:51

## 2024-02-12 RX ADMIN — CEFAZOLIN SODIUM 2000 MG: 2 SOLUTION INTRAVENOUS at 15:43

## 2024-02-12 RX ADMIN — DEXTRAN 70, GLYCERIN, HYPROMELLOSE 1 DROP: 1; 2; 3 SOLUTION/ DROPS OPHTHALMIC at 20:50

## 2024-02-12 RX ADMIN — ACETAMINOPHEN 650 MG: 325 TABLET, FILM COATED ORAL at 12:48

## 2024-02-12 RX ADMIN — ACETAMINOPHEN 975 MG: 325 TABLET, FILM COATED ORAL at 19:51

## 2024-02-12 RX ADMIN — GABAPENTIN 300 MG: 300 CAPSULE ORAL at 17:41

## 2024-02-12 RX ADMIN — CEFAZOLIN SODIUM 2000 MG: 2 SOLUTION INTRAVENOUS at 22:56

## 2024-02-12 RX ADMIN — CEPHALEXIN 500 MG: 250 CAPSULE ORAL at 13:49

## 2024-02-12 NOTE — ASSESSMENT & PLAN NOTE
Reports a fall Wednesday of last week and subsequent pain Thursday morning into the weekend.  She denies any fevers or chills.  On exam she was noted to have cellulitic changes and placed on antibiotics.  X-rays are unremarkable for traumatic injury.  Doppler is unremarkable for DVT though official read pending.  Continue Ancef.  She is afebrile and I suspect less systemic and will be able to transition to oral meds soon  Consult PT  Trend WBC, and physical exam

## 2024-02-12 NOTE — ED NOTES
Patient resting in bed. Observed respirations even and unlabored. No distress noted, will continue to monitor.     Wilber Francisco RN  02/12/24 [id number]

## 2024-02-12 NOTE — H&P
[facility]  H&P  Name: Cheryl Vasquez 89 y.o. female I MRN: [id number]  Unit/Bed#: ED-41 I Date of Admission: 2/12/2024   Date of Service: 2/12/2024 I Hospital Day: 0      Assessment/Plan   * Cellulitis of right lower extremity  Assessment & Plan  Reports a fall Wednesday of last week and subsequent pain Thursday morning into the weekend.  She denies any fevers or chills.  On exam she was noted to have cellulitic changes and placed on antibiotics.  X-rays are unremarkable for traumatic injury.  Doppler is unremarkable for DVT though official read pending.  Continue Ancef  Consult PT  Trend WBC, and physical exam    Hypothyroidism  Assessment & Plan  Continue Synthroid and propranolol    Polyneuropathy  Assessment & Plan  Continue Neurontin dosing PTA    Glaucoma  Assessment & Plan  Continue PTA drops    HTN (hypertension)  Assessment & Plan  Continue amlodipine and lisinopril as well as Lasix.    Ambulatory dysfunction  Assessment & Plan  Consult PT           VTE Pharmacologic Prophylaxis:   High Risk (Score >/= 5) - Pharmacological DVT Prophylaxis Ordered: enoxaparin (Lovenox). Sequential Compression Devices Ordered.  Code Status: Prior DNR/DNI  Discussion with family: Updated  (cyn) via phone.    Anticipated Length of Stay: Patient will be admitted on an observation basis with an anticipated length of stay of less than 2 midnights secondary to cellulitis.    Total Time Spent on Date of Encounter in care of patient: 65 mins. This time was spent on one or more of the following: performing physical exam; counseling and coordination of care; obtaining or reviewing history; documenting in the medical record; reviewing/ordering tests, medications or procedures; communicating with other healthcare professionals and discussing with patient's family/caregivers.    Chief Complaint: right leg pain    History of Present Illness:  Cheryl Vasquez is a 89 y.o. female with a PMH  of HTN, glaucoma who presents with right leg pain.  She noted that she fell Wednesday while cleaning up and had a mechanical fall. She took a shower afterwards and she went to bed.  She woke the next day with increasing pain and felt she could not walk. She tried tylenol without as much relief and thus due to continued pain shew as transferred here for evaluations. She denies any LOC or head strike.    She denies any chest pain or sob. She denies any fever/chills. She denies any abdominal pain. She has a good appetite.    Review of Systems:  Review of Systems   Constitutional:  Negative for activity change, appetite change and fever.   HENT:  Negative for congestion and sore throat.    Respiratory:  Negative for cough, chest tightness, shortness of breath and wheezing.    Gastrointestinal:  Negative for abdominal pain, constipation and diarrhea.   Endocrine: Negative for cold intolerance and heat intolerance.   Genitourinary:  Negative for difficulty urinating and dysuria.   Musculoskeletal:  Positive for gait problem and myalgias.   Neurological:  Negative for dizziness, weakness and headaches.   Hematological:  Negative for adenopathy.   Psychiatric/Behavioral:  Negative for agitation and behavioral problems.        Past Medical and Surgical History:   Past Medical History:   Diagnosis Date    Abnormal gait     Anxiety     Arthritis     DJD (degenerative joint disease)     GERD (gastroesophageal reflux disease)     HTN (hypertension)     Hypertension     Hypothyroidism     Osteoporosis        History reviewed. No pertinent surgical history.    Meds/Allergies:  Prior to Admission medications    Medication Sig Start Date End Date Taking? Authorizing Provider   acetaminophen (TYLENOL) 325 mg tablet Take 3 tablets (975 mg total) by mouth every 8 (eight) hours 3/27/21   Myron Braden PA-C   amLODIPine-benazepril (LOTREL 5-20) 5-20 MG per capsule Take 1 capsule by mouth daily 2/9/22   MD Jose Wilson  0.2-0.5 % INSERT 1 DROP OPHTHALMICALLY EVERY 12 HOURS 12/14/21   Historical Provider, MD   cycloSPORINE (RESTASIS) 0.05 % ophthalmic emulsion 1 drop 2 (two) times a day    Historical Provider, MD   Diclofenac Sodium (VOLTAREN) 1 % Apply 2 g topically 2 (two) times a day as needed (Pain) 6/21/22   Ochoa Seay DO   Flaxseed, Linseed, (Flax Seed Oil) 1000 MG CAPS Take 1 tablet by mouth 2 (two) times a day    Historical Provider, MD   furosemide (LASIX) 20 mg tablet Take 20 mg by mouth daily 11/23/22   Historical Provider, MD   gabapentin (NEURONTIN) 300 mg capsule Take 300 mg by mouth 3 (three) times a day    Historical Provider, MD   levothyroxine 25 mcg tablet Take 25 mcg by mouth daily    Historical Provider, MD   lidocaine (LMX) 4 % cream Apply topically as needed for mild pain 6/21/22   Ochoa Seay DO   lisinopril (ZESTRIL) 20 mg tablet Take 20 mg by mouth daily 10/28/22   Historical Provider, MD   meloxicam (MOBIC) 15 mg tablet Take 1 tablet (15 mg total) by mouth daily for 14 days 8/13/22 8/27/22  Sukh Royal,    omeprazole (PriLOSEC) 20 mg delayed release capsule Take 20 mg by mouth daily    Historical Provider, MD   propranolol (INDERAL) 10 mg tablet Take 10 mg by mouth 3 (three) times a day    Historical Provider, MD   propranolol (INDERAL) 20 mg tablet Take 20 mg by mouth daily 11/30/22   Historical Provider, MD   sertraline (ZOLOFT) 25 mg tablet Take 25 mg by mouth daily    Historical Provider, MD   Zioptan 0.0015 % SOLN  1/14/22   Historical Provider, MD BUTTERFIELD have reviewed home medications using recent Epic encounter.    Allergies:   Allergies   Allergen Reactions    Iodinated Contrast Media Hives    Iodine - Food Allergy Other (See Comments)     shellfish and test dye    Nsaids GI Intolerance    Shellfish-Derived Products - Food Allergy Hives       Social History:  Marital Status:    Occupation: retired  Patient Pre-hospital Living Situation: Assisted Living  Patient Pre-hospital Level  of Mobility: walks with walker  Patient Pre-hospital Diet Restrictions: low salt  Substance Use History:   Social History     Substance and Sexual Activity   Alcohol Use Not Currently     Social History     Tobacco Use   Smoking Status Never   Smokeless Tobacco Never     Social History     Substance and Sexual Activity   Drug Use Never       Family History:  History reviewed. No pertinent family history.    Physical Exam:     Vitals:   Blood Pressure: 160/86 (02/12/24 1130)  Pulse: 77 (02/12/24 1130)  Temperature: 98.2 °F (36.8 °C) (02/12/24 1046)  Temp Source: Oral (02/12/24 1046)  Respirations: 18 (02/12/24 1130)  Weight - Scale: 80.4 kg (177 lb 4 oz) (02/12/24 1046)  SpO2: 92 % (02/12/24 1130)    Physical Exam  Vitals and nursing note reviewed.   Constitutional:       Appearance: Normal appearance. She is not ill-appearing or diaphoretic.   HENT:      Head: Normocephalic and atraumatic.      Nose: Nose normal. No congestion.      Mouth/Throat:      Mouth: Mucous membranes are moist.      Pharynx: No oropharyngeal exudate.   Eyes:      General: No scleral icterus.     Conjunctiva/sclera: Conjunctivae normal.      Comments: Wears glasses   Cardiovascular:      Rate and Rhythm: Normal rate and regular rhythm.      Heart sounds: No murmur heard.  Pulmonary:      Effort: Pulmonary effort is normal. No respiratory distress.      Breath sounds: No wheezing or rales.   Abdominal:      General: Bowel sounds are normal. There is no distension.      Palpations: Abdomen is soft.      Tenderness: There is no abdominal tenderness.   Genitourinary:     Comments: No reynolds  Musculoskeletal:         General: Swelling and tenderness present.      Cervical back: Normal range of motion and neck supple. No rigidity.      Right lower leg: Edema present.      Left lower leg: No edema.      Comments: Right LE swelling and erythema/warmth up to mid shin. No lacerations slight bruising noted   Skin:     General: Skin is warm.       Coloration: Skin is not jaundiced.      Findings: Erythema present.   Neurological:      Mental Status: She is alert and oriented to person, place, and time. Mental status is at baseline.      Gait: Gait abnormal.   Psychiatric:         Mood and Affect: Mood normal.         Behavior: Behavior normal.            Additional Data:     Lab Results:  Results from last 7 days   Lab Units 02/12/24  1346   WBC Thousand/uL 6.15   HEMOGLOBIN g/dL 12.8   HEMATOCRIT % 38.7   PLATELETS Thousands/uL 233   NEUTROS PCT % 72   LYMPHS PCT % 16   MONOS PCT % 8   EOS PCT % 3     Results from last 7 days   Lab Units 02/12/24  1346   SODIUM mmol/L 135   POTASSIUM mmol/L 4.8   CHLORIDE mmol/L 98   CO2 mmol/L 30   BUN mg/dL 23   CREATININE mg/dL 0.69   ANION GAP mmol/L 7   CALCIUM mg/dL 9.0   ALBUMIN g/dL 4.2   TOTAL BILIRUBIN mg/dL 0.39   ALK PHOS U/L 102   ALT U/L 11   AST U/L 16   GLUCOSE RANDOM mg/dL 94                       Lines/Drains:  Invasive Devices       Peripheral Intravenous Line  Duration             Peripheral IV 02/12/24 Right Antecubital <1 day                        Imaging: Personally reviewed the following imaging: xray(s)  VAS VENOUS DUPLEX -LOWER LIMB UNILATERAL   Final Result by Fidel Resendiz DO (02/12 1354)      XR femur 2 views RIGHT    (Results Pending)   XR tibia fibula 2 views RIGHT    (Results Pending)   XR ankle 3+ views RIGHT    (Results Pending)   XR pelvis ap only 1 or 2 vw    (Results Pending)   XR knee 1 or 2 vw right    (Results Pending)           ** Please Note: This note has been constructed using a voice recognition system. **

## 2024-02-12 NOTE — ED PROVIDER NOTES
History  Chief Complaint   Patient presents with    Fall     Pt was bending down and fell onto buttocks on Wednesday. Pain started yesterday from R thigh to R ankle. +Redness to R lower leg +warmth -HS -Thinners -LOC     89-year-old female with extensive PMH including HTN, GERD, arthritis, and osteoporosis who presents to the ED from a nursing facility for right lower extremity pain has been ongoing for several days.  Patient states she fell on Wednesday, 2024 while bending over to pick something up.  Patient denies any loss of consciousness or head strike and does not take any antiplatelet or anticoagulation therapy.  She states she is having pain behind her leg in her calf along with swelling and redness of her shin on the right lower extremity.  Patient does ambulate with walker at baseline and has been able to bear weight on her leg however, reduced since her injury.  Patient also states that she had previously broken this leg in the past. Denies chest pain, SOB, cough, abdominal pain, n/v/d, fever, chills, dizziness, lightheadedness, HA, dysuria, hematuria, hematochezia, or melena.           Prior to Admission Medications   Prescriptions Last Dose Informant Patient Reported? Taking?   Combigan 0.2-0.5 % Unknown Self Yes No   Sig: INSERT 1 DROP OPHTHALMICALLY EVERY 12 HOURS   Diclofenac Sodium (VOLTAREN) 1 % Unknown Self No No   Sig: Apply 2 g topically 2 (two) times a day as needed (Pain)   Flaxseed, Linseed, (Flax Seed Oil) 1000 MG CAPS Unknown Self Yes No   Sig: Take 1 tablet by mouth 2 (two) times a day   Zioptan 0.0015 % SOLN Unknown Self Yes No   acetaminophen (TYLENOL) 325 mg tablet Unknown Self No No   Sig: Take 3 tablets (975 mg total) by mouth every 8 (eight) hours   amLODIPine-benazepril (LOTREL 5-20) 5-20 MG per capsule Unknown Self No No   Sig: Take 1 capsule by mouth daily   cycloSPORINE (RESTASIS) 0.05 % ophthalmic emulsion Unknown Self Yes No   Si drop 2 (two) times a day   furosemide  (LASIX) 20 mg tablet Unknown Self Yes No   Sig: Take 20 mg by mouth daily   gabapentin (NEURONTIN) 300 mg capsule Unknown Self Yes No   Sig: Take 300 mg by mouth 3 (three) times a day   levothyroxine 25 mcg tablet Unknown Self Yes No   Sig: Take 25 mcg by mouth daily   lidocaine (LMX) 4 % cream Unknown Self No No   Sig: Apply topically as needed for mild pain   lisinopril (ZESTRIL) 20 mg tablet Unknown Self Yes No   Sig: Take 20 mg by mouth daily   meloxicam (MOBIC) 15 mg tablet   No No   Sig: Take 1 tablet (15 mg total) by mouth daily for 14 days   omeprazole (PriLOSEC) 20 mg delayed release capsule Unknown Self Yes No   Sig: Take 20 mg by mouth daily   propranolol (INDERAL) 10 mg tablet Unknown Self Yes No   Sig: Take 10 mg by mouth 3 (three) times a day   propranolol (INDERAL) 20 mg tablet Unknown Self Yes No   Sig: Take 20 mg by mouth daily   sertraline (ZOLOFT) 25 mg tablet Unknown Self Yes No   Sig: Take 25 mg by mouth daily      Facility-Administered Medications: None       Past Medical History:   Diagnosis Date    Abnormal gait     Anxiety     Arthritis     DJD (degenerative joint disease)     GERD (gastroesophageal reflux disease)     HTN (hypertension)     Hypertension     Hypothyroidism     Osteoporosis        History reviewed. No pertinent surgical history.    History reviewed. No pertinent family history.  I have reviewed and agree with the history as documented.    E-Cigarette/Vaping    E-Cigarette Use Never User      E-Cigarette/Vaping Substances     Social History     Tobacco Use    Smoking status: Never    Smokeless tobacco: Never   Vaping Use    Vaping status: Never Used   Substance Use Topics    Alcohol use: Not Currently    Drug use: Never        Review of Systems   Constitutional:  Negative for appetite change, chills, diaphoresis, fatigue and fever.   HENT:  Negative for congestion, ear pain, postnasal drip, rhinorrhea, sore throat and trouble swallowing.    Eyes:  Negative for pain and visual  disturbance.   Respiratory:  Negative for cough and shortness of breath.    Cardiovascular:  Negative for chest pain and palpitations.   Gastrointestinal:  Negative for abdominal pain, constipation, diarrhea, nausea and vomiting.   Genitourinary:  Negative for decreased urine volume, dysuria and hematuria.   Musculoskeletal:  Negative for arthralgias and back pain.   Skin:  Positive for color change. Negative for rash.        Redness and swelling of Right lower leg with pain   Neurological:  Negative for dizziness, seizures, syncope, weakness, light-headedness and headaches.   All other systems reviewed and are negative.      Physical Exam  ED Triage Vitals [02/12/24 1046]   Temperature Pulse Respirations Blood Pressure SpO2   98.2 °F (36.8 °C) 83 18 (!) 187/81 92 %      Temp Source Heart Rate Source Patient Position - Orthostatic VS BP Location FiO2 (%)   Oral Monitor Sitting Right arm --      Pain Score       9             Orthostatic Vital Signs  Vitals:    02/12/24 1100 02/12/24 1130 02/12/24 1549 02/12/24 1621   BP: 166/77 160/86 138/61 142/77   Pulse: 77 77 85 86   Patient Position - Orthostatic VS: Sitting Sitting Lying Lying       Physical Exam  Vitals and nursing note reviewed.   Constitutional:       Appearance: Normal appearance. She is normal weight.   HENT:      Head: Normocephalic and atraumatic.      Right Ear: External ear normal.      Left Ear: External ear normal.      Nose: Nose normal.      Mouth/Throat:      Pharynx: Oropharynx is clear.   Eyes:      Conjunctiva/sclera: Conjunctivae normal.   Cardiovascular:      Rate and Rhythm: Normal rate and regular rhythm.      Pulses: Normal pulses.      Heart sounds: Normal heart sounds.      Comments: RRR with +S1 and S2, no murmurs appreciated on exam. Peripheral pulses intact.    Pulmonary:      Effort: Pulmonary effort is normal.      Breath sounds: Normal breath sounds.      Comments: CTABL with no abnormal lung sounds such as wheezes or rales  appreciated on exam.     Abdominal:      General: Abdomen is flat. Bowel sounds are normal. There is no distension.      Palpations: Abdomen is soft.      Tenderness: There is no abdominal tenderness.      Comments: Soft, non tender, normo-active bowel sounds. Without rigidity, guarding, or distension.     Musculoskeletal:         General: Swelling and tenderness present. No deformity.      Cervical back: Normal range of motion.      Right lower leg: Edema present.      Left lower leg: Edema present.      Comments: Diffuse tenderness to palpation in Right lower extremity and most notable along posterior and anterior tibial surface with erythema and edema noted. Decreased ROM with active and passive movement of the RLE. No signs of abrasions, ecchymosis, or gross deformity was noted.       Skin:     General: Skin is warm and dry.   Neurological:      General: No focal deficit present.      Mental Status: She is alert and oriented to person, place, and time. Mental status is at baseline.      Comments: CN II-XII intact. No focal neurologic deficits noted on exam.  5/5 strength in all extremities. Neurovascularly intact with normal sensation and motor function.           ED Medications  Medications   Artificial Tears ophthalmic solution 1 drop (has no administration in time range)   acetaminophen (TYLENOL) tablet 975 mg (has no administration in time range)   amLODIPine (NORVASC) tablet 5 mg (has no administration in time range)     And   lisinopril (ZESTRIL) tablet 20 mg (has no administration in time range)   furosemide (LASIX) tablet 20 mg (has no administration in time range)   gabapentin (NEURONTIN) capsule 300 mg (300 mg Oral Given 2/12/24 6401)   levothyroxine tablet 25 mcg (has no administration in time range)   lidocaine (LMX) 4 % cream (has no administration in time range)   pantoprazole (PROTONIX) EC tablet 40 mg (has no administration in time range)   sertraline (ZOLOFT) tablet 25 mg (has no administration  in time range)   enoxaparin (LOVENOX) subcutaneous injection 40 mg (has no administration in time range)   ceFAZolin (ANCEF) IVPB (premix in dextrose) 2,000 mg 50 mL (2,000 mg Intravenous New Bag 2/12/24 1543)   propranolol (INDERAL) tablet 10 mg (has no administration in time range)   acetaminophen (TYLENOL) tablet 650 mg (650 mg Oral Given 2/12/24 1248)   cephalexin (KEFLEX) capsule 500 mg (500 mg Oral Given 2/12/24 1349)       Diagnostic Studies  Results Reviewed       Procedure Component Value Units Date/Time    Comprehensive metabolic panel [[id number]] Collected: 02/12/24 1346    Lab Status: Final result Specimen: Blood from Arm, Right Updated: 02/12/24 1407     Sodium 135 mmol/L      Potassium 4.8 mmol/L      Chloride 98 mmol/L      CO2 30 mmol/L      ANION GAP 7 mmol/L      BUN 23 mg/dL      Creatinine 0.69 mg/dL      Glucose 94 mg/dL      Calcium 9.0 mg/dL      AST 16 U/L      ALT 11 U/L      Alkaline Phosphatase 102 U/L      Total Protein 7.1 g/dL      Albumin 4.2 g/dL      Total Bilirubin 0.39 mg/dL      eGFR 75 ml/min/1.73sq m     Narrative:      [employer] guidelines for Chronic Kidney Disease (CKD):     Stage 1 with normal or high GFR (GFR > 90 mL/min/1.73 square meters)    Stage 2 Mild CKD (GFR = 60-89 mL/min/1.73 square meters)    Stage 3A Moderate CKD (GFR = 45-59 mL/min/1.73 square meters)    Stage 3B Moderate CKD (GFR = 30-44 mL/min/1.73 square meters)    Stage 4 Severe CKD (GFR = 15-29 mL/min/1.73 square meters)    Stage 5 End Stage CKD (GFR <15 mL/min/1.73 square meters)  Note: GFR calculation is accurate only with a steady state creatinine    CBC and differential [[id number]]  (Abnormal) Collected: 02/12/24 1346    Lab Status: Final result Specimen: Blood from Arm, Right Updated: 02/12/24 1353     WBC 6.15 Thousand/uL      RBC 3.84 Million/uL      Hemoglobin 12.8 g/dL      Hematocrit 38.7 %       fL      MCH 33.3 pg      MCHC 33.1 g/dL      RDW 13.6 %      MPV 9.5  fL      Platelets 233 Thousands/uL      nRBC 0 /100 WBCs      Neutrophils Relative 72 %      Immat GRANS % 0 %      Lymphocytes Relative 16 %      Monocytes Relative 8 %      Eosinophils Relative 3 %      Basophils Relative 1 %      Neutrophils Absolute 4.48 Thousands/µL      Immature Grans Absolute 0.02 Thousand/uL      Lymphocytes Absolute 0.98 Thousands/µL      Monocytes Absolute 0.48 Thousand/µL      Eosinophils Absolute 0.16 Thousand/µL      Basophils Absolute 0.03 Thousands/µL                    VAS VENOUS DUPLEX -LOWER LIMB UNILATERAL   Final Result by Fidel Resendiz DO (02/12 1354)      XR femur 2 views RIGHT   Final Result by Alessio Oconnell MD (02/12 1535)      No acute osseous abnormality.      Workstation performed: [id number]         XR tibia fibula 2 views RIGHT   Final Result by Alessio Oconnell MD (02/12 1532)      No acute osseous abnormality.            Workstation performed: [id number]         XR ankle 3+ views RIGHT   Final Result by Alessio Oconnell MD (02/12 1531)      No acute osseous abnormality.            Workstation performed: [id number]         XR pelvis ap only 1 or 2 vw   Final Result by Alessio Oconnell MD (02/12 1539)      No acute osseous abnormality.      Workstation performed: [id number]         XR knee 1 or 2 vw right   Final Result by Alessio Oconnell MD (02/12 1534)      No acute osseous abnormality.            Workstation performed: [id number]               Procedures  Procedures      ED Course  ED Course as of 02/12/24 1920   Mon Feb 12, 2024   1315 Negative for DVT in RLE as per US tech Lola Diane   1408 No acute concerns on lab work   1437 Spoke with Dr. Oropeza and patient was accepted for admission as inpatient under her service.   1548 No acute osseous abnormality noted on all x ray images as per radiology.                             SBIRT 22yo+      Flowsheet Row Most Recent Value   Initial Alcohol Screen: US AUDIT-C     1. How often do you have a drink containing alcohol? 0 Filed  at: 02/12/2024 1043   2. How many drinks containing alcohol do you have on a typical day you are drinking?  0 Filed at: 02/12/2024 1043   3b. FEMALE Any Age, or MALE 65+: How often do you have 4 or more drinks on one occassion? 0 Filed at: 02/12/2024 1043   Audit-C Score 0 Filed at: 02/12/2024 1043   PATRICIA: How many times in the past year have you...    Used an illegal drug or used a prescription medication for non-medical reasons? Never Filed at: 02/12/2024 1043                  Medical Decision Making  89-year-old female from a nursing facility with extensive PMH including HTN, GERD, arthritis, and osteoporosis who presented to the ED from a nursing facility for right lower extremity pain.  Patient states the pain has been ongoing ever since her mechanical fall on Wednesday, 2/7/2024 while bending over to pick something up.  Patient's labs and imaging was obtained and reviewed by the ED provider.  Please see ED course for more details about patient's ED stay.  Patient was given 650 mg of Tylenol and 500 mg of Keflex while in the emergency department.  A right lower extremity venous ultrasound was also obtained to evaluate for DVT which was negative.  Patient's right lower extremity also showed signs of cellulitis along the anterior tibial surface with erythema and pain to palpation.  Through shared decision making to the patient and the provider, the patient was planned for admission.  Patient was agreeable with this plan and was accepted under the service of Dr. Oropeza as inpatient.    Amount and/or Complexity of Data Reviewed  Labs: ordered.  Radiology: ordered.    Risk  OTC drugs.  Prescription drug management.  Decision regarding hospitalization.          Disposition  Final diagnoses:   Cellulitis of right leg     Time reflects when diagnosis was documented in both MDM as applicable and the Disposition within this note       Time User Action Codes Description Comment    2/12/2024  2:36 PM Sergo Suarez Add  [L03.115] Cellulitis of right leg           ED Disposition       ED Disposition   Admit    Condition   Stable    Date/Time   Mon Feb 12, 2024 2945    Comment   Case was discussed with Dr. Star Bergman and the patient's admission status was agreed to be Admission Status: inpatient status to the service of Dr. Star Bergman .               Follow-up Information    None         Current Discharge Medication List        CONTINUE these medications which have NOT CHANGED    Details   acetaminophen (TYLENOL) 325 mg tablet Take 3 tablets (975 mg total) by mouth every 8 (eight) hours  Refills: 0    Associated Diagnoses: Fibula fracture      amLODIPine-benazepril (LOTREL 5-20) 5-20 MG per capsule Take 1 capsule by mouth daily  Qty: 90 capsule, Refills: 1    Associated Diagnoses: Primary hypertension      Combigan 0.2-0.5 % INSERT 1 DROP OPHTHALMICALLY EVERY 12 HOURS      cycloSPORINE (RESTASIS) 0.05 % ophthalmic emulsion 1 drop 2 (two) times a day      Diclofenac Sodium (VOLTAREN) 1 % Apply 2 g topically 2 (two) times a day as needed (Pain)  Qty: 100 g, Refills: 0    Associated Diagnoses: Primary osteoarthritis of right knee      Flaxseed, Linseed, (Flax Seed Oil) 1000 MG CAPS Take 1 tablet by mouth 2 (two) times a day      furosemide (LASIX) 20 mg tablet Take 20 mg by mouth daily      gabapentin (NEURONTIN) 300 mg capsule Take 300 mg by mouth 3 (three) times a day      levothyroxine 25 mcg tablet Take 25 mcg by mouth daily      lidocaine (LMX) 4 % cream Apply topically as needed for mild pain  Qty: 30 g, Refills: 0    Associated Diagnoses: Primary osteoarthritis of right knee      lisinopril (ZESTRIL) 20 mg tablet Take 20 mg by mouth daily      meloxicam (MOBIC) 15 mg tablet Take 1 tablet (15 mg total) by mouth daily for 14 days  Qty: 14 tablet, Refills: 0    Associated Diagnoses: Pain of both shoulder joints      omeprazole (PriLOSEC) 20 mg delayed release capsule Take 20 mg by mouth daily      !! propranolol (INDERAL) 10  mg tablet Take 10 mg by mouth 3 (three) times a day      !! propranolol (INDERAL) 20 mg tablet Take 20 mg by mouth daily      sertraline (ZOLOFT) 25 mg tablet Take 25 mg by mouth daily      Zioptan 0.0015 % SOLN        !! - Potential duplicate medications found. Please discuss with provider.        No discharge procedures on file.    PDMP Review       None             ED Provider  Attending physically available and evaluated Cherylelaine Vasquez. I managed the patient along with the ED Attending.    Electronically Signed by           Sergo Suarez MD  02/12/24 5561

## 2024-02-13 LAB
ANION GAP SERPL CALCULATED.3IONS-SCNC: 7 MMOL/L
BASOPHILS # BLD AUTO: 0.02 THOUSANDS/ÂΜL (ref 0–0.1)
BASOPHILS NFR BLD AUTO: 0 % (ref 0–1)
BUN SERPL-MCNC: 22 MG/DL (ref 5–25)
CALCIUM SERPL-MCNC: 8.7 MG/DL (ref 8.4–10.2)
CHLORIDE SERPL-SCNC: 101 MMOL/L (ref 96–108)
CO2 SERPL-SCNC: 29 MMOL/L (ref 21–32)
CREAT SERPL-MCNC: 0.66 MG/DL (ref 0.6–1.3)
EOSINOPHIL # BLD AUTO: 0.2 THOUSAND/ÂΜL (ref 0–0.61)
EOSINOPHIL NFR BLD AUTO: 4 % (ref 0–6)
ERYTHROCYTE [DISTWIDTH] IN BLOOD BY AUTOMATED COUNT: 13.4 % (ref 11.6–15.1)
GFR SERPL CREATININE-BSD FRML MDRD: 76 ML/MIN/1.73SQ M
GLUCOSE SERPL-MCNC: 99 MG/DL (ref 65–140)
HCT VFR BLD AUTO: 36.2 % (ref 34.8–46.1)
HGB BLD-MCNC: 12 G/DL (ref 11.5–15.4)
IMM GRANULOCYTES # BLD AUTO: 0.01 THOUSAND/UL (ref 0–0.2)
IMM GRANULOCYTES NFR BLD AUTO: 0 % (ref 0–2)
LYMPHOCYTES # BLD AUTO: 1.25 THOUSANDS/ÂΜL (ref 0.6–4.47)
LYMPHOCYTES NFR BLD AUTO: 26 % (ref 14–44)
MCH RBC QN AUTO: 32.9 PG (ref 26.8–34.3)
MCHC RBC AUTO-ENTMCNC: 33.1 G/DL (ref 31.4–37.4)
MCV RBC AUTO: 99 FL (ref 82–98)
MONOCYTES # BLD AUTO: 0.65 THOUSAND/ÂΜL (ref 0.17–1.22)
MONOCYTES NFR BLD AUTO: 14 % (ref 4–12)
NEUTROPHILS # BLD AUTO: 2.64 THOUSANDS/ÂΜL (ref 1.85–7.62)
NEUTS SEG NFR BLD AUTO: 56 % (ref 43–75)
NRBC BLD AUTO-RTO: 0 /100 WBCS
PLATELET # BLD AUTO: 204 THOUSANDS/UL (ref 149–390)
PMV BLD AUTO: 8.8 FL (ref 8.9–12.7)
POTASSIUM SERPL-SCNC: 4.2 MMOL/L (ref 3.5–5.3)
RBC # BLD AUTO: 3.65 MILLION/UL (ref 3.81–5.12)
SODIUM SERPL-SCNC: 137 MMOL/L (ref 135–147)
WBC # BLD AUTO: 4.77 THOUSAND/UL (ref 4.31–10.16)

## 2024-02-13 PROCEDURE — 85025 COMPLETE CBC W/AUTO DIFF WBC: CPT | Performed by: INTERNAL MEDICINE

## 2024-02-13 PROCEDURE — 80048 BASIC METABOLIC PNL TOTAL CA: CPT | Performed by: INTERNAL MEDICINE

## 2024-02-13 PROCEDURE — 99232 SBSQ HOSP IP/OBS MODERATE 35: CPT | Performed by: INTERNAL MEDICINE

## 2024-02-13 PROCEDURE — 97163 PT EVAL HIGH COMPLEX 45 MIN: CPT

## 2024-02-13 RX ADMIN — GABAPENTIN 300 MG: 300 CAPSULE ORAL at 09:20

## 2024-02-13 RX ADMIN — LISINOPRIL 20 MG: 20 TABLET ORAL at 09:20

## 2024-02-13 RX ADMIN — PROPRANOLOL HYDROCHLORIDE 10 MG: 20 TABLET ORAL at 09:20

## 2024-02-13 RX ADMIN — GABAPENTIN 300 MG: 300 CAPSULE ORAL at 16:08

## 2024-02-13 RX ADMIN — CEFAZOLIN SODIUM 2000 MG: 2 SOLUTION INTRAVENOUS at 16:08

## 2024-02-13 RX ADMIN — AMLODIPINE BESYLATE 5 MG: 5 TABLET ORAL at 09:20

## 2024-02-13 RX ADMIN — SERTRALINE HYDROCHLORIDE 25 MG: 25 TABLET ORAL at 09:20

## 2024-02-13 RX ADMIN — LEVOTHYROXINE SODIUM 25 MCG: 25 TABLET ORAL at 05:00

## 2024-02-13 RX ADMIN — PANTOPRAZOLE SODIUM 40 MG: 40 TABLET, DELAYED RELEASE ORAL at 05:00

## 2024-02-13 RX ADMIN — ENOXAPARIN SODIUM 40 MG: 40 INJECTION SUBCUTANEOUS at 09:20

## 2024-02-13 RX ADMIN — GABAPENTIN 300 MG: 300 CAPSULE ORAL at 21:24

## 2024-02-13 RX ADMIN — ACETAMINOPHEN 975 MG: 325 TABLET, FILM COATED ORAL at 12:21

## 2024-02-13 RX ADMIN — ACETAMINOPHEN 975 MG: 325 TABLET, FILM COATED ORAL at 19:44

## 2024-02-13 RX ADMIN — FUROSEMIDE 20 MG: 40 TABLET ORAL at 09:20

## 2024-02-13 RX ADMIN — DEXTRAN 70, GLYCERIN, HYPROMELLOSE 1 DROP: 1; 2; 3 SOLUTION/ DROPS OPHTHALMIC at 21:24

## 2024-02-13 RX ADMIN — DEXTRAN 70, GLYCERIN, HYPROMELLOSE 1 DROP: 1; 2; 3 SOLUTION/ DROPS OPHTHALMIC at 09:20

## 2024-02-13 RX ADMIN — CEFAZOLIN SODIUM 2000 MG: 2 SOLUTION INTRAVENOUS at 07:08

## 2024-02-13 RX ADMIN — ACETAMINOPHEN 975 MG: 325 TABLET, FILM COATED ORAL at 05:00

## 2024-02-13 NOTE — PHYSICAL THERAPY NOTE
PHYSICAL THERAPY EVALUATION  NAME:  Cheryl Vasquez  DATE: 02/13/24    AGE:   89 y.o.  Mrn:   [id number]  Principal problem: Principal Problem:    Cellulitis of right lower extremity  Active Problems:    Ambulatory dysfunction    HTN (hypertension)    Glaucoma    Polyneuropathy    Hypothyroidism      Vitals:    02/12/24 2225 02/13/24 0745 02/13/24 0920 02/13/24 1249   BP: 149/85 143/96 143/96 107/70   BP Location: Right arm Left arm     Pulse: 91   84   Resp: (!) 26 18  16   Temp: 97.8 °F (36.6 °C) 98.2 °F (36.8 °C)  98.6 °F (37 °C)   TempSrc: Oral Oral     SpO2: 92%   (!) 89%   Weight:       Height:           Length Of Stay: 1  Performed at least 2 patient identifiers during session: Name and Epic photo  PHYSICAL THERAPY EVALUATION :    02/13/24 1158   PT Last Visit   PT Visit Date 02/13/24   Note Type   Note type Evaluation   Pain Assessment   Pain Assessment Tool FLACC   Pain Score   (initially denies pain @ RLE, but later does report TTP @ R lower leg and arthritis pain.)   Pain Location/Orientation Orientation: Right;Location: Leg;Orientation: Lower   Effect of Pain on Daily Activities limits speed and indep of mobility   Patient's Stated Pain Goal No pain   Hospital Pain Intervention(s) Repositioned;Ambulation/increased activity;Emotional support   Pain Rating: FLACC (Rest) - Face 0   Pain Rating: FLACC (Rest) - Legs 1   Pain Rating: FLACC (Rest) - Activity 1   Pain Rating: FLACC (Rest) - Cry 1   Pain Rating: FLACC (Rest) - Consolability 0   Score: FLACC (Rest) 3   Pain Rating: FLACC (Activity) - Face 1   Pain Rating: FLACC (Activity) - Legs 1   Pain Rating: FLACC (Activity) - Activity 1   Pain Rating: FLACC (Activity) - Cry 1   Pain Rating: FLACC (Activity) - Consolability 1   Score: FLACC (Activity) 5   Restrictions/Precautions   Weight Bearing Precautions Per Order No   Other Precautions Cognitive;Fall Risk;Pain;Hard of hearing;Visual impairment;Bed Alarm;Chair Alarm   Home Living   Type of Home Assisted  "living   Home Layout One level;Elevator  (Cheryl reports living on the second floor of an assisted living where she needs to take the elevator down to the first floor 3 times a day for meals.  She reports using a rollator walker in the community)   Home Equipment Walker  (Rollator walker)   Prior Function   Level of [place] Independent with ADLs;Independent with functional mobility   Lives With Facility staff   IADLs Family/Friend/Other provides transportation;Family/Friend/Other provides medication management;Family/Friend/Other provides meals   Falls in the last 6 months 1 to 4   General   Family/Caregiver Present No   Cognition   Overall Cognitive Status WFL   Arousal/Participation Alert   Orientation Level Oriented to person;Oriented to place   Following Commands Follows one step commands with increased time or repetition   Subjective   Subjective Patient pleasant and cooperative.  Upon introduction, patient reports \"I have been waiting for you!\"   RUE Assessment   RUE Assessment WFL   LUE Assessment   LUE Assessment WFL   Strength RLE   R Hip Flexion 4/5   R Knee Extension 4-/5  (limited by pain)   R Ankle Dorsiflexion   (tested to 3)   Strength LLE   L Hip Flexion 4/5   L Knee Extension 4/5   L Ankle Dorsiflexion 4/5   Vision-Basic Assessment   Current Vision Wears glasses all the time   Light Touch   RLE Light Touch Grossly intact   LLE Light Touch Grossly intact   Bed Mobility   Supine to Sit 4  Minimal assistance   Additional items Assist x 1;Increased time required;Bedrails;Verbal cues   Sit to Supine Unable to assess   Transfers   Sit to Stand 5  Supervision   Additional items Assist x 1;Verbal cues;Armrests   Stand to Sit 5  Supervision   Additional items Assist x 2;Increased time required;Armrests;Assist x 1   Ambulation/Elevation   Gait pattern Decreased R stance;Step through pattern;Excessively slow  (initially step through, progressing to step to for improved gait quality)   Gait Assistance 4  " Minimal assist  (steadying A progressing to close S)   Additional items Assist x 1;Verbal cues   Assistive Device Rolling walker   Distance 100' total with standing rests followed by 20'   Stair Management Assistance Not tested   Balance   Static Sitting Good   Static Standing Fair   Ambulatory Fair -   Endurance Deficit   Endurance Deficit Yes   Endurance Deficit Description limited amb distance   Activity Tolerance   Activity Tolerance Patient limited by pain;Patient limited by fatigue   Nurse Made Aware spoke to nursing     Assessment:   Pt is a 89 y.o. female seen for PT evaluation s/p admit to [facility] on 2/12/2024 w/ Cellulitis of right lower extremity.  Order placed for PT.      Prior to admission: Pt lives at the [facility] and lives on the second floor, takes an elevator to her meals 3 times a day.  She uses a rollator for ambulation and had 1 recent fall.  Upon evaluation: Pt needed min assist for bed mobility from her hospital bed, but no physical assistance for transfers.  By the end of evaluation, she was relating with only close supervision using a rolling walker performing step to gait.    Pt's clinical presentation is currently unstable/unpredictable given the functional mobility deficits above, especially (but not limited to) gait deviations and pain, and combined with medical complications including fear/retreat.  Pt IS NOT at his/her mobility baseline.  She is at risk for falls based on hx of falls and gait deviations with inconsitent enedina and limited LLE clearance.       During this admission, pt would benefit from continued skilled inpatient PT in the acute care setting in order to address deficits as defined above to maximize function and mobility.      Recommendations:    From a PT standpoint, recommend next several sessions focus on continued mobility training using a rolling walker (has rollator at facility), balance strategies for managing in elevator  Recommend case  management to determine if facility can provide some support to pt to minimize her amb distance for a short period of time and/or HHPT    Nursing Recommendations:   Mobility Plan as of 02/13/24: Pt is one Assist with RW to/from bathroom and hallway.    Prognosis Fair   Problem List Decreased strength;Decreased range of motion;Decreased endurance;Impaired balance;Decreased mobility;Pain;Obesity;Decreased skin integrity;Decreased coordination  (gait deviations)   Barriers to Discharge Inaccessible home environment;Decreased caregiver support  (Pt was walking to/from meals 3x/day including taking elevator)   Goals   Patient Goals to get back home today   STG Expiration Date 02/23/24   Short Term Goal #1 Goals: Pt will: Perform rolling  and supine<>sit bed mobility tasks with modified I to prepare for transfers and reposition in bed. Perform transfers with modified I to promote proper hand placement and approach. Perform ambulation with LRAD for up to 150' w/self regulated rest breaks with Supervision to increase Indep in prior living environment and promote proper use of assistive device. Perform standing tolerance in elevator for at least one min w/ UE support or DME to demonstrate decreased risk for falls. Pt to perform improved LLE clearance at least 50% of gait trial w/walker to promote more normalized gait patter   PT Treatment Day 1   Plan   Treatment/Interventions Functional transfer training;LE strengthening/ROM;Therapeutic exercise;Endurance training;Cognitive reorientation;Patient/family training;Equipment eval/education;Bed mobility;Gait training;Elevations  (elevations re: elevator tolerance)   PT Frequency 2-3x/wk   Discharge Recommendation   Rehab Resource Intensity Level, PT III (Minimum Resource Intensity)   Equipment Recommended Walker   Additional Comments Co-morbidities affecting pt's physical performance at time of assessment include but are not limited to: DJD, HTN, OP, abnormal gait. Personal  "factors affecting pt at time of IE include: limited home support, advanced age, past experience, inability to perform IADLs, inability to ambulate household distances, inability to navigate community distances, and recent fall(s).   Additional Comments 2 Nursing Recommendations:    Mobility Plan as of 02/13/24: Pt is one Assist with RW to/from bathroom and hallway.   -[facility] Basic Mobility Inpatient   Turning in Flat Bed Without Bedrails 3   Lying on Back to Sitting on Edge of Flat Bed Without Bedrails 3   Moving Bed to Chair 3   Standing Up From Chair Using Arms 3   Walk in Room 3   Climb 3-5 Stairs With Railing 1   Basic Mobility Inpatient Raw Score 16   Basic Mobility Standardized Score 38.32   Highest Level Of Mobility   JH-HLM Goal 5: Stand one or more mins   JH-HLM Achieved 7: Walk 25 feet or more   End of Consult   Patient Position at End of Consult All needs within reach;Bed/Chair alarm activated;Bedside chair   (Please find full objective findings from PT assessment regarding body systems outlined above).     The patient's -[facility] Basic Mobility Inpatient Short Form Raw Score is 16. A Raw score of less than or equal to 16 suggests the patient may benefit from discharge to post-acute rehabilitation services, which MAY NOT coincide with CURRENT above PT recommendations pending facilities ability to accommodate pt's needs upon DC (Pt also does not have stairs).     However please refer to therapist recommendation for discharge planning given other factors that may influence destination.     Adapted from Ugo MONTEZ, Kelvin MOSQUERA, Kevyn J, Violet MOSQUERA. [facility] “6-Clicks” Basic Mobility and Daily Activity Scores With Discharge Destination. Physical Therapy, 2021;101:1-9. DOI: [email or web address]    Portions of the record may have been created with voice recognition software.  Occasional wrong word or \"sound a like\" substitutions may have occurred due to the inherent limitations of voice recognition " software.  Read the chart carefully and recognize, using context, where substitutions have occurred

## 2024-02-13 NOTE — PROGRESS NOTES
[facility]  Progress Note  Name: Cheryl Vasquez I  MRN: [id number]  Unit/Bed#: W -01 I Date of Admission: 2024   Date of Service: 2024 I Hospital Day: 1    Assessment/Plan   * Cellulitis of right lower extremity  Assessment & Plan  Reports a fall Wednesday of last week and subsequent pain Thursday morning into the weekend.  She denies any fevers or chills.  On exam she was noted to have cellulitic change  Continue Ancef  Has since significantly improved  WBC count within normal limits  Anticipate likely transition to oral antibiotics tomorrow  Await PT/OT eval    Hypothyroidism  Assessment & Plan  Continue Synthroid    Polyneuropathy  Assessment & Plan  Continue Neurontin    Glaucoma  Assessment & Plan  Pta drops    Ambulatory dysfunction  Assessment & Plan  Reports difficulty walking due to right lower extremity cellulitis/pain  PT/OT eval  Patient from assisted living facility  Can likely be discharged back to facility tomorrow if no need for rehab            VTE Pharmacologic Prophylaxis:   Pharmacologic: Enoxaparin (Lovenox)  Mechanical VTE Prophylaxis in Place: Yes    Patient Centered Rounds: I have performed bedside rounds with nursing staff today.    Discussions with Specialists or Other Care Team Provider: cm, nursing    Education and Discussions with Family / Patient: pt    Time Spent for Care: 30 minutes.  More than 50% of total time spent on counseling and coordination of care as described above.    Current Length of Stay: 1 day(s)    Current Patient Status: Inpatient   Certification Statement: The patient will continue to require additional inpatient hospital stay due to see below    Discharge Plan: still requiring iv abx. Anticipate medically clear tomorrow and pending pt/ot eval     Code Status: Level 3 - DNAR and DNI      Subjective:   Denies cp, sob, cough, nausea    Objective:     Vitals:   Temp (24hrs), Av °F (36.7 °C), Min:97.7 °F (36.5 °C), Max:98.2  °F (36.8 °C)    Temp:  [97.7 °F (36.5 °C)-98.2 °F (36.8 °C)] 98.2 °F (36.8 °C)  HR:  [77-91] 91  Resp:  [17-26] 18  BP: (138-187)/(61-96) 143/96  SpO2:  [92 %-93 %] 92 %  Body mass index is 30.42 kg/m².     Input and Output Summary (last 24 hours):       Intake/Output Summary (Last 24 hours) at 2/13/2024 1019  Last data filed at 2/12/2024 1725  Gross per 24 hour   Intake --   Output 1213 ml   Net -1213 ml       Physical Exam:     Physical Exam  Constitutional:       General: She is not in acute distress.     Appearance: She is well-developed. She is not diaphoretic.   HENT:      Head: Normocephalic and atraumatic.      Nose: Nose normal.      Mouth/Throat:      Pharynx: No oropharyngeal exudate.   Eyes:      General: No scleral icterus.        Right eye: No discharge.         Left eye: No discharge.      Conjunctiva/sclera: Conjunctivae normal.   Neck:      Thyroid: No thyromegaly.      Vascular: No JVD.   Cardiovascular:      Rate and Rhythm: Normal rate and regular rhythm.      Heart sounds: Normal heart sounds. No murmur heard.     No friction rub. No gallop.   Pulmonary:      Effort: Pulmonary effort is normal. No respiratory distress.      Breath sounds: Normal breath sounds. No wheezing or rales.   Chest:      Chest wall: No tenderness.   Abdominal:      General: Bowel sounds are normal. There is no distension.      Palpations: Abdomen is soft.      Tenderness: There is no abdominal tenderness. There is no guarding or rebound.   Musculoskeletal:         General: No tenderness or deformity. Normal range of motion.      Cervical back: Normal range of motion and neck supple.   Skin:     General: Skin is warm and dry.      Findings: No erythema or rash.   Neurological:      Mental Status: She is alert. Mental status is at baseline.      Cranial Nerves: No cranial nerve deficit.      Sensory: No sensory deficit.      Motor: No abnormal muscle tone.      Coordination: Coordination normal.           Additional Data:      Labs:    Results from last 7 days   Lab Units 02/13/24  0519   WBC Thousand/uL 4.77   HEMOGLOBIN g/dL 12.0   HEMATOCRIT % 36.2   PLATELETS Thousands/uL 204   NEUTROS PCT % 56   LYMPHS PCT % 26   MONOS PCT % 14*   EOS PCT % 4     Results from last 7 days   Lab Units 02/13/24  0519 02/12/24  1346   SODIUM mmol/L 137 135   POTASSIUM mmol/L 4.2 4.8   CHLORIDE mmol/L 101 98   CO2 mmol/L 29 30   BUN mg/dL 22 23   CREATININE mg/dL 0.66 0.69   ANION GAP mmol/L 7 7   CALCIUM mg/dL 8.7 9.0   ALBUMIN g/dL  --  4.2   TOTAL BILIRUBIN mg/dL  --  0.39   ALK PHOS U/L  --  102   ALT U/L  --  11   AST U/L  --  16   GLUCOSE RANDOM mg/dL 99 94                           * I Have Reviewed All Lab Data Listed Above.  * Additional Pertinent Lab Tests Reviewed: All Labs Within Last 24 Hours Reviewed    Imaging:    Imaging Reports Reviewed Today Include: na  Imaging Personally Reviewed by Myself Includes:  na    Recent Cultures (last 7 days):           Last 24 Hours Medication List:   Current Facility-Administered Medications   Medication Dose Route Frequency Provider Last Rate    acetaminophen  975 mg Oral Q8H [facility] Zoraida Aguilera MD      amLODIPine  5 mg Oral Daily Zoraida Aguilera MD      And    lisinopril  20 mg Oral Daily Zoraida Aguilera MD      Artificial Tears  1 drop Both Eyes Q12H [facility] Zoraida Aguilera MD      cefazolin  2,000 mg Intravenous Q8H Zoraida Aguilera MD 2,000 mg (02/13/24 0708)    enoxaparin  40 mg Subcutaneous Daily Zoraida Aguilera MD      furosemide  20 mg Oral Daily Zoraida Aguilera MD      gabapentin  300 mg Oral TID Zoraida Aguilera MD      levothyroxine  25 mcg Oral Early Morning Zoraida Aguilera MD      lidocaine   Topical 4x Daily PRN Zoraida Aguilera MD      pantoprazole  40 mg Oral Early Morning Zoraida Aguilera MD      propranolol  10 mg Oral Daily Zoraida Aguilera MD      sertraline  25 mg Oral Daily Zoraida Aguilera MD           Today, Patient Was Seen By: Dominic Zaragoza MD    ** Please Note: Dictation voice to text software may have been used in the creation of this document. **

## 2024-02-13 NOTE — ASSESSMENT & PLAN NOTE
Reports a fall Wednesday of last week and subsequent pain Thursday morning into the weekend.  She denies any fevers or chills.  On exam she was noted to have cellulitic change  Continue Ancef  Has since significantly improved  WBC count within normal limits  Anticipate likely transition to oral antibiotics tomorrow  Await PT/OT lupillo

## 2024-02-13 NOTE — PLAN OF CARE
Problem: PHYSICAL THERAPY ADULT  Goal: Performs mobility at highest level of function for planned discharge setting.  See evaluation for individualized goals.  Description: Treatment/Interventions: Functional transfer training, LE strengthening/ROM, Therapeutic exercise, Endurance training, Cognitive reorientation, Patient/family training, Equipment eval/education, Bed mobility, Gait training, Elevations (elevations re: elevator tolerance)  Equipment Recommended: Walker       See flowsheet documentation for full assessment, interventions and recommendations.  Note: Prognosis: Fair  Problem List: Decreased strength, Decreased range of motion, Decreased endurance, Impaired balance, Decreased mobility, Pain, Obesity, Decreased skin integrity, Decreased coordination (gait deviations)  Assessment: Pt is a 89 y.o. female seen for PT evaluation s/p admit to [facility] on 2/12/2024 w/ Cellulitis of right lower extremity.  Order placed for PT.      Prior to admission: Pt lives at the [facility] and lives on the second floor, takes an elevator to her meals 3 times a day.  She uses a rollator for ambulation and had 1 recent fall.  Upon evaluation: Pt needed min assist for bed mobility from her hospital bed, but no physical assistance for transfers.  By the end of evaluation, she was relating with only close supervision using a rolling walker performing step to gait.    Pt's clinical presentation is currently unstable/unpredictable given the functional mobility deficits above, especially (but not limited to) gait deviations and pain, and combined with medical complications including fear/retreat.  Pt IS NOT at his/her mobility baseline.  She is at risk for falls based on hx of falls and gait deviations with inconsitent enedina and limited LLE clearance .       During this admission, pt would benefit from continued skilled inpatient PT in the acute care setting in order to address deficits as defined above to  maximize function and mobility.      Recommendations:     From a PT standpoint, recommend next several sessions focus on continued mobility training using a rolling walker (has rollator at facility), balance strategies for managing in elevator   Recommend case management to determine if facility can provide some support to pt to minimize her amb distance for a short period of time and/or HHPT    Nursing Recommendations:    Mobility Plan as of 02/13/24: Pt is one Assist with RW to/from bathroom and hallway.  Barriers to Discharge: Inaccessible home environment, Decreased caregiver support (Pt was walking to/from meals 3x/day including taking elevator)     Rehab Resource Intensity Level, PT: III (Minimum Resource Intensity)    See flowsheet documentation for full assessment.

## 2024-02-13 NOTE — UTILIZATION REVIEW
Initial Clinical Review    Admission: Date/Time/Statement:   Admission Orders (From admission, onward)       Ordered        02/12/24 1437  INPATIENT ADMISSION  Once                          Orders Placed This Encounter   Procedures    INPATIENT ADMISSION     Standing Status:   Standing     Number of Occurrences:   1     Order Specific Question:   Level of Care     Answer:   Med Surg [16]     Order Specific Question:   Estimated length of stay     Answer:   More than 2 Midnights     Order Specific Question:   Certification     Answer:   I certify that inpatient services are medically necessary for this patient for a duration of greater than two midnights. See H&P and MD Progress Notes for additional information about the patient's course of treatment.     ED Arrival Information       Expected   -    Arrival   2/12/2024 10:41    Acuity   Urgent              Means of arrival   Ambulance    Escorted by   [facility]    Service   Hospitalist    Admission type   Emergency              Arrival complaint   Knee pain             Chief Complaint   Patient presents with    Fall     Pt was bending down and fell onto buttocks on Wednesday. Pain started yesterday from R thigh to R ankle. +Redness to R lower leg +warmth -HS -Thinners -LOC       Initial Presentation: 89 y.o. female with hx HTN, glaucoma , GERD, osteoporosis, arthritis who presents to ED from assisted living  facility via EMS with right leg pain.  She noted that she had mechanical fall on  Wednesday and  woke the next day with increasing pain and felt she could not walk. She tried tylenol without as much relief . Denies head strike or LOC. On exam, BP elevated . RLE swelling , erythema, warmth with slight bruising noted . Abnormal gait . Labs unremarkable . Imaging neg for traumatic injury. Pt given IV abx, Tylenol in ED. Admitted as Inpatient with cellulitis RLE . Ambulatory dysfunction . Plan - IV Ancef, PT consult. Trend WBC . F/U  venous duplex RLE . Continue  home anti hypertensives .     Date: 2/13    Day 2:    WBC WNL.  Continue IV Ancef . RLE improving . Venous duplex RLE neg for DVT . Await PT/OT eval. Reports difficulty walking due to right lower extremity cellulitis/pain .Inderal started today . Monitor BP.       ED Triage Vitals [02/12/24 1046]   Temperature Pulse Respirations Blood Pressure SpO2   98.2 °F (36.8 °C) 83 18 (!) 187/81 92 %      Temp Source Heart Rate Source Patient Position - Orthostatic VS BP Location FiO2 (%)   Oral Monitor Sitting Right arm --      Pain Score       9          Wt Readings from Last 1 Encounters:   02/12/24 80.4 kg (177 lb 4 oz)     Additional Vital Signs:   Date/Time Temp Pulse Resp BP MAP (mmHg) SpO2   02/13/24 0920 -- -- -- 143/96 -- --   02/13/24 0900 -- -- -- -- -- --   02/13/24 0745 98.2 °F (36.8 °C) -- 18 143/96 112 --   02/12/24 22:25:46 97.8 °F (36.6 °C) 91 26 Abnormal  149/85 106 92 %   02/12/24 16:21:45 97.7 °F (36.5 °C) 86 26 Abnormal  142/77 99 92 %   02/12/24 1549 -- 85 17 138/61 -- 93 %   02/12/24 1130 -- 77 18 160/86 116 92 %   02/12/24 1100 -- 77 18 166/77 111 92 %     Pertinent Labs/Diagnostic Test Results:   VAS VENOUS DUPLEX -LOWER LIMB UNILATERAL   Final Result by Fidel Resendiz DO (02/12 9764)   RIGHT LOWER LIMB  No evidence of acute or chronic deep vein thrombosis .  No evidence of superficial thrombophlebitis noted.  Doppler evaluation shows a normal response to augmentation maneuvers.  Popliteal, posterior tibial and anterior tibial arterial Doppler waveforms are  triphasic.  Hypoechoic fluid-like substance noted around the anterior aspect of the knee.     LEFT LOWER LIMB LIMITED  Evaluation shows no evidence of thrombus in the common femoral vein.  Doppler evaluation shows a normal response to augmentation maneuvers.             XR femur 2 views RIGHT   Final Result by Alessio Oconnell MD (02/12 8095)      No acute osseous abnormality.      Workstation performed: [id number]         XR tibia fibula 2 views  RIGHT   Final Result by Alessio Oconnell MD (02/12 1532)      No acute osseous abnormality.            Workstation performed: [id number]         XR ankle 3+ views RIGHT   Final Result by Alessio Oconnell MD (02/12 1531)      No acute osseous abnormality.            Workstation performed: [id number]         XR pelvis ap only 1 or 2 vw   Final Result by Alessio Oconnell MD (02/12 1539)      No acute osseous abnormality.      Workstation performed: [id number]         XR knee 1 or 2 vw right   Final Result by Alessio Oconnell MD (02/12 1534)      No acute osseous abnormality.            Workstation performed: [id number]               Results from last 7 days   Lab Units 02/13/24  0519 02/12/24  1346   WBC Thousand/uL 4.77 6.15   HEMOGLOBIN g/dL 12.0 12.8   HEMATOCRIT % 36.2 38.7   PLATELETS Thousands/uL 204 233   NEUTROS ABS Thousands/µL 2.64 4.48         Results from last 7 days   Lab Units 02/13/24  0519 02/12/24  1346   SODIUM mmol/L 137 135   POTASSIUM mmol/L 4.2 4.8   CHLORIDE mmol/L 101 98   CO2 mmol/L 29 30   ANION GAP mmol/L 7 7   BUN mg/dL 22 23   CREATININE mg/dL 0.66 0.69   EGFR ml/min/1.73sq m 76 75   CALCIUM mg/dL 8.7 9.0     Results from last 7 days   Lab Units 02/12/24  1346   AST U/L 16   ALT U/L 11   ALK PHOS U/L 102   TOTAL PROTEIN g/dL 7.1   ALBUMIN g/dL 4.2   TOTAL BILIRUBIN mg/dL 0.39         Results from last 7 days   Lab Units 02/13/24  0519 02/12/24  1346   GLUCOSE RANDOM mg/dL 99 94                     ED Treatment:   Medication Administration from 02/12/2024 1041 to 02/12/2024 1613         Date/Time Order Dose Route Action     02/12/2024 1248 EST acetaminophen (TYLENOL) tablet 650 mg 650 mg Oral Given     02/12/2024 1349 EST cephalexin (KEFLEX) capsule 500 mg 500 mg Oral Given     02/12/2024 1543 EST ceFAZolin (ANCEF) IVPB (premix in dextrose) 2,000 mg 50 mL 2,000 mg Intravenous New Bag          Past Medical History:   Diagnosis Date    Abnormal gait     Anxiety     Arthritis     DJD (degenerative joint  disease)     GERD (gastroesophageal reflux disease)     HTN (hypertension)     Hypertension     Hypothyroidism     Osteoporosis      Present on Admission:   Cellulitis of right lower extremity   Ambulatory dysfunction   HTN (hypertension)   Glaucoma   Polyneuropathy   Hypothyroidism      Admitting Diagnosis: Knee pain [M25.569]  Cellulitis of right leg [L03.115]  Age/Sex: 89 y.o. female  Admission Orders:  Scheduled Medications:  acetaminophen, 975 mg, Oral, Q8H KATHIE  amLODIPine, 5 mg, Oral, Daily   And  lisinopril, 20 mg, Oral, Daily  Artificial Tears, 1 drop, Both Eyes, Q12H KATHIE  cefazolin, 2,000 mg, Intravenous, Q8H  enoxaparin, 40 mg, Subcutaneous, Daily  furosemide, 20 mg, Oral, Daily  gabapentin, 300 mg, Oral, TID  levothyroxine, 25 mcg, Oral, Early Morning  pantoprazole, 40 mg, Oral, Early Morning  propranolol, 10 mg, Oral, Daily  sertraline, 25 mg, Oral, Daily      Continuous IV Infusions:     PRN Meds:  lidocaine, , Topical, 4x Daily PRN      SCD   OOB as joon w/ assist       Network Utilization Review Department  ATTENTION: Please call with any questions or concerns to [phone] and carefully listen to the prompts so that you are directed to the right person. All voicemails are confidential.   For Discharge needs, contact Care Management DC Support Team at [phone] opt. [phone]  Send all requests for admission clinical reviews, approved or denied determinations and any other requests to dedicated fax number below belonging to the campus where the patient is receiving treatment. List of dedicated fax numbers for the Facilities:  FACILITY NAME UR FAX NUMBER   ADMISSION DENIALS (Administrative/Medical Necessity) [phone]   DISCHARGE SUPPORT TEAM (NETWORK) [phone]   PARENT CHILD HEALTH (Maternity/NICU/Pediatrics) [phone]   [facility] [phone]   [facility] [phone]   [facility] [phone]   [facility]  [facility] [phone]   [facility] [phone]   [facility] [phone]   [facility] [phone]   [facility] [phone]   [facility] [phone]   [facility] [phone]   [facility] [phone]   [facility] [phone]

## 2024-02-13 NOTE — ASSESSMENT & PLAN NOTE
Reports difficulty walking due to right lower extremity cellulitis/pain  PT/OT eval  Patient from assisted living facility  Can likely be discharged back to facility tomorrow if no need for rehab

## 2024-02-13 NOTE — PLAN OF CARE
Problem: PAIN - ADULT  Goal: Verbalizes/displays adequate comfort level or baseline comfort level  Description: Interventions:  - Encourage patient to monitor pain and request assistance  - Assess pain using appropriate pain scale  - Administer analgesics based on type and severity of pain and evaluate response  - Implement non-pharmacological measures as appropriate and evaluate response  - Consider cultural and social influences on pain and pain management  - Notify physician/advanced practitioner if interventions unsuccessful or patient reports new pain  Outcome: Progressing     Problem: INFECTION - ADULT  Goal: Absence or prevention of progression during hospitalization  Description: INTERVENTIONS:  - Assess and monitor for signs and symptoms of infection  - Monitor lab/diagnostic results  - Monitor all insertion sites, i.e. indwelling lines, tubes, and drains  - Monitor endotracheal if appropriate and nasal secretions for changes in amount and color  - [place] appropriate cooling/warming therapies per order  - Administer medications as ordered  - Instruct and encourage patient and family to use good hand hygiene technique  - Identify and instruct in appropriate isolation precautions for identified infection/condition  Outcome: Progressing  Goal: Absence of fever/infection during neutropenic period  Description: INTERVENTIONS:  - Monitor WBC    Outcome: Progressing     Problem: SAFETY ADULT  Goal: Patient will remain free of falls  Description: INTERVENTIONS:  - Educate patient/family on patient safety including physical limitations  - Instruct patient to call for assistance with activity   - Consult OT/PT to assist with strengthening/mobility   - Keep Call bell within reach  - Keep bed low and locked with side rails adjusted as appropriate  - Keep care items and personal belongings within reach  - Initiate and maintain comfort rounds  - Make Fall Risk Sign visible to staff  - Apply yellow socks and bracelet  for high fall risk patients  - Consider moving patient to room near nurses station  Outcome: Progressing  Goal: Maintain or return to baseline ADL function  Description: INTERVENTIONS:  -  Assess patient's ability to carry out ADLs; assess patient's baseline for ADL function and identify physical deficits which impact ability to perform ADLs (bathing, care of mouth/teeth, toileting, grooming, dressing, etc.)  - Assess/evaluate cause of self-care deficits   - Assess range of motion  - Assess patient's mobility; develop plan if impaired  - Assess patient's need for assistive devices and provide as appropriate  - Encourage maximum independence but intervene and supervise when necessary  - Involve family in performance of ADLs  - Assess for home care needs following discharge   - Consider OT consult to assist with ADL evaluation and planning for discharge  - Provide patient education as appropriate  Outcome: Progressing  Goal: Maintains/Returns to pre admission functional level  Description: INTERVENTIONS:  - Perform AM-PAC 6 Click Basic Mobility/ Daily Activity assessment daily.  - Set and communicate daily mobility goal to care team and patient/family/caregiver.   - Collaborate with rehabilitation services on mobility goals if consulted  - Out of bed for toileting  - Record patient progress and toleration of activity level   Outcome: Progressing     Problem: DISCHARGE PLANNING  Goal: Discharge to home or other facility with appropriate resources  Description: INTERVENTIONS:  - Identify barriers to discharge w/patient and caregiver  - Arrange for needed discharge resources and transportation as appropriate  - Identify discharge learning needs (meds, wound care, etc.)  - Arrange for interpretive services to assist at discharge as needed  - Refer to Case Management Department for coordinating discharge planning if the patient needs post-hospital services based on physician/advanced practitioner order or complex needs  related to functional status, cognitive ability, or social support system  Outcome: Progressing     Problem: Knowledge Deficit  Goal: Patient/family/caregiver demonstrates understanding of disease process, treatment plan, medications, and discharge instructions  Description: Complete learning assessment and assess knowledge base.  Interventions:  - Provide teaching at level of understanding  - Provide teaching via preferred learning methods  Outcome: Progressing     Problem: Nutrition/Hydration-ADULT  Goal: Nutrient/Hydration intake appropriate for improving, restoring or maintaining nutritional needs  Description: Monitor and assess patient's nutrition/hydration status for malnutrition. Collaborate with interdisciplinary team and initiate plan and interventions as ordered.  Monitor patient's weight and dietary intake as ordered or per policy. Utilize nutrition screening tool and intervene as necessary. Determine patient's food preferences and provide high-protein, high-caloric foods as appropriate.     INTERVENTIONS:  - Monitor oral intake, urinary output, labs, and treatment plans  - Assess nutrition and hydration status and recommend course of action  - Evaluate amount of meals eaten  - Assist patient with eating if necessary   - Allow adequate time for meals  - Recommend/ encourage appropriate diets, oral nutritional supplements, and vitamin/mineral supplements  - Order, calculate, and assess calorie counts as needed  - Recommend, monitor, and adjust tube feedings and TPN/PPN based on assessed needs  - Assess need for intravenous fluids  - Provide specific nutrition/hydration education as appropriate  - Include patient/family/caregiver in decisions related to nutrition  Outcome: Progressing

## 2024-02-13 NOTE — PLAN OF CARE
Problem: PAIN - ADULT  Goal: Verbalizes/displays adequate comfort level or baseline comfort level  Description: Interventions:  - Encourage patient to monitor pain and request assistance  - Assess pain using appropriate pain scale  - Administer analgesics based on type and severity of pain and evaluate response  - Implement non-pharmacological measures as appropriate and evaluate response  - Consider cultural and social influences on pain and pain management  - Notify physician/advanced practitioner if interventions unsuccessful or patient reports new pain  Outcome: Progressing     Problem: INFECTION - ADULT  Goal: Absence or prevention of progression during hospitalization  Description: INTERVENTIONS:  - Assess and monitor for signs and symptoms of infection  - Monitor lab/diagnostic results  - Monitor all insertion sites, i.e. indwelling lines, tubes, and drains  - Monitor endotracheal if appropriate and nasal secretions for changes in amount and color  - [place] appropriate cooling/warming therapies per order  - Administer medications as ordered  - Instruct and encourage patient and family to use good hand hygiene technique  - Identify and instruct in appropriate isolation precautions for identified infection/condition  Outcome: Progressing  Goal: Absence of fever/infection during neutropenic period  Description: INTERVENTIONS:  - Monitor WBC    Outcome: Progressing     Problem: SAFETY ADULT  Goal: Patient will remain free of falls  Description: INTERVENTIONS:  - Educate patient/family on patient safety including physical limitations  - Instruct patient to call for assistance with activity   - Consult OT/PT to assist with strengthening/mobility   - Keep Call bell within reach  - Keep bed low and locked with side rails adjusted as appropriate  - Keep care items and personal belongings within reach  - Initiate and maintain comfort rounds  - Make Fall Risk Sign visible to staff  - Offer Toileting every  Hours,  in advance of need  - Initiate/Maintain alarm  - Obtain necessary fall risk management equipment:   - Apply yellow socks and bracelet for high fall risk patients  - Consider moving patient to room near nurses station  Outcome: Progressing  Goal: Maintain or return to baseline ADL function  Description: INTERVENTIONS:  -  Assess patient's ability to carry out ADLs; assess patient's baseline for ADL function and identify physical deficits which impact ability to perform ADLs (bathing, care of mouth/teeth, toileting, grooming, dressing, etc.)  - Assess/evaluate cause of self-care deficits   - Assess range of motion  - Assess patient's mobility; develop plan if impaired  - Assess patient's need for assistive devices and provide as appropriate  - Encourage maximum independence but intervene and supervise when necessary  - Involve family in performance of ADLs  - Assess for home care needs following discharge   - Consider OT consult to assist with ADL evaluation and planning for discharge  - Provide patient education as appropriate  Outcome: Progressing  Goal: Maintains/Returns to pre admission functional level  Description: INTERVENTIONS:  - Perform AM-PAC 6 Click Basic Mobility/ Daily Activity assessment daily.  - Set and communicate daily mobility goal to care team and patient/family/caregiver.   - Collaborate with rehabilitation services on mobility goals if consulted  - Perform Range of Motion  times a day.  - Reposition patient every  hours.  - Dangle patient  times a day  - Stand patient times a day  - Ambulate patient  times a day  - Out of bed to chair  times a day   - Out of bed for meals times a day  - Out of bed for toileting  - Record patient progress and toleration of activity level   Outcome: Progressing     Problem: DISCHARGE PLANNING  Goal: Discharge to home or other facility with appropriate resources  Description: INTERVENTIONS:  - Identify barriers to discharge w/patient and caregiver  - Arrange for  needed discharge resources and transportation as appropriate  - Identify discharge learning needs (meds, wound care, etc.)  - Arrange for interpretive services to assist at discharge as needed  - Refer to Case Management Department for coordinating discharge planning if the patient needs post-hospital services based on physician/advanced practitioner order or complex needs related to functional status, cognitive ability, or social support system  Outcome: Progressing     Problem: Knowledge Deficit  Goal: Patient/family/caregiver demonstrates understanding of disease process, treatment plan, medications, and discharge instructions  Description: Complete learning assessment and assess knowledge base.  Interventions:  - Provide teaching at level of understanding  - Provide teaching via preferred learning methods  Outcome: Progressing     Problem: Nutrition/Hydration-ADULT  Goal: Nutrient/Hydration intake appropriate for improving, restoring or maintaining nutritional needs  Description: Monitor and assess patient's nutrition/hydration status for malnutrition. Collaborate with interdisciplinary team and initiate plan and interventions as ordered.  Monitor patient's weight and dietary intake as ordered or per policy. Utilize nutrition screening tool and intervene as necessary. Determine patient's food preferences and provide high-protein, high-caloric foods as appropriate.     INTERVENTIONS:  - Monitor oral intake, urinary output, labs, and treatment plans  - Assess nutrition and hydration status and recommend course of action  - Evaluate amount of meals eaten  - Assist patient with eating if necessary   - Allow adequate time for meals  - Recommend/ encourage appropriate diets, oral nutritional supplements, and vitamin/mineral supplements  - Order, calculate, and assess calorie counts as needed  - Recommend, monitor, and adjust tube feedings and TPN/PPN based on assessed needs  - Assess need for intravenous fluids  -  Provide specific nutrition/hydration education as appropriate  - Include patient/family/caregiver in decisions related to nutrition  Outcome: Progressing

## 2024-02-14 VITALS
HEIGHT: 64 IN | HEART RATE: 88 BPM | BODY MASS INDEX: 30.26 KG/M2 | TEMPERATURE: 97.3 F | RESPIRATION RATE: 13 BRPM | WEIGHT: 177.25 LBS | SYSTOLIC BLOOD PRESSURE: 133 MMHG | OXYGEN SATURATION: 93 % | DIASTOLIC BLOOD PRESSURE: 69 MMHG

## 2024-02-14 LAB
ANION GAP SERPL CALCULATED.3IONS-SCNC: 6 MMOL/L
BASOPHILS # BLD AUTO: 0.03 THOUSANDS/ÂΜL (ref 0–0.1)
BASOPHILS NFR BLD AUTO: 1 % (ref 0–1)
BUN SERPL-MCNC: 27 MG/DL (ref 5–25)
CALCIUM SERPL-MCNC: 8.5 MG/DL (ref 8.4–10.2)
CHLORIDE SERPL-SCNC: 101 MMOL/L (ref 96–108)
CO2 SERPL-SCNC: 30 MMOL/L (ref 21–32)
CREAT SERPL-MCNC: 0.73 MG/DL (ref 0.6–1.3)
EOSINOPHIL # BLD AUTO: 0.21 THOUSAND/ÂΜL (ref 0–0.61)
EOSINOPHIL NFR BLD AUTO: 4 % (ref 0–6)
ERYTHROCYTE [DISTWIDTH] IN BLOOD BY AUTOMATED COUNT: 13.5 % (ref 11.6–15.1)
GFR SERPL CREATININE-BSD FRML MDRD: 71 ML/MIN/1.73SQ M
GLUCOSE SERPL-MCNC: 103 MG/DL (ref 65–140)
HCT VFR BLD AUTO: 34.7 % (ref 34.8–46.1)
HGB BLD-MCNC: 11.2 G/DL (ref 11.5–15.4)
IMM GRANULOCYTES # BLD AUTO: 0.01 THOUSAND/UL (ref 0–0.2)
IMM GRANULOCYTES NFR BLD AUTO: 0 % (ref 0–2)
LYMPHOCYTES # BLD AUTO: 1.27 THOUSANDS/ÂΜL (ref 0.6–4.47)
LYMPHOCYTES NFR BLD AUTO: 27 % (ref 14–44)
MCH RBC QN AUTO: 32.1 PG (ref 26.8–34.3)
MCHC RBC AUTO-ENTMCNC: 32.3 G/DL (ref 31.4–37.4)
MCV RBC AUTO: 99 FL (ref 82–98)
MONOCYTES # BLD AUTO: 0.7 THOUSAND/ÂΜL (ref 0.17–1.22)
MONOCYTES NFR BLD AUTO: 15 % (ref 4–12)
NEUTROPHILS # BLD AUTO: 2.53 THOUSANDS/ÂΜL (ref 1.85–7.62)
NEUTS SEG NFR BLD AUTO: 53 % (ref 43–75)
NRBC BLD AUTO-RTO: 0 /100 WBCS
PLATELET # BLD AUTO: 188 THOUSANDS/UL (ref 149–390)
PMV BLD AUTO: 8.6 FL (ref 8.9–12.7)
POTASSIUM SERPL-SCNC: 4 MMOL/L (ref 3.5–5.3)
RBC # BLD AUTO: 3.49 MILLION/UL (ref 3.81–5.12)
SODIUM SERPL-SCNC: 137 MMOL/L (ref 135–147)
WBC # BLD AUTO: 4.75 THOUSAND/UL (ref 4.31–10.16)

## 2024-02-14 PROCEDURE — 99239 HOSP IP/OBS DSCHRG MGMT >30: CPT | Performed by: INTERNAL MEDICINE

## 2024-02-14 PROCEDURE — 85025 COMPLETE CBC W/AUTO DIFF WBC: CPT | Performed by: INTERNAL MEDICINE

## 2024-02-14 PROCEDURE — 80048 BASIC METABOLIC PNL TOTAL CA: CPT | Performed by: INTERNAL MEDICINE

## 2024-02-14 RX ORDER — CEPHALEXIN 500 MG/1
500 CAPSULE ORAL EVERY 6 HOURS SCHEDULED
Qty: 20 CAPSULE | Refills: 0 | Status: SHIPPED | OUTPATIENT
Start: 2024-02-14 | End: 2024-02-19

## 2024-02-14 RX ORDER — PROPRANOLOL HYDROCHLORIDE 10 MG/1
10 TABLET ORAL DAILY
Qty: 30 TABLET | Refills: 0 | Status: SHIPPED | OUTPATIENT
Start: 2024-02-15

## 2024-02-14 RX ADMIN — SERTRALINE HYDROCHLORIDE 25 MG: 25 TABLET ORAL at 09:09

## 2024-02-14 RX ADMIN — CEFAZOLIN SODIUM 2000 MG: 2 SOLUTION INTRAVENOUS at 00:52

## 2024-02-14 RX ADMIN — FUROSEMIDE 20 MG: 40 TABLET ORAL at 09:09

## 2024-02-14 RX ADMIN — DEXTRAN 70, GLYCERIN, HYPROMELLOSE 1 DROP: 1; 2; 3 SOLUTION/ DROPS OPHTHALMIC at 09:09

## 2024-02-14 RX ADMIN — LISINOPRIL 20 MG: 20 TABLET ORAL at 09:08

## 2024-02-14 RX ADMIN — CEFAZOLIN SODIUM 2000 MG: 2 SOLUTION INTRAVENOUS at 08:01

## 2024-02-14 RX ADMIN — PANTOPRAZOLE SODIUM 40 MG: 40 TABLET, DELAYED RELEASE ORAL at 05:10

## 2024-02-14 RX ADMIN — ENOXAPARIN SODIUM 40 MG: 40 INJECTION SUBCUTANEOUS at 09:08

## 2024-02-14 RX ADMIN — ACETAMINOPHEN 975 MG: 325 TABLET, FILM COATED ORAL at 12:10

## 2024-02-14 RX ADMIN — PROPRANOLOL HYDROCHLORIDE 10 MG: 20 TABLET ORAL at 09:08

## 2024-02-14 RX ADMIN — GABAPENTIN 300 MG: 300 CAPSULE ORAL at 09:08

## 2024-02-14 RX ADMIN — AMLODIPINE BESYLATE 5 MG: 5 TABLET ORAL at 09:08

## 2024-02-14 RX ADMIN — LEVOTHYROXINE SODIUM 25 MCG: 25 TABLET ORAL at 05:10

## 2024-02-14 RX ADMIN — ACETAMINOPHEN 975 MG: 325 TABLET, FILM COATED ORAL at 03:34

## 2024-02-14 NOTE — UTILIZATION REVIEW
Date: 2/14/24      Day 3: Has surpassed a 2nd midnight with active treatments and services, which include cont iv abx. See initial review completed by CANDIDO Granger on 2/13/24.     Vital Signs:   Date/Time Temp Pulse Resp BP MAP (mmHg) SpO2 Calculated FIO2 (%) - Nasal Cannula Nasal Cannula O2 Flow Rate (L/min) O2 Device Patient Position - Orthostatic VS   02/14/24 06:56:40 97.3 °F (36.3 °C) Abnormal  88 13 133/69 90 93 % -- -- -- --   02/14/24 0100 -- -- -- -- -- 95 % 28 2 L/min Nasal cannula --   02/14/24 0001 -- -- -- -- -- 94 % 28 2 L/min Nasal cannula

## 2024-02-14 NOTE — ASSESSMENT & PLAN NOTE
Reports a fall Wednesday of last week and subsequent pain Thursday morning into the weekend.  She denies any fevers or chills.  On exam she was noted to have cellulitic change  Continue Ancef  Has since significantly improved  WBC count within normal limits  Transition to oral antibiotics with Keflex to complete therapy  PT/OT cleared for return to acute assisted [facility] facility

## 2024-02-14 NOTE — PLAN OF CARE
Problem: PAIN - ADULT  Goal: Verbalizes/displays adequate comfort level or baseline comfort level  Description: Interventions:  - Encourage patient to monitor pain and request assistance  - Assess pain using appropriate pain scale  - Administer analgesics based on type and severity of pain and evaluate response  - Implement non-pharmacological measures as appropriate and evaluate response  - Consider cultural and social influences on pain and pain management  - Notify physician/advanced practitioner if interventions unsuccessful or patient reports new pain  Outcome: Progressing     Problem: INFECTION - ADULT  Goal: Absence or prevention of progression during hospitalization  Description: INTERVENTIONS:  - Assess and monitor for signs and symptoms of infection  - Monitor lab/diagnostic results  - Monitor all insertion sites, i.e. indwelling lines, tubes, and drains  - Monitor endotracheal if appropriate and nasal secretions for changes in amount and color  - [place] appropriate cooling/warming therapies per order  - Administer medications as ordered  - Instruct and encourage patient and family to use good hand hygiene technique  - Identify and instruct in appropriate isolation precautions for identified infection/condition  Outcome: Progressing  Goal: Absence of fever/infection during neutropenic period  Description: INTERVENTIONS:  - Monitor WBC    Outcome: Progressing     Problem: SAFETY ADULT  Goal: Patient will remain free of falls  Description: INTERVENTIONS:  - Educate patient/family on patient safety including physical limitations  - Instruct patient to call for assistance with activity   - Consult OT/PT to assist with strengthening/mobility   - Keep Call bell within reach  - Keep bed low and locked with side rails adjusted as appropriate  - Keep care items and personal belongings within reach  - Initiate and maintain comfort rounds  - Make Fall Risk Sign visible to staff  - Offer Toileting every Hours, in  advance of need  - Initiate/Maintain alarm  - Obtain necessary fall risk management equipment:   - Apply yellow socks and bracelet for high fall risk patients  - Consider moving patient to room near nurses station  Outcome: Progressing  Goal: Maintain or return to baseline ADL function  Description: INTERVENTIONS:  -  Assess patient's ability to carry out ADLs; assess patient's baseline for ADL function and identify physical deficits which impact ability to perform ADLs (bathing, care of mouth/teeth, toileting, grooming, dressing, etc.)  - Assess/evaluate cause of self-care deficits   - Assess range of motion  - Assess patient's mobility; develop plan if impaired  - Assess patient's need for assistive devices and provide as appropriate  - Encourage maximum independence but intervene and supervise when necessary  - Involve family in performance of ADLs  - Assess for home care needs following discharge   - Consider OT consult to assist with ADL evaluation and planning for discharge  - Provide patient education as appropriate  Outcome: Progressing  Goal: Maintains/Returns to pre admission functional level  Description: INTERVENTIONS:  - Perform AM-PAC 6 Click Basic Mobility/ Daily Activity assessment daily.  - Set and communicate daily mobility goal to care team and patient/family/caregiver.   - Collaborate with rehabilitation services on mobility goals if consulted  - Perform Range of Motion  times a day.  - Reposition patient every  hours.  - Dangle patient  times a day  - Stand patient  times a day  - Ambulate patient  times a day  - Out of bed to chair times a day   - Out of bed for meals  times a day  - Out of bed for toileting  - Record patient progress and toleration of activity level   Outcome: Progressing     Problem: DISCHARGE PLANNING  Goal: Discharge to home or other facility with appropriate resources  Description: INTERVENTIONS:  - Identify barriers to discharge w/patient and caregiver  - Arrange for  needed discharge resources and transportation as appropriate  - Identify discharge learning needs (meds, wound care, etc.)  - Arrange for interpretive services to assist at discharge as needed  - Refer to Case Management Department for coordinating discharge planning if the patient needs post-hospital services based on physician/advanced practitioner order or complex needs related to functional status, cognitive ability, or social support system  Outcome: Progressing     Problem: Knowledge Deficit  Goal: Patient/family/caregiver demonstrates understanding of disease process, treatment plan, medications, and discharge instructions  Description: Complete learning assessment and assess knowledge base.  Interventions:  - Provide teaching at level of understanding  - Provide teaching via preferred learning methods  Outcome: Progressing     Problem: Nutrition/Hydration-ADULT  Goal: Nutrient/Hydration intake appropriate for improving, restoring or maintaining nutritional needs  Description: Monitor and assess patient's nutrition/hydration status for malnutrition. Collaborate with interdisciplinary team and initiate plan and interventions as ordered.  Monitor patient's weight and dietary intake as ordered or per policy. Utilize nutrition screening tool and intervene as necessary. Determine patient's food preferences and provide high-protein, high-caloric foods as appropriate.     INTERVENTIONS:  - Monitor oral intake, urinary output, labs, and treatment plans  - Assess nutrition and hydration status and recommend course of action  - Evaluate amount of meals eaten  - Assist patient with eating if necessary   - Allow adequate time for meals  - Recommend/ encourage appropriate diets, oral nutritional supplements, and vitamin/mineral supplements  - Order, calculate, and assess calorie counts as needed  - Recommend, monitor, and adjust tube feedings and TPN/PPN based on assessed needs  - Assess need for intravenous fluids  -  Provide specific nutrition/hydration education as appropriate  - Include patient/family/caregiver in decisions related to nutrition  Outcome: Progressing

## 2024-02-14 NOTE — ASSESSMENT & PLAN NOTE
Reports difficulty walking due to right lower extremity cellulitis/pain  PT/OT cleared to return back to [facility]

## 2024-02-14 NOTE — CASE MANAGEMENT
Case Management Assessment & Discharge Planning Note    Patient name Cheryl Vasquez  Location W /W -01 MRN [id number]  : 1930 Date 2024       Current Admission Date: 2024  Current Admission Diagnosis:Cellulitis of right lower extremity   Patient Active Problem List    Diagnosis Date Noted    RSV (respiratory syncytial virus infection) 2022    Hypothyroidism 2022    Bilateral lower extremity edema 2022    Chronic pain of right knee 06/15/2021    Primary osteoarthritis of right knee 06/15/2021    Closed fracture of upper end of right fibula 2021    Nondisplaced fracture of right fibula 2021    Cellulitis of right lower extremity 2021    Ambulatory dysfunction 2021    HTN (hypertension) 2021    Glaucoma 2021    Polyneuropathy 2021    Anxiety 2021      LOS (days): 2  Geometric Mean LOS (GMLOS) (days): 3.2  Days to GMLOS:1.3     OBJECTIVE:    Risk of Unplanned Readmission Score: 7.46         Current admission status: Inpatient       Preferred Pharmacy:   [id number] - [place] - [address]   [place] [address]  Phone: [phone] Fax: [phone]    Primary Care Provider: Efren Dennis MD    Primary Insurance: [employer] [facility]  Secondary Insurance:     ASSESSMENT:  Active Health Care Proxies    There are no active Health Care Proxies on file.    Readmission Root Cause  30 Day Readmission: No    Patient Information  Admitted from:: Facility  Mental Status: Alert  During Assessment patient was accompanied by: Not accompanied during assessment  Assessment information provided by:: Patient  Primary Caregiver: Self  Support Systems: Friends/neighbors, Daughter, Son  County of Residence: [place]  What city do you live in?: [place]  Home entry access options. Select all that apply.: No steps to enter home  Type of Current Residence: Facility  Upon entering residence,  is there a bedroom on the main floor (no further steps)?: Yes  Upon entering residence, is there a bathroom on the main floor (no further steps)?: Yes  Living Arrangements: Other (Comment) ([facility] [place])    Activities of Daily Living Prior to Admission  Functional Status: Independent  Completes ADLs independently?: Yes  Ambulates independently?: Yes  Does patient use assisted devices?: Yes  Assisted Devices (DME) used: Walker  Does patient currently own DME?: Yes  What DME does the patient currently own?: Walker    Patient Information Continued  Income Source: Pension/intermediate  Does patient have prescription coverage?: Yes  Does patient receive dialysis treatments?: No  Does patient have a history of substance abuse?: No    Means of Transportation  Means of Transport to \[facility]\"":: Family transport    Social Determinants of Health (SDOH)      Flowsheet Row Most Recent Value   Housing Stability    In the last 12 months, was there a time when you were not able to pay the mortgage or rent on time? N   In the last 12 months, how many places have you lived? 1   In the last 12 months, was there a time when you did not have a steady place to sleep or slept in a shelter (including now)? N   Transportation Needs    In the past 12 months, has lack of transportation kept you from medical appointments or from getting medications? no   In the past 12 months, has lack of transportation kept you from meetings, work, or from getting things needed for daily living? No   Food Insecurity    Within the past 12 months, you worried that your food would run out before you got the money to buy more. Never true   Within the past 12 months, the food you bought just didn't last and you didn't have money to get more. Never true   Utilities    In the past 12 months has the electric, gas, oil, or water company threatened to shut off services in your home? No     DISCHARGE DETAILS:    Discharge planning discussed with:: patient at  bedside, granddaughter Flores over phone  Freedom of Choice: Yes  Comments - Freedom of Choice: return to [facility]  CM contacted family/caregiver?: Yes  Were Treatment Team discharge recommendations reviewed with patient/caregiver?: Yes  Did patient/caregiver verbalize understanding of patient care needs?: N/A- going to facility  Were patient/caregiver advised of the risks associated with not following Treatment Team discharge recommendations?: Yes    Contacts  Patient Contacts: Flores  Relationship to Patient:: Family  Contact Method: Phone  Phone Number: [phone]  Reason/Outcome: Continuity of Care, Emergency Contact, Discharge Planning    Requested Home Health Care         Is the patient interested in HHC at discharge?: No    DME Referral Provided  Referral made for DME?: No    Other Referral/Resources/Interventions Provided:  Interventions: Assisted Living, Facility Return  Referral Comments: Patient was admitted to [facility] due to cellulitis of R lower extremity. Patient lives at [facility]. Patient is independent at baseline with ambulation and ADLs- uses and owns a walker. Patient wanting to return to [facility]. Call made to [facility], spoke with Alisha and reviewed PT notes. Alisha stating patient appears to be at baseline and can return to facility today. CM to fax PT orders to facility at [phone]. CM met with patient at bedside to discuss above, patient aware and agreeable to dcp to facility today. Call also made to granddaughter Flores who is also agreeable to d/c. 1:00PM [facility] confirmed- all appropriate parties aware. No further CM needs anticipated at this time.      Would you like to participate in our [facility] service program?  : No - Declined    Treatment Team Recommendation: Facility Return, Assisted Living  Discharge Destination Plan:: Facility Return, Assisted Living  Transport at Discharge : Wheelchair van  Transported by (Company and Unit #): [employer]      IMM Given (Date)::  02/12/24    Accepting Facility Name, City & State : [facility]  Receiving Facility/Agency Phone Number: [phone]  Facility/Agency Fax Number: [phone]

## 2024-02-14 NOTE — DISCHARGE SUMMARY
[facility]  Discharge- Cheryl Vasquez 8/25/1930, 89 y.o. female MRN: [id number]  Unit/Bed#: W -01 Encounter: [id number]  Primary Care Provider: Efren Dennis MD   Date and time admitted to hospital: 2/12/2024 10:41 AM    * Cellulitis of right lower extremity  Assessment & Plan  Reports a fall Wednesday of last week and subsequent pain Thursday morning into the weekend.  She denies any fevers or chills.  On exam she was noted to have cellulitic change  Continue Ancef  Has since significantly improved  WBC count within normal limits  Transition to oral antibiotics with Keflex to complete therapy  PT/OT cleared for return to [facility]    Hypothyroidism  Assessment & Plan  Continue Synthroid    Polyneuropathy  Assessment & Plan  Continue Neurontin    Glaucoma  Assessment & Plan  PTA drops    HTN (hypertension)  Assessment & Plan  Continue home BP meds    Ambulatory dysfunction  Assessment & Plan  Reports difficulty walking due to right lower extremity cellulitis/pain  PT/OT cleared to return back to [facility]        Discharging Physician / Practitioner: Dominic Zaragoza MD  PCP: Efren Dennis MD  Admission Date:   Admission Orders (From admission, onward)       Ordered        02/12/24 1437  INPATIENT ADMISSION  Once                          Discharge Date: 02/14/24    Medical Problems       Resolved Problems  Date Reviewed: 2/14/2024   None         Consultations During Hospital Stay:  none    Procedures Performed:   none    Significant Findings / Test Results:   XR pelvis ap only 1 or 2 vw    Result Date: 2/12/2024  Impression: No acute osseous abnormality. Workstation performed: [id number]     XR femur 2 views RIGHT    Result Date: 2/12/2024  Impression: No acute osseous abnormality. Workstation performed: [id number]     XR knee 1 or 2 vw right    Result Date: 2/12/2024  Impression: No acute osseous abnormality. Workstation performed: [id number]     XR tibia fibula 2 views  "RIGHT    Result Date: 2/12/2024  Impression: No acute osseous abnormality. Workstation performed: [id number]     XR ankle 3+ views RIGHT    Result Date: 2/12/2024  Impression: No acute osseous abnormality. Workstation performed: [id number]      Incidental Findings:   none     Test Results Pending at Discharge (will require follow up):   none     Outpatient Tests Requested:  none    Complications:  none    Reason for Admission: Cellulitis    Hospital Course:     Cheryl Vasquez is a 89 y.o. female patient who originally presented to the hospital on 2/12/2024 with no significant past medical history initially presented with cellulitis of lower extremity.  Was treated IV antibiotics with significant improvement ultimately transition to oral antibiotics on dischargePlease see above list of diagnoses and related plan for additional information.     Condition at Discharge: stable     Discharge Day Visit / Exam:     Subjective: Denies any chest pain, shortness of breath, fevers, chills, cough  Vitals: Blood Pressure: 133/69 (02/14/24 0908)  Pulse: 88 (02/14/24 0656)  Temperature: (!) 97.3 °F (36.3 °C) (02/14/24 0656)  Temp Source: Oral (02/13/24 0745)  Respirations: 13 (02/14/24 0656)  Height: 5' 4\" (162.6 cm) (02/12/24 1621)  Weight - Scale: 80.4 kg (177 lb 4 oz) (02/12/24 1621)  SpO2: 93 % (02/14/24 0656)  Exam:   Physical Exam  Constitutional:       General: She is not in acute distress.     Appearance: She is well-developed. She is not diaphoretic.   HENT:      Head: Normocephalic and atraumatic.      Nose: Nose normal.      Mouth/Throat:      Pharynx: No oropharyngeal exudate.   Eyes:      General: No scleral icterus.        Right eye: No discharge.         Left eye: No discharge.      Conjunctiva/sclera: Conjunctivae normal.   Neck:      Thyroid: No thyromegaly.      Vascular: No JVD.   Cardiovascular:      Rate and Rhythm: Normal rate and regular rhythm.      Heart sounds: Normal heart sounds. No murmur heard.     No " friction rub. No gallop.   Pulmonary:      Effort: Pulmonary effort is normal. No respiratory distress.      Breath sounds: Normal breath sounds. No wheezing or rales.   Chest:      Chest wall: No tenderness.   Abdominal:      General: Bowel sounds are normal. There is no distension.      Palpations: Abdomen is soft.      Tenderness: There is no abdominal tenderness. There is no guarding or rebound.   Musculoskeletal:         General: No tenderness or deformity. Normal range of motion.      Cervical back: Normal range of motion and neck supple.   Skin:     General: Skin is warm and dry.      Findings: No erythema or rash.   Neurological:      Mental Status: She is alert. Mental status is at baseline.      Cranial Nerves: No cranial nerve deficit.      Sensory: No sensory deficit.      Motor: No abnormal muscle tone.      Coordination: Coordination normal.         Discussion with Family: pt    Discharge instructions/Information to patient and family:   See after visit summary for information provided to patient and family.      Provisions for Follow-Up Care:  See after visit summary for information related to follow-up care and any pertinent home health orders.      Disposition:     Home    For Discharges to [facility] SNF:   Not Applicable to this Patient - Not Applicable to this Patient    Planned Readmission: none     Discharge Statement:  I spent 60 minutes discharging the patient. This time was spent on the day of discharge. I had direct contact with the patient on the day of discharge. Greater than 50% of the total time was spent examining patient, answering all patient questions, arranging and discussing plan of care with patient as well as directly providing post-discharge instructions.  Additional time then spent on discharge activities.    Discharge Medications:  See after visit summary for reconciled discharge medications provided to patient and family.      ** Please Note: This note has been  constructed using a voice recognition system **

## 2024-02-15 ENCOUNTER — APPOINTMENT (EMERGENCY)
Dept: CT IMAGING | Facility: HOSPITAL | Age: 89
End: 2024-02-15
Payer: COMMERCIAL

## 2024-02-15 ENCOUNTER — HOSPITAL ENCOUNTER (EMERGENCY)
Facility: HOSPITAL | Age: 89
Discharge: HOME | End: 2024-02-15
Attending: EMERGENCY MEDICINE
Payer: COMMERCIAL

## 2024-02-15 VITALS
RESPIRATION RATE: 18 BRPM | DIASTOLIC BLOOD PRESSURE: 57 MMHG | TEMPERATURE: 97.7 F | OXYGEN SATURATION: 92 % | WEIGHT: 179.68 LBS | BODY MASS INDEX: 30.84 KG/M2 | SYSTOLIC BLOOD PRESSURE: 123 MMHG | HEART RATE: 73 BPM

## 2024-02-15 DIAGNOSIS — S09.90XA CLOSED HEAD INJURY, INITIAL ENCOUNTER: ICD-10-CM
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
## 2024-02-15 PROCEDURE — 72125 CT NECK SPINE W/O DYE: CPT

## 2024-02-15 PROCEDURE — 99285 EMERGENCY DEPT VISIT HI MDM: CPT | Performed by: EMERGENCY MEDICINE

## 2024-02-15 PROCEDURE — 70450 CT HEAD/BRAIN W/O DYE: CPT

## 2024-02-15 PROCEDURE — G1004 CDSM NDSC: HCPCS

## 2024-02-15 PROCEDURE — 99284 EMERGENCY DEPT VISIT MOD MDM: CPT

## 2024-02-15 PROCEDURE — 93005 ELECTROCARDIOGRAM TRACING: CPT

## 2024-02-15 RX ORDER — ACETAMINOPHEN 325 MG/1
650 TABLET ORAL ONCE
Status: DISCONTINUED | OUTPATIENT
Start: 2024-02-15 | End: 2024-02-15 | Stop reason: HOSPADM

## 2024-02-15 NOTE — ED PROVIDER NOTES
History  Chief Complaint   Patient presents with    Fall     Pt presents after a fall at her place of living, states she tripped over her walker and onto her bottom. + headstrike, -Loc, -thinners. GCS 15     (Cheryl Vasquez) Cheryl Vasquez is a 89 y.o. female     They presented to the emergency department on February 15, 2024. Patient presents with:  Fall: Pt presents after a fall at her place of living, states she tripped over her walker and onto her bottom. + headstrike, -Loc, -thinners. GCS 15.    The patient states that she was at home getting up from bed using a walker where she then collided with her recliner chair and her walker got stuck when attempting to get her walker unstuck she lost her balance fell backwards landing on her buttocks rolling backwards and hitting the back of her head.  Patient did not have any loss of consciousness, did not have any change in vision, and does not take any anti-coagulants/antiplatelets. Patient denies numbness, tingling, weakness, chest pain, difficulty breathing, abdominal pain, loss of bowel or bladder, or any other complaint at this time.              Prior to Admission Medications   Prescriptions Last Dose Informant Patient Reported? Taking?   Combigan 0.2-0.5 %  Self Yes No   Sig: INSERT 1 DROP OPHTHALMICALLY EVERY 12 HOURS   acetaminophen (TYLENOL) 325 mg tablet  Self No No   Sig: Take 3 tablets (975 mg total) by mouth every 8 (eight) hours   amLODIPine-benazepril (LOTREL 5-20) 5-20 MG per capsule  Self No No   Sig: Take 1 capsule by mouth daily   cephalexin (KEFLEX) 500 mg capsule   No No   Sig: Take 1 capsule (500 mg total) by mouth every 6 (six) hours for 5 days   cycloSPORINE (RESTASIS) 0.05 % ophthalmic emulsion  Self Yes No   Si drop 2 (two) times a day   furosemide (LASIX) 20 mg tablet  Self Yes No   Sig: Take 20 mg by mouth daily   gabapentin (NEURONTIN) 300 mg capsule  Self Yes No   Sig: Take 300 mg by mouth 3 (three) times a day   levothyroxine 25  mcg tablet  Self Yes No   Sig: Take 25 mcg by mouth daily   lidocaine (LMX) 4 % cream  Self No No   Sig: Apply topically as needed for mild pain   omeprazole (PriLOSEC) 20 mg delayed release capsule  Self Yes No   Sig: Take 20 mg by mouth daily   propranolol (INDERAL) 10 mg tablet   No No   Sig: Take 1 tablet (10 mg total) by mouth in the morning   sertraline (ZOLOFT) 25 mg tablet  Self Yes No   Sig: Take 25 mg by mouth daily      Facility-Administered Medications: None       Past Medical History:   Diagnosis Date    Abnormal gait     Anxiety     Arthritis     DJD (degenerative joint disease)     GERD (gastroesophageal reflux disease)     HTN (hypertension)     Hypertension     Hypothyroidism     Osteoporosis        History reviewed. No pertinent surgical history.    History reviewed. No pertinent family history.  I have reviewed and agree with the history as documented.    E-Cigarette/Vaping    E-Cigarette Use Never User      E-Cigarette/Vaping Substances     Social History     Tobacco Use    Smoking status: Never    Smokeless tobacco: Never   Vaping Use    Vaping status: Never Used   Substance Use Topics    Alcohol use: Not Currently    Drug use: Never        Review of Systems   Constitutional:  Negative for chills and fever.   HENT:  Negative for ear pain and sore throat.    Eyes:  Negative for pain and visual disturbance.   Respiratory:  Negative for cough and shortness of breath.    Cardiovascular:  Negative for chest pain and palpitations.   Gastrointestinal:  Negative for abdominal pain and vomiting.   Genitourinary:  Negative for dysuria and hematuria.   Musculoskeletal:  Negative for arthralgias and back pain.   Skin:  Negative for color change and rash.   Neurological:  Negative for seizures and syncope.   All other systems reviewed and are negative.      Physical Exam  ED Triage Vitals [02/15/24 1719]   Temperature Pulse Respirations Blood Pressure SpO2   97.7 °F (36.5 °C) 71 18 122/65 92 %      Temp  Source Heart Rate Source Patient Position - Orthostatic VS BP Location FiO2 (%)   Oral Monitor Sitting Right arm --      Pain Score       No Pain             Orthostatic Vital Signs  Vitals:    02/15/24 1719 02/15/24 1915   BP: 122/65 123/57   Pulse: 71 73   Patient Position - Orthostatic VS: Sitting Lying       Physical Exam  Vitals and nursing note reviewed.   Constitutional:       General: She is not in acute distress.     Appearance: Normal appearance.      Interventions: Cervical collar in place.   HENT:      Head: Normocephalic and atraumatic.      Right Ear: Tympanic membrane, ear canal and external ear normal.      Left Ear: Tympanic membrane, ear canal and external ear normal.      Nose: Nose normal.      Mouth/Throat:      Mouth: Mucous membranes are moist.   Eyes:      Conjunctiva/sclera: Conjunctivae normal.   Cardiovascular:      Rate and Rhythm: Normal rate and regular rhythm.   Pulmonary:      Effort: Pulmonary effort is normal. No respiratory distress.      Breath sounds: Normal breath sounds.   Abdominal:      General: Abdomen is flat. Bowel sounds are normal.      Tenderness: There is no abdominal tenderness. There is no guarding or rebound.   Musculoskeletal:         General: Normal range of motion.      Cervical back: Normal range of motion. Tenderness (Mild tenderness to palpation at the superior aspect of cervical spine, as well as occiput, no overlying hematoma or other medic injury identified) present.   Skin:     General: Skin is warm and dry.      Capillary Refill: Capillary refill takes less than 2 seconds.   Neurological:      Mental Status: She is alert. Mental status is at baseline.   Psychiatric:         Mood and Affect: Mood normal.         ED Medications  Medications   acetaminophen (TYLENOL) tablet 650 mg (650 mg Oral Not Given 2/15/24 1814)       Diagnostic Studies  Results Reviewed       None                   CT head without contrast   Final Result by Glenroy Ravi MD (02/15  1954)      No acute intracranial abnormality.                  Workstation performed: [id number]         CT spine cervical without contrast   Final Result by Glenroy Ravi MD (02/15 1959)      No cervical spine fracture or traumatic malalignment.                  Workstation performed: [id number]               Procedures  ECG 12 Lead Documentation Only    Date/Time: 2/15/2024 7:12 PM    Performed by: Dani Carreon MD  Authorized by: Dani Carreon MD    Indications / Diagnosis:  Fall  ECG reviewed by me, the ED Provider: yes    Patient location:  ED  Previous ECG:     Previous ECG:  Compared to current    Similarity:  No change  Interpretation:     Interpretation: normal    Rate:     ECG rate:  77    ECG rate assessment: normal    Rhythm:     Rhythm: sinus rhythm    Ectopy:     Ectopy: none    QRS:     QRS axis:  Normal    QRS intervals:  Normal  Conduction:     Conduction: normal    ST segments:     ST segments:  Normal  Comments:      Normal Sinus rhythm at 77 BPM, no PAC, no PVC, no acute ischemic changes.        ED Course                                       Medical Decision Making  89-year-old female presenting with mechanical fall with head strike, not on anti-coagulants/antiplatelets with minimal tenderness at the base of the occiput as well as superior aspect of cervical spine.  Differential diagnosis includes but is not limited to closed head injury, TBI, cervical strain, cervical fracture.  Due to patient's age, will obtain CT imaging of the head and neck.  Imaging revealed no acute intracranial or cervical abnormality.  Discussed results with patient at bedside.  States that she feels much better. Patient appears well, nontoxic, agrees with plan of care at this time.  Answered all questions.  In light of this, patient would benefit from outpatient follow-up.    Amount and/or Complexity of Data Reviewed  Radiology: ordered.    Risk  OTC drugs.          Disposition  Final diagnoses:   Fall,  initial encounter   Closed head injury, initial encounter     Time reflects when diagnosis was documented in both MDM as applicable and the Disposition within this note       Time User Action Codes Description Comment    2/15/2024  8:00 PM AshPravin EDWARD Add [W19.XXXA] Fall, initial encounter     2/15/2024  8:01 PM Dani Carreon [S09.90XA] Closed head injury, initial encounter           ED Disposition       ED Disposition   Discharge    Condition   Stable    Date/Time   Thu Feb 15, 2024  8:00 PM    Comment   Cheryl Vasquez discharge to home/self care.                   Follow-up Information       Follow up With Specialties Details Why Contact Info    Efren Dennis MD Family Medicine   [address] [place] [address]  [phone]              Patient's Medications   Discharge Prescriptions    No medications on file     No discharge procedures on file.    PDMP Review       None             ED Provider  Attending physically available and evaluated Cheryl Vasquez. I managed the patient along with the ED Attending.    Electronically Signed by           Dani Carreon MD  02/15/24 2009

## 2024-02-16 LAB
ATRIAL RATE: 74 BPM
P AXIS: 58 DEGREES
PR INTERVAL: 160 MS
QRS AXIS: 51 DEGREES
QRSD INTERVAL: 78 MS
QT INTERVAL: 364 MS
QTC INTERVAL: 404 MS
T WAVE AXIS: 66 DEGREES
VENTRICULAR RATE: 74 BPM

## 2024-02-16 PROCEDURE — 93010 ELECTROCARDIOGRAM REPORT: CPT | Performed by: INTERNAL MEDICINE

## 2024-02-16 NOTE — UTILIZATION REVIEW
NOTIFICATION OF ADMISSION DISCHARGE   This is a Notification of Discharge from [facility]. Please be advised that this patient has been discharge from our facility. Below you will find the admission and discharge date and time including the patient’s disposition.   UTILIZATION REVIEW CONTACT:  Dany Lopez  Utilization   Network Utilization Review Department  Phone: [phone] x carefully listen to the prompts. All voicemails are confidential.  Email: [email or web address]     ADMISSION INFORMATION  PRESENTATION DATE: 2/12/2024 10:41 AM  OBERVATION ADMISSION DATE:   INPATIENT ADMISSION DATE: 2/12/24  2:37 PM   DISCHARGE DATE: 2/14/2024  1:53 PM   DISPOSITION:Home/Self Care    Network Utilization Review Department  ATTENTION: Please call with any questions or concerns to [phone] and carefully listen to the prompts so that you are directed to the right person. All voicemails are confidential.   For Discharge needs, contact Care Management DC Support Team at [phone] opt. 2  Send all requests for admission clinical reviews, approved or denied determinations and any other requests to dedicated fax number below belonging to the campus where the patient is receiving treatment. List of dedicated fax numbers for the Facilities:  FACILITY NAME UR FAX NUMBER   ADMISSION DENIALS (Administrative/Medical Necessity) [phone]   DISCHARGE SUPPORT TEAM ([facility]) [phone]   PARENT CHILD HEALTH (Maternity/NICU/Pediatrics) [phone]   [facility] [phone]   [facility] [phone]   [facility] [phone]   [facility] [phone]   [facility] [phone]   [facility] [phone]   [facility] [phone]   [facility] [phone]   [facility]  [facility] [phone]   [facility] [phone]   [facility] [phone]   [facility] [phone]

## 2024-02-19 NOTE — ED ATTENDING ATTESTATION
2/15/2024  IPravin MD, saw and evaluated the patient. I have discussed the patient with the resident/non-physician practitioner and agree with the resident's/non-physician practitioner's findings, Plan of Care, and MDM as documented in the resident's/non-physician practitioner's note, except where noted. All available labs and Radiology studies were reviewed.  I was present for key portions of any procedure(s) performed by the resident/non-physician practitioner and I was immediately available to provide assistance.       At this point I agree with the current assessment done in the Emergency Department.  I have conducted an independent evaluation of this patient a history and physical is as follows:see above for h and p- agree with er resident tx plan / dispo     ED Course  ED Course as of 02/19/24 1336   Thu Feb 15, 2024   2048 Er md note- pt seen and thoroughly evaluated by er md- case d/w er resident-  89 yr female states accidentally tripped over walker tonight with posterior head injury only --  was not on ground for long- pt remembers entire event- no medical symptoms caused fall- no recent illness- no other injury as per pt- avss-- pulse ox 92 % on ra- interpretation is low- normal- no intervention - occipital scalp contusion- no lac- no depression- no pmt c/t/l/s spine- rrr s1/s2/-cta-b/-soft nt/nd- normal non focal neuro exam- moving all bue/ble pain free- normally for her with no signs of injury          Critical Care Time  Procedures

## 2024-04-18 ENCOUNTER — TELEPHONE (OUTPATIENT)
Dept: LAB | Facility: HOSPITAL | Age: 89
End: 2024-04-18

## 2024-04-19 ENCOUNTER — TELEPHONE (OUTPATIENT)
Dept: LAB | Facility: HOSPITAL | Age: 89
End: 2024-04-19

## 2024-04-30 ENCOUNTER — APPOINTMENT (OUTPATIENT)
Dept: LAB | Facility: HOSPITAL | Age: 89
End: 2024-04-30
Payer: COMMERCIAL

## 2024-04-30 DIAGNOSIS — I51.9 MYXEDEMA HEART DISEASE: Primary | ICD-10-CM

## 2024-04-30 DIAGNOSIS — F32.A DEPRESSIVE TYPE PSYCHOSIS: ICD-10-CM

## 2024-04-30 DIAGNOSIS — E03.9 MYXEDEMA HEART DISEASE: Primary | ICD-10-CM

## 2024-04-30 DIAGNOSIS — I10 ESSENTIAL HYPERTENSION, MALIGNANT: ICD-10-CM

## 2024-04-30 LAB
BASOPHILS # BLD AUTO: 0.03 THOUSANDS/ÂΜL (ref 0–0.1)
BASOPHILS NFR BLD AUTO: 1 % (ref 0–1)
EOSINOPHIL # BLD AUTO: 0.21 THOUSAND/ÂΜL (ref 0–0.61)
EOSINOPHIL NFR BLD AUTO: 5 % (ref 0–6)
ERYTHROCYTE [DISTWIDTH] IN BLOOD BY AUTOMATED COUNT: 12.5 % (ref 11.6–15.1)
HCT VFR BLD AUTO: 36 % (ref 34.8–46.1)
HGB BLD-MCNC: 11.8 G/DL (ref 11.5–15.4)
IMM GRANULOCYTES # BLD AUTO: 0.01 THOUSAND/UL (ref 0–0.2)
IMM GRANULOCYTES NFR BLD AUTO: 0 % (ref 0–2)
LYMPHOCYTES # BLD AUTO: 1.02 THOUSANDS/ÂΜL (ref 0.6–4.47)
LYMPHOCYTES NFR BLD AUTO: 23 % (ref 14–44)
MCH RBC QN AUTO: 32.8 PG (ref 26.8–34.3)
MCHC RBC AUTO-ENTMCNC: 32.8 G/DL (ref 31.4–37.4)
MCV RBC AUTO: 100 FL (ref 82–98)
MONOCYTES # BLD AUTO: 0.55 THOUSAND/ÂΜL (ref 0.17–1.22)
MONOCYTES NFR BLD AUTO: 13 % (ref 4–12)
NEUTROPHILS # BLD AUTO: 2.53 THOUSANDS/ÂΜL (ref 1.85–7.62)
NEUTS SEG NFR BLD AUTO: 58 % (ref 43–75)
NRBC BLD AUTO-RTO: 0 /100 WBCS
PLATELET # BLD AUTO: 207 THOUSANDS/UL (ref 149–390)
PMV BLD AUTO: 9.3 FL (ref 8.9–12.7)
RBC # BLD AUTO: 3.6 MILLION/UL (ref 3.81–5.12)
TSH SERPL DL<=0.05 MIU/L-ACNC: 2.52 UIU/ML (ref 0.45–4.5)
WBC # BLD AUTO: 4.35 THOUSAND/UL (ref 4.31–10.16)

## 2024-04-30 PROCEDURE — 84443 ASSAY THYROID STIM HORMONE: CPT

## 2024-04-30 PROCEDURE — 36415 COLL VENOUS BLD VENIPUNCTURE: CPT

## 2024-04-30 PROCEDURE — 80053 COMPREHEN METABOLIC PANEL: CPT

## 2024-04-30 PROCEDURE — 85025 COMPLETE CBC W/AUTO DIFF WBC: CPT

## 2024-05-08 LAB
ALBUMIN SERPL BCP-MCNC: 3.8 G/DL (ref 3.5–5)
ALP SERPL-CCNC: 92 U/L (ref 34–104)
ALT SERPL W P-5'-P-CCNC: 9 U/L (ref 7–52)
ANION GAP SERPL CALCULATED.3IONS-SCNC: 6 MMOL/L (ref 4–13)
AST SERPL W P-5'-P-CCNC: 14 U/L (ref 13–39)
BILIRUB SERPL-MCNC: 0.32 MG/DL (ref 0.2–1)
BUN SERPL-MCNC: 16 MG/DL (ref 5–25)
CALCIUM SERPL-MCNC: 8.6 MG/DL (ref 8.4–10.2)
CHLORIDE SERPL-SCNC: 99 MMOL/L (ref 96–108)
CO2 SERPL-SCNC: 30 MMOL/L (ref 21–32)
CREAT SERPL-MCNC: 0.57 MG/DL (ref 0.6–1.3)
GFR SERPL CREATININE-BSD FRML MDRD: 80 ML/MIN/1.73SQ M
GLUCOSE P FAST SERPL-MCNC: 95 MG/DL (ref 65–99)
POTASSIUM SERPL-SCNC: 4.7 MMOL/L (ref 3.5–5.3)
PROT SERPL-MCNC: 6.5 G/DL (ref 6.4–8.4)
SODIUM SERPL-SCNC: 135 MMOL/L (ref 135–147)